# Patient Record
Sex: MALE | Race: WHITE | NOT HISPANIC OR LATINO | Employment: OTHER | URBAN - METROPOLITAN AREA
[De-identification: names, ages, dates, MRNs, and addresses within clinical notes are randomized per-mention and may not be internally consistent; named-entity substitution may affect disease eponyms.]

---

## 2019-04-03 ENCOUNTER — HOSPITAL ENCOUNTER (OUTPATIENT)
Dept: LAB | Facility: HOSPITAL | Age: 74
Discharge: HOME | End: 2019-04-03
Attending: PHYSICIAN ASSISTANT

## 2019-04-04 ENCOUNTER — HOSPITAL ENCOUNTER (OUTPATIENT)
Dept: LAB | Facility: HOSPITAL | Age: 74
Discharge: HOME | End: 2019-04-04
Attending: PHYSICIAN ASSISTANT

## 2019-04-04 LAB
25(OH)D3 SERPL-MCNC: 44.1 NG/ML (ref 30–100)
ALBUMIN SERPL-MCNC: 4.2 G/DL (ref 3.5–5)
ALBUMIN/GLOB SERPL: 1.4 {RATIO} (ref 1.4–2.6)
ALP SERPL-CCNC: 55 U/L (ref 56–155)
ALT SERPL-CCNC: 16 U/L (ref 10–40)
ANION GAP SERPL CALC-SCNC: 14 MMOL/L (ref 8–19)
AST SERPL-CCNC: 18 U/L (ref 15–50)
BASOPHILS # BLD AUTO: 0.03 10*3/UL (ref 0–0.2)
BASOPHILS NFR BLD AUTO: 0.6 % (ref 0–3)
BILIRUB SERPL-MCNC: 0.52 MG/DL (ref 0.2–1.3)
BUN SERPL-MCNC: 15 MG/DL (ref 5–25)
BUN/CREAT SERPL: 11 {RATIO} (ref 6–20)
CALCIUM SERPL-MCNC: 8.8 MG/DL (ref 8.7–10.4)
CHLORIDE SERPL-SCNC: 104 MMOL/L (ref 99–111)
CHOLEST SERPL-MCNC: 129 MG/DL (ref 107–200)
CHOLEST/HDLC SERPL: 2.2 {RATIO} (ref 3–6)
CONV ABS IMM GRAN: 0.02 10*3/UL (ref 0–0.2)
CONV CO2: 27 MMOL/L (ref 22–32)
CONV IMMATURE GRAN: 0.4 % (ref 0–1.8)
CONV TOTAL PROTEIN: 7.3 G/DL (ref 6.3–8.2)
CREAT UR-MCNC: 1.37 MG/DL (ref 0.7–1.2)
DEPRECATED RDW RBC AUTO: 43.3 FL (ref 35.1–43.9)
EOSINOPHIL # BLD AUTO: 0.36 10*3/UL (ref 0–0.7)
EOSINOPHIL # BLD AUTO: 7.5 % (ref 0–7)
ERYTHROCYTE [DISTWIDTH] IN BLOOD BY AUTOMATED COUNT: 14.1 % (ref 11.6–14.4)
FOLATE SERPL-MCNC: 11.6 NG/ML (ref 4.8–20)
GFR SERPLBLD BASED ON 1.73 SQ M-ARVRAT: 51 ML/MIN/{1.73_M2}
GLOBULIN UR ELPH-MCNC: 3.1 G/DL (ref 2–3.5)
GLUCOSE SERPL-MCNC: 100 MG/DL (ref 70–99)
HBA1C MFR BLD: 12.8 G/DL (ref 14–18)
HCT VFR BLD AUTO: 40.5 % (ref 42–52)
HDLC SERPL-MCNC: 59 MG/DL (ref 40–60)
IRON SATN MFR SERPL: 19 % (ref 20–55)
IRON SERPL-MCNC: 76 UG/DL (ref 70–180)
LDLC SERPL CALC-MCNC: 55 MG/DL (ref 70–100)
LYMPHOCYTES # BLD AUTO: 1.03 10*3/UL (ref 1–5)
MCH RBC QN AUTO: 26.7 PG (ref 27–31)
MCHC RBC AUTO-ENTMCNC: 31.6 G/DL (ref 33–37)
MCV RBC AUTO: 84.4 FL (ref 80–96)
MONOCYTES # BLD AUTO: 0.47 10*3/UL (ref 0.2–1.2)
MONOCYTES NFR BLD AUTO: 9.8 % (ref 3–10)
NEUTROPHILS # BLD AUTO: 2.87 10*3/UL (ref 2–8)
NEUTROPHILS NFR BLD AUTO: 60.2 % (ref 30–85)
NRBC CBCN: 0 % (ref 0–0.7)
OSMOLALITY SERPL CALC.SUM OF ELEC: 293 MOSM/KG (ref 273–304)
PLATELET # BLD AUTO: 253 10*3/UL (ref 130–400)
PMV BLD AUTO: 11 FL (ref 9.4–12.4)
POTASSIUM SERPL-SCNC: 4.2 MMOL/L (ref 3.5–5.3)
RBC # BLD AUTO: 4.8 10*6/UL (ref 4.7–6.1)
SODIUM SERPL-SCNC: 141 MMOL/L (ref 135–147)
T4 FREE SERPL-MCNC: 1.1 NG/DL (ref 0.9–1.8)
TIBC SERPL-MCNC: 398 UG/DL (ref 245–450)
TRANSFERRIN SERPL-MCNC: 278 MG/DL (ref 215–365)
TRIGL SERPL-MCNC: 73 MG/DL (ref 40–150)
TSH SERPL-ACNC: 4.71 M[IU]/L (ref 0.27–4.2)
VARIANT LYMPHS NFR BLD MANUAL: 21.5 % (ref 20–45)
VIT B12 SERPL-MCNC: 255 PG/ML (ref 211–911)
VLDLC SERPL-MCNC: 15 MG/DL (ref 5–37)
WBC # BLD AUTO: 4.78 10*3/UL (ref 4.8–10.8)

## 2019-04-22 ENCOUNTER — OFFICE VISIT CONVERTED (OUTPATIENT)
Dept: SURGERY | Facility: CLINIC | Age: 74
End: 2019-04-22
Attending: NURSE PRACTITIONER

## 2019-05-06 ENCOUNTER — HOSPITAL ENCOUNTER (OUTPATIENT)
Dept: GASTROENTEROLOGY | Facility: HOSPITAL | Age: 74
Setting detail: HOSPITAL OUTPATIENT SURGERY
Discharge: HOME | End: 2019-05-06
Attending: SURGERY

## 2019-10-04 ENCOUNTER — HOSPITAL ENCOUNTER (OUTPATIENT)
Dept: LAB | Facility: HOSPITAL | Age: 74
Discharge: HOME | End: 2019-10-04
Attending: PHYSICIAN ASSISTANT

## 2019-10-04 LAB
25(OH)D3 SERPL-MCNC: 42.2 NG/ML (ref 30–100)
ALBUMIN SERPL-MCNC: 4.4 G/DL (ref 3.5–5)
ALBUMIN/GLOB SERPL: 1.4 {RATIO} (ref 1.4–2.6)
ALP SERPL-CCNC: 57 U/L (ref 56–155)
ALT SERPL-CCNC: 13 U/L (ref 10–40)
ANION GAP SERPL CALC-SCNC: 18 MMOL/L (ref 8–19)
AST SERPL-CCNC: 16 U/L (ref 15–50)
BASOPHILS # BLD AUTO: 0.02 10*3/UL (ref 0–0.2)
BASOPHILS NFR BLD AUTO: 0.4 % (ref 0–3)
BILIRUB SERPL-MCNC: 0.41 MG/DL (ref 0.2–1.3)
BUN SERPL-MCNC: 17 MG/DL (ref 5–25)
BUN/CREAT SERPL: 12 {RATIO} (ref 6–20)
CALCIUM SERPL-MCNC: 9.4 MG/DL (ref 8.7–10.4)
CHLORIDE SERPL-SCNC: 104 MMOL/L (ref 99–111)
CHOLEST SERPL-MCNC: 142 MG/DL (ref 107–200)
CHOLEST/HDLC SERPL: 2.3 {RATIO} (ref 3–6)
CONV ABS IMM GRAN: 0.02 10*3/UL (ref 0–0.2)
CONV CO2: 24 MMOL/L (ref 22–32)
CONV IMMATURE GRAN: 0.4 % (ref 0–1.8)
CONV TOTAL PROTEIN: 7.6 G/DL (ref 6.3–8.2)
CREAT UR-MCNC: 1.39 MG/DL (ref 0.7–1.2)
DEPRECATED RDW RBC AUTO: 42.3 FL (ref 35.1–43.9)
EOSINOPHIL # BLD AUTO: 0.31 10*3/UL (ref 0–0.7)
EOSINOPHIL # BLD AUTO: 6 % (ref 0–7)
ERYTHROCYTE [DISTWIDTH] IN BLOOD BY AUTOMATED COUNT: 14.1 % (ref 11.6–14.4)
FOLATE SERPL-MCNC: 18.2 NG/ML (ref 4.8–20)
GFR SERPLBLD BASED ON 1.73 SQ M-ARVRAT: 50 ML/MIN/{1.73_M2}
GLOBULIN UR ELPH-MCNC: 3.2 G/DL (ref 2–3.5)
GLUCOSE SERPL-MCNC: 100 MG/DL (ref 70–99)
HCT VFR BLD AUTO: 38.4 % (ref 42–52)
HDLC SERPL-MCNC: 63 MG/DL (ref 40–60)
HGB BLD-MCNC: 12.1 G/DL (ref 14–18)
IRON SATN MFR SERPL: 7 % (ref 20–55)
IRON SERPL-MCNC: 31 UG/DL (ref 70–180)
LDLC SERPL CALC-MCNC: 65 MG/DL (ref 70–100)
LYMPHOCYTES # BLD AUTO: 1.11 10*3/UL (ref 1–5)
LYMPHOCYTES NFR BLD AUTO: 21.3 % (ref 20–45)
MCH RBC QN AUTO: 26.2 PG (ref 27–31)
MCHC RBC AUTO-ENTMCNC: 31.5 G/DL (ref 33–37)
MCV RBC AUTO: 83.1 FL (ref 80–96)
MONOCYTES # BLD AUTO: 0.6 10*3/UL (ref 0.2–1.2)
MONOCYTES NFR BLD AUTO: 11.5 % (ref 3–10)
NEUTROPHILS # BLD AUTO: 3.14 10*3/UL (ref 2–8)
NEUTROPHILS NFR BLD AUTO: 60.4 % (ref 30–85)
NRBC CBCN: 0 % (ref 0–0.7)
OSMOLALITY SERPL CALC.SUM OF ELEC: 294 MOSM/KG (ref 273–304)
PLATELET # BLD AUTO: 249 10*3/UL (ref 130–400)
PMV BLD AUTO: 11.3 FL (ref 9.4–12.4)
POTASSIUM SERPL-SCNC: 4.6 MMOL/L (ref 3.5–5.3)
RBC # BLD AUTO: 4.62 10*6/UL (ref 4.7–6.1)
SODIUM SERPL-SCNC: 141 MMOL/L (ref 135–147)
T4 FREE SERPL-MCNC: 1.1 NG/DL (ref 0.9–1.8)
TIBC SERPL-MCNC: 429 UG/DL (ref 245–450)
TRANSFERRIN SERPL-MCNC: 300 MG/DL (ref 215–365)
TRIGL SERPL-MCNC: 69 MG/DL (ref 40–150)
TSH SERPL-ACNC: 3.16 M[IU]/L (ref 0.27–4.2)
VIT B12 SERPL-MCNC: 276 PG/ML (ref 211–911)
VLDLC SERPL-MCNC: 14 MG/DL (ref 5–37)
WBC # BLD AUTO: 5.2 10*3/UL (ref 4.8–10.8)

## 2019-12-27 ENCOUNTER — OFFICE VISIT CONVERTED (OUTPATIENT)
Dept: CARDIOLOGY | Facility: CLINIC | Age: 74
End: 2019-12-27
Attending: INTERNAL MEDICINE

## 2019-12-27 ENCOUNTER — CONVERSION ENCOUNTER (OUTPATIENT)
Dept: CARDIOLOGY | Facility: CLINIC | Age: 74
End: 2019-12-27

## 2020-03-31 ENCOUNTER — HOSPITAL ENCOUNTER (OUTPATIENT)
Dept: LAB | Facility: HOSPITAL | Age: 75
Discharge: HOME | End: 2020-03-31
Attending: PHYSICIAN ASSISTANT

## 2020-03-31 LAB
25(OH)D3 SERPL-MCNC: 36.8 NG/ML (ref 30–100)
ALBUMIN SERPL-MCNC: 4.1 G/DL (ref 3.5–5)
ALBUMIN/GLOB SERPL: 1.3 {RATIO} (ref 1.4–2.6)
ALP SERPL-CCNC: 55 U/L (ref 56–155)
ALT SERPL-CCNC: 11 U/L (ref 10–40)
ANION GAP SERPL CALC-SCNC: 18 MMOL/L (ref 8–19)
AST SERPL-CCNC: 15 U/L (ref 15–50)
BASOPHILS # BLD AUTO: 0.03 10*3/UL (ref 0–0.2)
BASOPHILS NFR BLD AUTO: 0.5 % (ref 0–3)
BILIRUB SERPL-MCNC: 0.54 MG/DL (ref 0.2–1.3)
BUN SERPL-MCNC: 14 MG/DL (ref 5–25)
BUN/CREAT SERPL: 12 {RATIO} (ref 6–20)
CALCIUM SERPL-MCNC: 9 MG/DL (ref 8.7–10.4)
CHLORIDE SERPL-SCNC: 103 MMOL/L (ref 99–111)
CHOLEST SERPL-MCNC: 120 MG/DL (ref 107–200)
CHOLEST/HDLC SERPL: 2 {RATIO} (ref 3–6)
CONV ABS IMM GRAN: 0.01 10*3/UL (ref 0–0.2)
CONV CO2: 25 MMOL/L (ref 22–32)
CONV IMMATURE GRAN: 0.2 % (ref 0–1.8)
CONV TOTAL PROTEIN: 7.2 G/DL (ref 6.3–8.2)
CREAT UR-MCNC: 1.2 MG/DL (ref 0.7–1.2)
DEPRECATED RDW RBC AUTO: 43.9 FL (ref 35.1–43.9)
EOSINOPHIL # BLD AUTO: 0.34 10*3/UL (ref 0–0.7)
EOSINOPHIL # BLD AUTO: 5.7 % (ref 0–7)
ERYTHROCYTE [DISTWIDTH] IN BLOOD BY AUTOMATED COUNT: 14.8 % (ref 11.6–14.4)
GFR SERPLBLD BASED ON 1.73 SQ M-ARVRAT: 59 ML/MIN/{1.73_M2}
GLOBULIN UR ELPH-MCNC: 3.1 G/DL (ref 2–3.5)
GLUCOSE SERPL-MCNC: 98 MG/DL (ref 70–99)
HCT VFR BLD AUTO: 37.9 % (ref 42–52)
HDLC SERPL-MCNC: 59 MG/DL (ref 40–60)
HGB BLD-MCNC: 11.9 G/DL (ref 14–18)
IRON SATN MFR SERPL: 9 % (ref 20–55)
IRON SERPL-MCNC: 38 UG/DL (ref 70–180)
LDLC SERPL CALC-MCNC: 49 MG/DL (ref 70–100)
LYMPHOCYTES # BLD AUTO: 1.13 10*3/UL (ref 1–5)
LYMPHOCYTES NFR BLD AUTO: 18.9 % (ref 20–45)
MCH RBC QN AUTO: 25.4 PG (ref 27–31)
MCHC RBC AUTO-ENTMCNC: 31.4 G/DL (ref 33–37)
MCV RBC AUTO: 81 FL (ref 80–96)
MONOCYTES # BLD AUTO: 0.56 10*3/UL (ref 0.2–1.2)
MONOCYTES NFR BLD AUTO: 9.4 % (ref 3–10)
NEUTROPHILS # BLD AUTO: 3.9 10*3/UL (ref 2–8)
NEUTROPHILS NFR BLD AUTO: 65.3 % (ref 30–85)
NRBC CBCN: 0 % (ref 0–0.7)
OSMOLALITY SERPL CALC.SUM OF ELEC: 294 MOSM/KG (ref 273–304)
PLATELET # BLD AUTO: 238 10*3/UL (ref 130–400)
PMV BLD AUTO: 11.4 FL (ref 9.4–12.4)
POTASSIUM SERPL-SCNC: 3.9 MMOL/L (ref 3.5–5.3)
PSA SERPL-MCNC: 3.68 NG/ML (ref 0–4)
RBC # BLD AUTO: 4.68 10*6/UL (ref 4.7–6.1)
SODIUM SERPL-SCNC: 142 MMOL/L (ref 135–147)
T4 FREE SERPL-MCNC: 1.1 NG/DL (ref 0.9–1.8)
TIBC SERPL-MCNC: 443 UG/DL (ref 245–450)
TRANSFERRIN SERPL-MCNC: 310 MG/DL (ref 215–365)
TRIGL SERPL-MCNC: 62 MG/DL (ref 40–150)
TSH SERPL-ACNC: 3.85 M[IU]/L (ref 0.27–4.2)
VLDLC SERPL-MCNC: 12 MG/DL (ref 5–37)
WBC # BLD AUTO: 5.97 10*3/UL (ref 4.8–10.8)

## 2020-07-09 ENCOUNTER — OFFICE VISIT CONVERTED (OUTPATIENT)
Dept: CARDIOLOGY | Facility: CLINIC | Age: 75
End: 2020-07-09
Attending: INTERNAL MEDICINE

## 2020-07-09 ENCOUNTER — CONVERSION ENCOUNTER (OUTPATIENT)
Dept: CARDIOLOGY | Facility: CLINIC | Age: 75
End: 2020-07-09

## 2020-08-28 ENCOUNTER — HOSPITAL ENCOUNTER (OUTPATIENT)
Dept: LAB | Facility: HOSPITAL | Age: 75
Discharge: HOME | End: 2020-08-28
Attending: PHYSICIAN ASSISTANT

## 2020-08-28 LAB
25(OH)D3 SERPL-MCNC: 39.8 NG/ML (ref 30–100)
ALBUMIN SERPL-MCNC: 4.2 G/DL (ref 3.5–5)
ALBUMIN/GLOB SERPL: 1.6 {RATIO} (ref 1.4–2.6)
ALP SERPL-CCNC: 53 U/L (ref 56–155)
ALT SERPL-CCNC: 15 U/L (ref 10–40)
ANION GAP SERPL CALC-SCNC: 14 MMOL/L (ref 8–19)
AST SERPL-CCNC: 17 U/L (ref 15–50)
BASOPHILS # BLD AUTO: 0.03 10*3/UL (ref 0–0.2)
BASOPHILS NFR BLD AUTO: 0.5 % (ref 0–3)
BILIRUB SERPL-MCNC: 0.4 MG/DL (ref 0.2–1.3)
BUN SERPL-MCNC: 15 MG/DL (ref 5–25)
BUN/CREAT SERPL: 10 {RATIO} (ref 6–20)
CALCIUM SERPL-MCNC: 9.9 MG/DL (ref 8.7–10.4)
CHLORIDE SERPL-SCNC: 104 MMOL/L (ref 99–111)
CHOLEST SERPL-MCNC: 128 MG/DL (ref 107–200)
CHOLEST/HDLC SERPL: 2.2 {RATIO} (ref 3–6)
CONV ABS IMM GRAN: 0.01 10*3/UL (ref 0–0.2)
CONV CO2: 25 MMOL/L (ref 22–32)
CONV IMMATURE GRAN: 0.2 % (ref 0–1.8)
CONV TOTAL PROTEIN: 6.8 G/DL (ref 6.3–8.2)
CREAT UR-MCNC: 1.55 MG/DL (ref 0.7–1.2)
DEPRECATED RDW RBC AUTO: 42 FL (ref 35.1–43.9)
EOSINOPHIL # BLD AUTO: 0.38 10*3/UL (ref 0–0.7)
EOSINOPHIL # BLD AUTO: 6.7 % (ref 0–7)
ERYTHROCYTE [DISTWIDTH] IN BLOOD BY AUTOMATED COUNT: 13.6 % (ref 11.6–14.4)
GFR SERPLBLD BASED ON 1.73 SQ M-ARVRAT: 43 ML/MIN/{1.73_M2}
GLOBULIN UR ELPH-MCNC: 2.6 G/DL (ref 2–3.5)
GLUCOSE SERPL-MCNC: 102 MG/DL (ref 70–99)
HCT VFR BLD AUTO: 36.8 % (ref 42–52)
HDLC SERPL-MCNC: 59 MG/DL (ref 40–60)
HGB BLD-MCNC: 11.8 G/DL (ref 14–18)
IRON SATN MFR SERPL: 8 % (ref 20–55)
IRON SERPL-MCNC: 35 UG/DL (ref 70–180)
LDLC SERPL CALC-MCNC: 56 MG/DL (ref 70–100)
LYMPHOCYTES # BLD AUTO: 1.3 10*3/UL (ref 1–5)
LYMPHOCYTES NFR BLD AUTO: 22.8 % (ref 20–45)
MCH RBC QN AUTO: 26.9 PG (ref 27–31)
MCHC RBC AUTO-ENTMCNC: 32.1 G/DL (ref 33–37)
MCV RBC AUTO: 84 FL (ref 80–96)
MONOCYTES # BLD AUTO: 0.63 10*3/UL (ref 0.2–1.2)
MONOCYTES NFR BLD AUTO: 11 % (ref 3–10)
NEUTROPHILS # BLD AUTO: 3.36 10*3/UL (ref 2–8)
NEUTROPHILS NFR BLD AUTO: 58.8 % (ref 30–85)
NRBC CBCN: 0 % (ref 0–0.7)
OSMOLALITY SERPL CALC.SUM OF ELEC: 287 MOSM/KG (ref 273–304)
PLATELET # BLD AUTO: 224 10*3/UL (ref 130–400)
PMV BLD AUTO: 11 FL (ref 9.4–12.4)
POTASSIUM SERPL-SCNC: 4.5 MMOL/L (ref 3.5–5.3)
RBC # BLD AUTO: 4.38 10*6/UL (ref 4.7–6.1)
SODIUM SERPL-SCNC: 138 MMOL/L (ref 135–147)
T4 FREE SERPL-MCNC: 1.1 NG/DL (ref 0.9–1.8)
TIBC SERPL-MCNC: 422 UG/DL (ref 245–450)
TRANSFERRIN SERPL-MCNC: 295 MG/DL (ref 215–365)
TRIGL SERPL-MCNC: 64 MG/DL (ref 40–150)
TSH SERPL-ACNC: 4.65 M[IU]/L (ref 0.27–4.2)
VLDLC SERPL-MCNC: 13 MG/DL (ref 5–37)
WBC # BLD AUTO: 5.71 10*3/UL (ref 4.8–10.8)

## 2020-09-28 ENCOUNTER — CONVERSION ENCOUNTER (OUTPATIENT)
Dept: SURGERY | Facility: CLINIC | Age: 75
End: 2020-09-28

## 2020-09-28 ENCOUNTER — OFFICE VISIT CONVERTED (OUTPATIENT)
Dept: UROLOGY | Facility: CLINIC | Age: 75
End: 2020-09-28
Attending: NURSE PRACTITIONER

## 2020-10-16 ENCOUNTER — CONVERSION ENCOUNTER (OUTPATIENT)
Dept: SURGERY | Facility: CLINIC | Age: 75
End: 2020-10-16

## 2020-10-19 ENCOUNTER — TELEPHONE CONVERTED (OUTPATIENT)
Dept: UROLOGY | Facility: CLINIC | Age: 75
End: 2020-10-19
Attending: NURSE PRACTITIONER

## 2020-11-10 ENCOUNTER — OFFICE VISIT CONVERTED (OUTPATIENT)
Dept: UROLOGY | Facility: CLINIC | Age: 75
End: 2020-11-10
Attending: NURSE PRACTITIONER

## 2020-11-10 ENCOUNTER — CONVERSION ENCOUNTER (OUTPATIENT)
Dept: SURGERY | Facility: CLINIC | Age: 75
End: 2020-11-10

## 2020-11-12 ENCOUNTER — HOSPITAL ENCOUNTER (OUTPATIENT)
Dept: LAB | Facility: HOSPITAL | Age: 75
Discharge: HOME | End: 2020-11-12
Attending: PHYSICIAN ASSISTANT

## 2020-11-12 LAB
ALBUMIN SERPL-MCNC: 4.2 G/DL (ref 3.5–5)
ALBUMIN/GLOB SERPL: 1.5 {RATIO} (ref 1.4–2.6)
ALP SERPL-CCNC: 54 U/L (ref 56–155)
ALT SERPL-CCNC: 17 U/L (ref 10–40)
ANION GAP SERPL CALC-SCNC: 14 MMOL/L (ref 8–19)
AST SERPL-CCNC: 18 U/L (ref 15–50)
BILIRUB SERPL-MCNC: 0.43 MG/DL (ref 0.2–1.3)
BUN SERPL-MCNC: 15 MG/DL (ref 5–25)
BUN/CREAT SERPL: 10 {RATIO} (ref 6–20)
CALCIUM SERPL-MCNC: 9.1 MG/DL (ref 8.7–10.4)
CHLORIDE SERPL-SCNC: 105 MMOL/L (ref 99–111)
CONV CO2: 26 MMOL/L (ref 22–32)
CONV TOTAL PROTEIN: 7 G/DL (ref 6.3–8.2)
CREAT UR-MCNC: 1.45 MG/DL (ref 0.7–1.2)
GFR SERPLBLD BASED ON 1.73 SQ M-ARVRAT: 47 ML/MIN/{1.73_M2}
GLOBULIN UR ELPH-MCNC: 2.8 G/DL (ref 2–3.5)
GLUCOSE SERPL-MCNC: 102 MG/DL (ref 70–99)
OSMOLALITY SERPL CALC.SUM OF ELEC: 293 MOSM/KG (ref 273–304)
POTASSIUM SERPL-SCNC: 3.9 MMOL/L (ref 3.5–5.3)
SODIUM SERPL-SCNC: 141 MMOL/L (ref 135–147)
T4 FREE SERPL-MCNC: 1.2 NG/DL (ref 0.9–1.8)
TSH SERPL-ACNC: 2.25 M[IU]/L (ref 0.27–4.2)

## 2020-11-24 ENCOUNTER — TELEPHONE CONVERTED (OUTPATIENT)
Dept: UROLOGY | Facility: CLINIC | Age: 75
End: 2020-11-24
Attending: NURSE PRACTITIONER

## 2021-01-12 ENCOUNTER — HOSPITAL ENCOUNTER (OUTPATIENT)
Dept: OTHER | Facility: HOSPITAL | Age: 76
Discharge: HOME | End: 2021-01-12
Attending: INTERNAL MEDICINE

## 2021-02-03 ENCOUNTER — OFFICE VISIT CONVERTED (OUTPATIENT)
Dept: CARDIOLOGY | Facility: CLINIC | Age: 76
End: 2021-02-03
Attending: INTERNAL MEDICINE

## 2021-02-09 ENCOUNTER — HOSPITAL ENCOUNTER (OUTPATIENT)
Dept: VACCINE CLINIC | Facility: HOSPITAL | Age: 76
Discharge: HOME | End: 2021-02-09
Attending: INTERNAL MEDICINE

## 2021-03-11 ENCOUNTER — HOSPITAL ENCOUNTER (OUTPATIENT)
Dept: LAB | Facility: HOSPITAL | Age: 76
Discharge: HOME | End: 2021-03-11
Attending: PHYSICIAN ASSISTANT

## 2021-03-11 LAB
25(OH)D3 SERPL-MCNC: 39.2 NG/ML (ref 30–100)
ALBUMIN SERPL-MCNC: 4.1 G/DL (ref 3.5–5)
ALBUMIN/GLOB SERPL: 1.4 {RATIO} (ref 1.4–2.6)
ALP SERPL-CCNC: 55 U/L (ref 56–155)
ALT SERPL-CCNC: 17 U/L (ref 10–40)
ANION GAP SERPL CALC-SCNC: 13 MMOL/L (ref 8–19)
AST SERPL-CCNC: 21 U/L (ref 15–50)
BASOPHILS # BLD AUTO: 0.03 10*3/UL (ref 0–0.2)
BASOPHILS NFR BLD AUTO: 0.5 % (ref 0–3)
BILIRUB SERPL-MCNC: 0.63 MG/DL (ref 0.2–1.3)
BUN SERPL-MCNC: 16 MG/DL (ref 5–25)
BUN/CREAT SERPL: 12 {RATIO} (ref 6–20)
CALCIUM SERPL-MCNC: 8.9 MG/DL (ref 8.7–10.4)
CHLORIDE SERPL-SCNC: 101 MMOL/L (ref 99–111)
CHOLEST SERPL-MCNC: 153 MG/DL (ref 107–200)
CHOLEST/HDLC SERPL: 2 {RATIO} (ref 3–6)
CONV ABS IMM GRAN: 0.01 10*3/UL (ref 0–0.2)
CONV CO2: 28 MMOL/L (ref 22–32)
CONV IMMATURE GRAN: 0.2 % (ref 0–1.8)
CONV TOTAL PROTEIN: 7.1 G/DL (ref 6.3–8.2)
CREAT UR-MCNC: 1.37 MG/DL (ref 0.7–1.2)
DEPRECATED RDW RBC AUTO: 41 FL (ref 35.1–43.9)
EOSINOPHIL # BLD AUTO: 0.35 10*3/UL (ref 0–0.7)
EOSINOPHIL # BLD AUTO: 5.3 % (ref 0–7)
ERYTHROCYTE [DISTWIDTH] IN BLOOD BY AUTOMATED COUNT: 13.3 % (ref 11.6–14.4)
GFR SERPLBLD BASED ON 1.73 SQ M-ARVRAT: 50 ML/MIN/{1.73_M2}
GLOBULIN UR ELPH-MCNC: 3 G/DL (ref 2–3.5)
GLUCOSE SERPL-MCNC: 98 MG/DL (ref 70–99)
HCT VFR BLD AUTO: 41.9 % (ref 42–52)
HDLC SERPL-MCNC: 75 MG/DL (ref 40–60)
HGB BLD-MCNC: 13.8 G/DL (ref 14–18)
IRON SATN MFR SERPL: 25 % (ref 20–55)
IRON SERPL-MCNC: 89 UG/DL (ref 70–180)
LDLC SERPL CALC-MCNC: 66 MG/DL (ref 70–100)
LYMPHOCYTES # BLD AUTO: 1.38 10*3/UL (ref 1–5)
LYMPHOCYTES NFR BLD AUTO: 21 % (ref 20–45)
MCH RBC QN AUTO: 28.2 PG (ref 27–31)
MCHC RBC AUTO-ENTMCNC: 32.9 G/DL (ref 33–37)
MCV RBC AUTO: 85.7 FL (ref 80–96)
MONOCYTES # BLD AUTO: 0.73 10*3/UL (ref 0.2–1.2)
MONOCYTES NFR BLD AUTO: 11.1 % (ref 3–10)
NEUTROPHILS # BLD AUTO: 4.07 10*3/UL (ref 2–8)
NEUTROPHILS NFR BLD AUTO: 61.9 % (ref 30–85)
NRBC CBCN: 0 % (ref 0–0.7)
OSMOLALITY SERPL CALC.SUM OF ELEC: 287 MOSM/KG (ref 273–304)
PLATELET # BLD AUTO: 237 10*3/UL (ref 130–400)
PMV BLD AUTO: 10.6 FL (ref 9.4–12.4)
POTASSIUM SERPL-SCNC: 3.8 MMOL/L (ref 3.5–5.3)
RBC # BLD AUTO: 4.89 10*6/UL (ref 4.7–6.1)
SODIUM SERPL-SCNC: 138 MMOL/L (ref 135–147)
T4 FREE SERPL-MCNC: 1.2 NG/DL (ref 0.9–1.8)
TIBC SERPL-MCNC: 356 UG/DL (ref 245–450)
TRANSFERRIN SERPL-MCNC: 249 MG/DL (ref 215–365)
TRIGL SERPL-MCNC: 58 MG/DL (ref 40–150)
TSH SERPL-ACNC: 3.47 M[IU]/L (ref 0.27–4.2)
VLDLC SERPL-MCNC: 12 MG/DL (ref 5–37)
WBC # BLD AUTO: 6.57 10*3/UL (ref 4.8–10.8)

## 2021-03-15 ENCOUNTER — HOSPITAL ENCOUNTER (OUTPATIENT)
Dept: GENERAL RADIOLOGY | Facility: HOSPITAL | Age: 76
Discharge: HOME | End: 2021-03-15
Attending: PHYSICIAN ASSISTANT

## 2021-05-10 NOTE — PROCEDURES
Procedure Note      Patient Name: Reji Mcmillan Sr.   Patient ID: [id number]   Sex: Male   YOB: 1945    Primary Care Provider: Bessie Everett PA-C   Referring Provider: Bessie Everett PA-C    Visit Date: November 10, 2020    Provider: CARSON Blanco   Location: [facility]   Location Address: [address] [place] [address]  [id number]   Location Phone: [phone]             The patient is here today for Trimix trial.  Penile injection was taught to the patient.  I instructed him on how to perform the injection using an insulin needle.  Patient was taught how to draw up the medicine into the syringe and how to do the self injection in the lateral aspect of the penis.  He was taught to avoid the dorsal vein as well as the urethra.  He was instructed to move the injection site from side to side with each subsequent injection.  The importance of cleaning the injection site and using clean technique to do the injection was iterated to the patient.     The patient was able to perform the injection without any issues today in the office.  He did have a slight response to the injection here in the office.  His dose today was 0.2 cc.  We discussed dose titration at home.  Trial dose.               Assessment  · Erectile dysfunction     607.84/N52.9      Plan  · Orders  o Penile Injection (TRIMEX) (08812) - 607.84/N52.9 - 11/10/2020  · Medications  o Medications have been Reconciled  o Transition of Care or Provider Policy  · Instructions  o Educated patient to continue to use no more than 2 times a week, dose may be increased by 0.2 mL each time until satisfactory response is obtained.Patient advised to present to the emergency department for any erection lasting longer than 4 hours as this may lead to damage to the penis and inability of the penis to sustain an erection ever again.Educated patient to keep Sudafed on hand in case of lasting erection.  o Follow-up with  patient in 2 weeks via telephone to see if he is able to sustain a response at home.            Electronically Signed by: CARSON Blanco -Author on November 10, 2020 04:01:43 PM

## 2021-05-10 NOTE — H&P
History and Physical      Patient Name: Reji McmillanSr.   Patient ID: [id number]   Sex: Male   YOB: 1945    Primary Care Provider: Bessie Everett PA-C   Referring Provider: Bessie Everett PA-C    Visit Date: September 28, 2020    Provider: CARSON Blanco   Location: [facility]   Location Address: [address] [place] [address]  [id number]   Location Phone: [phone]          Chief Complaint  · Inability to sustain an erection      History Of Present Illness  The patient is a 75 year old /White male, who presents on referral from Bessie Everett PA-C, for an urological evaluation for erectile dysfunction which has been present for several years. He states he was able to have normal erections prior.   His concern is difficulty getting an erection, difficulty maintaining an erection, and. The patient states his erections are approximately 70 % of normal erections. The patient has no additional complaints. The patient denies decrease in urinary stream, dysuria, hematuria, and painful erection. Risk factors include WHAT ARE HIS RISK FACTORS.   The patient has tried Viagra 100 mg, Levitra 20mg, and tadalafil 5mg q day. The medications have been partially effective.      He reports that he got some levitra from a friend and that was able to achieve an erection with it.       Past Medical History  Disease Name Date Onset Notes   Allergic rhinitis due to allergen --  --    Allergic rhinitis, chronic --  --    Chest pain --  --    Erectile Dysfunction --  --    GERD --  --    Heart Disease --  --    High blood pressure --  --    High cholesterol --  --    Hypercholesteremia --  --    Hypertension: controlled --  --    Hypothyroidism --  --    Polio --  --    Prostate Disorder --  --          Past Surgical History  Procedure Name Date Notes   Cardiac --  --    Cholecstectomy --  --    Colonoscopy --  --    Heart Bypass --  cabbage x2   Hernia --  --           Medication List  Name Date Started Instructions   Ambien 5 mg oral tablet  --    amlodipine 5 mg oral tablet  take 1 tablet (5 mg) by oral route once daily   Aspir-81 81 mg oral tablet,delayed release (DR/EC)  take 1 tablet (81 mg) by oral route once daily   atorvastatin 40 mg oral tablet  take 1 tablet (40 mg) by oral route once daily   Cialis 5 mg oral tablet  take 1 tablet (5 mg) by oral route once daily   iron 325 mg (65 mg iron) oral tablet  take 1 tablet (325 mg) by oral route once daily   lansoprazole 30 mg oral capsule,delayed release(DR/EC)  take 1 capsule (30 mg) by oral route once daily before a meal   levothyroxine 25 mcg oral tablet  take 1 tablet (25 mcg) by oral route once daily   Lipitor 40 mg oral tablet  take 1 tablet (40 mg) by oral route once daily   losartan 100 mg oral tablet  take 1 tablet (100 mg) by oral route once daily   Zyrtec 10 mg oral tablet  take 1 tablet (10 mg) by oral route once daily         Allergy List  Allergen Name Date Reaction Notes   NO KNOWN DRUG ALLERGIES --  --  --          Family Medical History  Disease Name Relative/Age Notes   Hypercholesterolemia Father/  Mother/   --    Heart Disease Mother/   --    Hypertension Father/  Mother/   --    Heart Attack (MI) Father/   --          Social History  Finding Status Start/Stop Quantity Notes   Alcohol Current some day --/-- --  drinks rarely; beer and liquor   Caffeine Current every day --/-- --  drinks regularly; coffee; 3-4 times per day   Second hand smoke exposure Never --/-- --  no   Tobacco Never --/-- --  never a smoker         Review of Systems  · Constitutional  o Denies  o : fatigue, night sweats  · Eyes  o Denies  o : double vision, blurred vision  · HENT  o Denies  o : vertigo, recent head injury  · Breasts  o Denies  o : abnormal changes in breast size, additional breast symptoms except as noted in the HPI  · Cardiovascular  o Denies  o : chest pain, irregular heart beats  · Respiratory  o Denies  o :  "shortness of breath, productive cough  · Gastrointestinal  o Denies  o : nausea, vomiting  · Genitourinary  o Denies  o : dysuria, urinary retention  · Integument  o Denies  o : hair growth change, new skin lesions  · Neurologic  o Denies  o : altered mental status, seizures  · Musculoskeletal  o Denies  o : joint swelling, limitation of motion  · Endocrine  o Denies  o : cold intolerance, heat intolerance  · Heme-Lymph  o Denies  o : petechiae, lymph node enlargement or tenderness  · Allergic-Immunologic  o Denies  o : frequent illnesses      Vitals  Date Time BP Position Site L\R Cuff Size HR RR TEMP (F) WT  HT  BMI kg/m2 BSA m2 O2 Sat HC       09/28/2020 10:16 AM       16  182lbs 4oz 5'  10.5\" 25.78 2.03           Physical Examination  · Constitutional  o Appearance  o : well nourished, well developed, alert, oriented, in no acute distress, well-tended appearance, appears about reported age, normal body habitus  · Head and Face  o Head  o :   § Inspection  § : atraumatic, normocephalic  o Face  o :   § Inspection  § : no facial lesions  · Eyes  o Sclerae  o : sclerae white  · Ears, Nose, Mouth and Throat  o Ears  o :   § External Ears  § : appearance within normal limits, no lesions present  o Nose  o :   § External Nose  § : appearance normal  · Neck  o Inspection/Palpation  o : normal appearance, trachea midline  · Respiratory  o Respiratory Effort  o : breathing unlabored  o Inspection of Chest  o : normal appearance, no retractions  · Genitourinary  o Digital Rectal Examination  o :   § Tone and Masses  § : normal sphincter tone, no rectal masses present  § Prostate  § : nontender to palpation, size normal, no nodules present, consistency normal  § Seminal Vesicles  § : normal size and symmetry without masses or tenderness  · Musculoskeletal  o Pelvis  o : no pelvic bony or muscular tenderness  o Ribs  o : no deformities or tenderness to palpation present, costochondral junctions nontender to " palpation  · Skin and Subcutaneous Tissue  o General Inspection  o : no rashes or lesions present, no lesions present, no areas of discoloration  · Neurologic  o Mental Status Examination  o :   § Orientation  § : grossly oriented to person, place and time  § Speech/Language  § : communication ability within normal limits  o Gait and Station  o : normal gait, able to stand without difficulty  · Psychiatric  o Judgement and Insight  o : judgment and insight intact, judgement for everyday activities and social situations within normal limits, insight intact  o Mood and Affect  o : mood normal, affect appropriate              Assessment  · Erectile dysfunction     607.84/N52.9      Plan  · Medications  o vardenafil 10 mg oral tablet   SIG: take 1 tablet (10 mg) by oral route once daily as needed approximately 1 hour before sexual activity for 30 days   DISP: (30) tablets with 4 refills  Prescribed on 09/28/2020     o Medications have been Reconciled  o Transition of Care or Provider Policy  · Instructions  o DISCUSSION:  o The patient's complaints are consistent with erectile dysfunction. I have discussed with him the various etiologies of the impotence. I have recommended a plan for evaluation and treatment.  o PLAN: f/u 4 weeks   o Start Levetra at 10 mg.            Electronically Signed by: CARSON Blanco -Author on September 28, 2020 12:11:06 PM

## 2021-05-13 NOTE — PROGRESS NOTES
Quick Note      Patient Name: Reji Mcmillan Sr.   Patient ID: [id number]   Sex: Male   YOB: 1945    Primary Care Provider: Bessie Everett PA-C   Referring Provider: Bessie Everett PA-C    Visit Date: October 19, 2020    Provider: CARSON Blanco   Location: [facility]   Location Address: [address] [place] [address]  [address] [place] [address]  [id number]   Location Phone: [phone]          History Of Present Illness  TELEHEALTH TELEPHONE VISIT  Reji Mcmillan Sr. is a 75 year old /White male who is presenting for evaluation via telehealth telephone visit. Verbal consent obtained before beginning visit.   Provider spent 9 minutes with the patient during the telehealth visit.   The following staff were present during this visit: Vero Aaron RN, CARSON Blanco   Past Medical History/ Overview of Patient Symptoms     Called patient to discuss options for erectile dysfunction.    The patient reports that he was unable to  the prescription for Levitra as it was too expensive and his insurance would not pay for it.    The patient states that he attempted to take the 100 mg Viagra that he had leftover and that would only provide about 50% of an erection and gave him a headache.    The patient wishes to trial Trimix injections.           Assessment  · Erectile dysfunction     607.84/N52.9      Plan  · Orders  o Physican Telephone evaluation, 5-10 min (93675) - 607.84/N52.9 - 10/19/2020  · Medications  o Medications have been Reconciled  o Transition of Care or Provider Policy  · Instructions  o Plan Of Care: We will order Trimix trial dose for an office consultation. We will follow up with patient to demonstrate proper use of this medication and see if he will have a response to this medication.   o Electronically Identified Patient Education Materials Provided Electronically            Electronically Signed by: CARSON Blanco  -Author on October 19, 2020 10:17:28 AM

## 2021-05-13 NOTE — PROGRESS NOTES
"   Progress Note      Patient Name: Reji Mcmillan Sr.   Patient ID: [id number]   Sex: Male   YOB: 1945    Primary Care Provider: Bessie Everett PA-C   Referring Provider: Bessie Everett PA-C    Visit Date: July 9, 2020    Provider: Jon Mata MD   Location: [facility]   Location Address: [address] [place] [address], [place], [place]  [id number]   Location Phone: [phone]          Chief Complaint  · Coronary artery disease and CABG      History Of Present Illness  REFERRING CARE PROVIDER: Bessie Everett PA-C   Reji Mcmillan Sr. is a 74 year old /White male with a history of known coronary artery disease, previous CABG, hypertension and dyslipidemia, who has had no chest pain, shortness of breath or other complaints.   PAST MEDICAL HISTORY: Coronary artery disease with CABG; Dyslipidemia; GERD; Hypertension.   FAMILY HISTORY: Positive for hypertension. Negative for diabetes and heart disease.   PSYCHOSOCIAL HISTORY: No history of mood changes or depression. He drinks a moderate amount of alcohol. He never used tobacco.   CURRENT MEDICATIONS: include Lansoprazole 30 mg daily; Cialis 5 mg p.r.n.; Losartan 100 mg daily; Amlodipine 5 mg daily; Atorvastatin 40 mg daily; Zyrtec; ASA 81 mg daily; Ambien 5 mg daily. The dosage and frequency of the medications were reviewed with the patient.       Review of Systems  · Cardiovascular  o Admits  o : palpitations (fast, fluttering, or skipping beats), chest pain or angina pectoris   o Denies  o : swelling (feet, ankles, hands), shortness of breath while walking or lying flat  · Respiratory  o Denies  o : chronic or frequent cough, asthma or wheezing      Vitals  Date Time BP Position Site L\R Cuff Size HR RR TEMP (F) WT  HT  BMI kg/m2 BSA m2 O2 Sat HC       07/09/2020 09:10 /54 Sitting    56 - R   180lbs 0oz 5'  10\" 25.83 2.01     07/09/2020 09:10 /56 Sitting    56 - R                 Physical " Examination  · Constitutional  o Appearance  o : Awake, alert, in no acute distress.   · Eyes  o Conjunctivae  o : Normal.  · Ears, Nose, Mouth and Throat  o Oral Cavity  o :   § Oral Mucosa  § : Normal.  · Neck  o Inspection/Palpation  o : No JVD. Good carotid upstroke. No thyromegaly.  · Respiratory  o Respiratory  o : Good respiratory effort. Clear to percussion and auscultation.  · Cardiovascular  o Heart  o :   § Auscultation of Heart  § : S1, S2 normal. Regular rate and rhythm without murmurs, gallops, or rubs.  o Peripheral Vascular System  o :   § Extremities  § : Good femoral and pedal pulses. No pedal edema.  · Gastrointestinal  o Abdominal Examination  o : Soft. No tenderness or masses felt. No hepatosplenomegaly. Abdominal aorta is not palpable.     Hemoglobin was 11.9.  LDL cholesterol was 49.           Assessment     ASSESSMENT AND PLAN:    1.  Coronary artery disease with previous CABG:  No anginal discomfort.  On chronic ASA 81 mg once a day.  2.  Hypertension controlled.  3.  Dyslipidemia:  On statin and at goal.  4.  ED:  Did give patient a prescription of Viagra 100 mg p.r.n.  He does take Cialis and he says occasionally       only for BPH when he is on trips.  Recommended not using both Viagra and Cialis together.    MD Mat Dexter/carrol         This note was transcribed by Suzanne Roche.  carrol/mat   The above service was transcribed by Suzanne Roche, and I attest to the accuracy of the note.  MAT.               Electronically Signed by: Suzanne Roche-, -Author on July 14, 2020 05:43:27 AM  Electronically Co-signed by: Jon Mata MD -Reviewer on July 14, 2020 02:06:32 PM

## 2021-05-13 NOTE — PROGRESS NOTES
"   Quick Note      Patient Name: Reji Mcmillan Sr.   Patient ID: [id number]   Sex: Male   YOB: 1945    Primary Care Provider: Bessie Everett PA-C   Referring Provider: Bessie Everett PA-C    Visit Date: November 24, 2020    Provider: CARSON Blanco   Location: [facility]   Location Address: [address] [place] [address]  [id number]   Location Phone: [phone]          History Of Present Illness  TELEHEALTH TELEPHONE VISIT  Reji Mcmillan Sr. is a 75 year old /White male who is presenting for evaluation via telehealth telephone visit. Verbal consent obtained before beginning visit.   Provider spent 5 minutes with the patient during the telehealth visit.   The following staff were present during this visit: Alva Rivera MA, Parul BYRD   Past Medical History/ Overview of Patient Symptoms     Called patient to discuss trimix.  Patient was started on Trimix injections at last visit as he has erectile dysfunction that is refractory to treatment.    He is using 0.1 mLof trimix with good response to mediation patient stated 0.2 mL dose was too effective.       Vitals  Date Time BP Position Site L\R Cuff Size HR RR TEMP (F) WT  HT  BMI kg/m2 BSA m2 O2 Sat FR L/min FiO2 HC       11/24/2020 08:40 AM         178lbs 0oz 5'  11\" 24.83 2.01                 Assessment  · Erectile dysfunction     607.84/N52.9      Plan  · Orders  o Physican Telephone evaluation, 5-10 min (74859) - 607.84/N52.9 - 11/24/2020  · Medications  o Medications have been Reconciled  o Transition of Care or Provider Policy  · Instructions  o Plan Of Care: Discussed with patient to continue Trimix injections and alternate sites each time. Educated patient once again that should he have an erection lasting longer than 4 hours he will need to go to the emergency department or risk never having an erection again. We will follow-up with patient in office in 6 " months            Electronically Signed by: CARSON Blanco -Author on November 24, 2020 09:22:04 AM

## 2021-05-14 VITALS — HEIGHT: 70 IN | RESPIRATION RATE: 16 BRPM | BODY MASS INDEX: 25.5 KG/M2 | WEIGHT: 178.12 LBS

## 2021-05-14 VITALS
SYSTOLIC BLOOD PRESSURE: 154 MMHG | DIASTOLIC BLOOD PRESSURE: 78 MMHG | HEIGHT: 70 IN | HEART RATE: 60 BPM | WEIGHT: 179 LBS | BODY MASS INDEX: 25.62 KG/M2

## 2021-05-14 VITALS — HEIGHT: 71 IN | WEIGHT: 178.12 LBS | RESPIRATION RATE: 12 BRPM | BODY MASS INDEX: 24.94 KG/M2

## 2021-05-14 VITALS — HEIGHT: 70 IN | RESPIRATION RATE: 16 BRPM | WEIGHT: 182.25 LBS | BODY MASS INDEX: 26.09 KG/M2

## 2021-05-14 VITALS — HEIGHT: 71 IN | WEIGHT: 178 LBS | BODY MASS INDEX: 24.92 KG/M2

## 2021-05-14 NOTE — PROGRESS NOTES
"   Progress Note      Patient Name: Reij Mcmillan Sr.   Patient ID: [id number]   Sex: Male   YOB: 1945    Primary Care Provider: Bessie Everett PA-C   Referring Provider: Bessie Everett PA-C    Visit Date: February 3, 2021    Provider: Jon Mata MD   Location: [facility]   Location Address: [address] [place] [address], [address]   [place], [place]  [id number]   Location Phone: [phone]          History Of Present Illness  REFERRING CARE PROVIDER: Bessie Everett PA-C   Reji Mcmillan Sr. is a 75 year old /White male with coronary artery disease with prior CABG, hypertension, and dyslipidemia who has been doing well. She has occasional palpitations but no complaints.   PAST MEDICAL HISTORY: Coronary artery disease with CABG; Dyslipidemia; GERD; Hypertension.   PSYCHOSOCIAL HISTORY: Moderate use of alcohol. Previous use of tobacco, but quit.   CURRENT MEDICATIONS: Medications have been reviewed and are as stated.      ALLERGIES: No known drug allergies.       Review of Systems  · Cardiovascular  o Admits  o : palpitations (fast, fluttering, or skipping beats)  o Denies  o : swelling (feet, ankles, hands), shortness of breath while walking or lying flat, chest pain or angina pectoris   · Respiratory  o Denies  o : chronic or frequent cough      Vitals  Date Time BP Position Site L\R Cuff Size HR RR TEMP (F) WT  HT  BMI kg/m2 BSA m2 O2 Sat FR L/min FiO2 HC       02/03/2021 01:24 /78 Sitting    60 - R   179lbs 0oz 5'  10\" 25.68 2       02/03/2021 01:24 /76 Sitting    60 - R                   Physical Examination  · Constitutional  o Appearance  o : Awake, alert, in no acute distress.   · Eyes  o Conjunctivae  o : Normal.  · Ears, Nose, Mouth and Throat  o Oral Cavity  o :   § Oral Mucosa  § : Normal.  · Neck  o Inspection/Palpation  o : No JVD. Good carotid upstroke. No thyromegaly.  · Respiratory  o Respiratory  o : Good respiratory effort. Clear to percussion and " auscultation.  · Cardiovascular  o Heart  o :   § Auscultation of Heart  § : S1, S2 normal. Regular rate and rhythm without murmurs, gallops, or rubs.  o Peripheral Vascular System  o :   § Extremities  § : Good femoral and pedal pulses. No pedal edema.  · Gastrointestinal  o Abdominal Examination  o : Soft. No tenderness or masses felt. No hepatosplenomegaly. Abdominal aorta is not palpable.  · Labs  o Labs  o : Creatinine 1.45, potassium 3.9, free T4 1.2, TSH 2.25.          Assessment     ASSESSMENT & PLAN:    1.  Coronary artery disease with previous CABG.  No ongoing anginal discomfort, on chronic aspirin once a day.   2.  Hypertension, mildly elevated.  Will add hydrochlorothiazide 12.5 mg to losartan and repeat a renal panel in        2 weeks.   3.  Hyperlipidemia.  The patient is on statin and tolerating well. LDL was previously below goal of 70.      Jon Mata MD  JH/rt             Electronically Signed by: Francia White-, Other -Author on February 18, 2021 03:22:40 PM  Electronically Co-signed by: Jon Mata MD -Reviewer on February 18, 2021 04:55:04 PM

## 2021-05-15 VITALS
SYSTOLIC BLOOD PRESSURE: 136 MMHG | DIASTOLIC BLOOD PRESSURE: 60 MMHG | HEART RATE: 64 BPM | HEIGHT: 70 IN | BODY MASS INDEX: 25.48 KG/M2 | WEIGHT: 178 LBS

## 2021-05-15 VITALS — WEIGHT: 189 LBS | HEART RATE: 62 BPM | HEIGHT: 71 IN | BODY MASS INDEX: 26.46 KG/M2

## 2021-05-15 VITALS
WEIGHT: 180 LBS | DIASTOLIC BLOOD PRESSURE: 54 MMHG | HEART RATE: 56 BPM | BODY MASS INDEX: 25.77 KG/M2 | HEIGHT: 70 IN | SYSTOLIC BLOOD PRESSURE: 150 MMHG

## 2021-05-21 ENCOUNTER — HOSPITAL ENCOUNTER (OUTPATIENT)
Dept: LAB | Facility: HOSPITAL | Age: 76
Discharge: HOME | End: 2021-05-21
Attending: PHYSICIAN ASSISTANT

## 2021-05-21 LAB
25(OH)D3 SERPL-MCNC: 33.7 NG/ML (ref 30–100)
ALBUMIN SERPL-MCNC: 4.2 G/DL (ref 3.5–5)
ALBUMIN/GLOB SERPL: 1.2 {RATIO} (ref 1.4–2.6)
ALP SERPL-CCNC: 49 U/L (ref 56–155)
ALT SERPL-CCNC: 15 U/L (ref 10–40)
ANION GAP SERPL CALC-SCNC: 14 MMOL/L (ref 8–19)
AST SERPL-CCNC: 13 U/L (ref 15–50)
BASOPHILS # BLD AUTO: 0.04 10*3/UL (ref 0–0.2)
BASOPHILS NFR BLD AUTO: 0.5 % (ref 0–3)
BILIRUB SERPL-MCNC: 0.54 MG/DL (ref 0.2–1.3)
BUN SERPL-MCNC: 22 MG/DL (ref 5–25)
BUN/CREAT SERPL: 14 {RATIO} (ref 6–20)
CALCIUM SERPL-MCNC: 9.1 MG/DL (ref 8.7–10.4)
CHLORIDE SERPL-SCNC: 100 MMOL/L (ref 99–111)
CHOLEST SERPL-MCNC: 175 MG/DL (ref 107–200)
CHOLEST/HDLC SERPL: 2.4 {RATIO} (ref 3–6)
CONV ABS IMM GRAN: 0.05 10*3/UL (ref 0–0.2)
CONV CO2: 28 MMOL/L (ref 22–32)
CONV IMMATURE GRAN: 0.6 % (ref 0–1.8)
CONV TOTAL PROTEIN: 7.7 G/DL (ref 6.3–8.2)
CREAT UR-MCNC: 1.6 MG/DL (ref 0.7–1.2)
DEPRECATED RDW RBC AUTO: 44.9 FL (ref 35.1–43.9)
EOSINOPHIL # BLD AUTO: 0.13 10*3/UL (ref 0–0.7)
EOSINOPHIL # BLD AUTO: 1.5 % (ref 0–7)
ERYTHROCYTE [DISTWIDTH] IN BLOOD BY AUTOMATED COUNT: 14.1 % (ref 11.6–14.4)
GFR SERPLBLD BASED ON 1.73 SQ M-ARVRAT: 42 ML/MIN/{1.73_M2}
GLOBULIN UR ELPH-MCNC: 3.5 G/DL (ref 2–3.5)
GLUCOSE SERPL-MCNC: 101 MG/DL (ref 70–99)
HCT VFR BLD AUTO: 42.7 % (ref 42–52)
HDLC SERPL-MCNC: 74 MG/DL (ref 40–60)
HGB BLD-MCNC: 14.1 G/DL (ref 14–18)
IRON SERPL-MCNC: 85 UG/DL (ref 70–180)
LDLC SERPL CALC-MCNC: 89 MG/DL (ref 70–100)
LYMPHOCYTES # BLD AUTO: 1.43 10*3/UL (ref 1–5)
LYMPHOCYTES NFR BLD AUTO: 16.8 % (ref 20–45)
MCH RBC QN AUTO: 28.8 PG (ref 27–31)
MCHC RBC AUTO-ENTMCNC: 33 G/DL (ref 33–37)
MCV RBC AUTO: 87.1 FL (ref 80–96)
MONOCYTES # BLD AUTO: 0.81 10*3/UL (ref 0.2–1.2)
MONOCYTES NFR BLD AUTO: 9.5 % (ref 3–10)
NEUTROPHILS # BLD AUTO: 6.04 10*3/UL (ref 2–8)
NEUTROPHILS NFR BLD AUTO: 71.1 % (ref 30–85)
NRBC CBCN: 0 % (ref 0–0.7)
OSMOLALITY SERPL CALC.SUM OF ELEC: 289 MOSM/KG (ref 273–304)
PLATELET # BLD AUTO: 282 10*3/UL (ref 130–400)
PMV BLD AUTO: 10.3 FL (ref 9.4–12.4)
POTASSIUM SERPL-SCNC: 4.1 MMOL/L (ref 3.5–5.3)
PSA SERPL-MCNC: 4.46 NG/ML (ref 0–4)
RBC # BLD AUTO: 4.9 10*6/UL (ref 4.7–6.1)
SODIUM SERPL-SCNC: 138 MMOL/L (ref 135–147)
T4 FREE SERPL-MCNC: 1.2 NG/DL (ref 0.9–1.8)
TRIGL SERPL-MCNC: 59 MG/DL (ref 40–150)
TSH SERPL-ACNC: 3.35 M[IU]/L (ref 0.27–4.2)
VLDLC SERPL-MCNC: 12 MG/DL (ref 5–37)
WBC # BLD AUTO: 8.5 10*3/UL (ref 4.8–10.8)

## 2021-05-25 ENCOUNTER — CONVERSION ENCOUNTER (OUTPATIENT)
Dept: SURGERY | Facility: CLINIC | Age: 76
End: 2021-05-25

## 2021-05-25 ENCOUNTER — OFFICE VISIT CONVERTED (OUTPATIENT)
Dept: UROLOGY | Facility: CLINIC | Age: 76
End: 2021-05-25
Attending: NURSE PRACTITIONER

## 2021-06-05 NOTE — PROGRESS NOTES
Progress Note      Patient Name: Reji Mcmillan Sr.   Patient ID: [id number]   Sex: Male   YOB: 1945    Primary Care Provider: Bessie Everett PA-C   Referring Provider: Bessie Everett PA-C    Visit Date: May 25, 2021    Provider: CARSON Blanco   Location: [facility]   Location Address: [address] [place] [address]  [id number]   Location Phone: [phone]          Chief Complaint  · pt here for urological concerns      History Of Present Illness     75 year old  male patient returns for f/u on trimix injections.    He states that he is using 0.05 ml to 0.1 ml with great results.     He states his PCP checked his PSA and it is 4.46 previous year was 3.68.    His rectal exam was WNL in the last few months per patient.    He does state that he is with a slower and weaker stream at times. He is with no nocturia but has quite a bit of urgency when awakening early in the am.    He states that he does have some stinging with ejaculation if he waits too long in between.       Past Medical History  Allergic rhinitis due to allergen; Allergic rhinitis, chronic; Chest pain; Erectile Dysfunction; Erectile dysfunction; GERD; Heart Disease; High blood pressure; High cholesterol; Hypercholesteremia; Hypertension: controlled; Hypothyroidism; Polio; Prostate Disorder         Past Surgical History  Cardiac; Cholecstectomy; Colonoscopy; Heart Bypass; Hernia         Medication List  Ambien 5 mg oral tablet; amlodipine 5 mg oral tablet; Aspir-81 81 mg oral tablet,delayed release (DR/EC); atorvastatin 40 mg oral tablet; Cialis 5 mg oral tablet; iron 325 mg (65 mg iron) oral tablet; lansoprazole 30 mg oral capsule,delayed release(DR/EC); levothyroxine 25 mcg oral tablet; Lipitor 40 mg oral tablet; losartan 100 mg oral tablet; losartan-hydrochlorothiazide 100-12.5 mg oral tablet; Ranexa 500 mg oral tablet extended release 12 hr; Zyrtec 10 mg oral tablet         Allergy  "List  NO KNOWN DRUG ALLERGIES         Family Medical History  Hypercholesterolemia; Heart Disease; Hypertension; Heart Attack (MI)         Social History  Alcohol (Current some day); Caffeine (Current every day); Second hand smoke exposure (Never); Tobacco (Never)         Review of Systems  · Constitutional  o Denies  o : chills, fever  · Gastrointestinal  o Denies  o : nausea, vomiting, flank pain      Vitals  Date Time BP Position Site L\R Cuff Size HR RR TEMP (F) WT  HT  BMI kg/m2 BSA m2 O2 Sat FR L/min FiO2        05/25/2021 08:03 AM       16  174lbs 4oz 5'  10\" 25 1.98             Physical Examination  · Constitutional  o Appearance  o : well-nourished, well developed, alert, in no acute distress  · Head and Face  o Head  o :   § Inspection  § : atraumatic, normocephalic  o Face  o :   § Inspection  § : no facial lesions  · Eyes  o Sclerae  o : sclerae white  · Ears, Nose, Mouth and Throat  o Ears  o :   § External Ears  § : appearance within normal limits, no lesions present  o Nose  o :   § External Nose  § : appearance normal  · Neck  o Inspection/Palpation  o : normal appearance, trachea midline  · Respiratory  o Respiratory Effort  o : breathing unlabored  o Inspection of Chest  o : normal appearance, no retractions  · Skin and Subcutaneous Tissue  o General Inspection  o : no rashes or lesions present, no lesions present, no areas of discoloration  · Neurologic  o Mental Status Examination  o :   § Orientation  § : grossly oriented to person, place and time  § Speech/Language  § : communication ability within normal limits  o Gait and Station  o : normal gait, able to stand without difficulty  · Psychiatric  o Judgement and Insight  o : judgment and insight intact, judgement for everyday activities and social situations within normal limits, insight intact  o Mood and Affect  o : mood normal, affect appropriate              Assessment  · Erectile dysfunction     607.84/N52.9  · Elevated " PSA     790.93/R97.20      Plan  · Orders  o PSA ultrasensitive DIAGNOSTIC [facility] ([id number]) - 790.93/R97.20 - 08/25/2021  · Medications  o Medications have been Reconciled  o Transition of Care or Provider Policy  · Instructions  o continue trimix as prescribed. Did discuss possible treatment with antibiotic therapy for prostatitis to help bring down his PSA however patient is not interested at this point in time and states that his symptoms are not severe enough to warrant antibiotic therapy. We will f/u in3 months with repeat PSA.  o Electronically Identified Patient Education Materials Provided Electronically            Electronically Signed by: CARSON Blanco -Author on May 25, 2021 08:38:45 AM

## 2021-07-15 VITALS — BODY MASS INDEX: 24.95 KG/M2 | HEIGHT: 70 IN | RESPIRATION RATE: 16 BRPM | WEIGHT: 174.25 LBS

## 2021-07-29 RX ORDER — LANSOPRAZOLE 30 MG/1
CAPSULE, DELAYED RELEASE ORAL
COMMUNITY
Start: 2021-05-14 | End: 2022-05-24 | Stop reason: SDUPTHER

## 2021-07-29 RX ORDER — ATORVASTATIN CALCIUM 40 MG/1
40 TABLET, FILM COATED ORAL DAILY
COMMUNITY
Start: 2021-05-06 | End: 2021-09-29

## 2021-07-29 RX ORDER — LEVOTHYROXINE SODIUM 25 MCG
1 TABLET ORAL EVERY MORNING
COMMUNITY
Start: 2021-05-01 | End: 2022-02-21 | Stop reason: SDUPTHER

## 2021-08-12 ENCOUNTER — LAB (OUTPATIENT)
Dept: LAB | Facility: HOSPITAL | Age: 76
End: 2021-08-12

## 2021-08-12 ENCOUNTER — TRANSCRIBE ORDERS (OUTPATIENT)
Dept: LAB | Facility: HOSPITAL | Age: 76
End: 2021-08-12

## 2021-08-12 DIAGNOSIS — R97.20 ELEVATED PROSTATE SPECIFIC ANTIGEN (PSA): Primary | ICD-10-CM

## 2021-08-12 DIAGNOSIS — R97.20 ELEVATED PROSTATE SPECIFIC ANTIGEN (PSA): ICD-10-CM

## 2021-08-12 LAB — PSA SERPL-MCNC: 4.15 NG/ML (ref 0–4)

## 2021-08-12 PROCEDURE — 84153 ASSAY OF PSA TOTAL: CPT

## 2021-08-12 PROCEDURE — 36415 COLL VENOUS BLD VENIPUNCTURE: CPT

## 2021-08-21 PROBLEM — E03.9 HYPOTHYROIDISM: Status: ACTIVE | Noted: 2021-08-21

## 2021-08-22 PROBLEM — G47.00 INSOMNIA: Chronic | Status: ACTIVE | Noted: 2021-08-22

## 2021-08-22 PROBLEM — F41.9 ANXIETY: Chronic | Status: ACTIVE | Noted: 2021-08-22

## 2021-08-22 RX ORDER — LOSARTAN POTASSIUM AND HYDROCHLOROTHIAZIDE 12.5; 1 MG/1; MG/1
1 TABLET ORAL EVERY 24 HOURS
COMMUNITY
End: 2021-12-27

## 2021-08-22 RX ORDER — CETIRIZINE HYDROCHLORIDE 10 MG/1
10 TABLET ORAL DAILY
COMMUNITY

## 2021-08-22 RX ORDER — RANOLAZINE 500 MG/1
1 TABLET, EXTENDED RELEASE ORAL EVERY 12 HOURS SCHEDULED
COMMUNITY
End: 2022-02-21

## 2021-08-22 RX ORDER — PAROXETINE 10 MG/1
1 TABLET, FILM COATED ORAL EVERY 24 HOURS
COMMUNITY
Start: 2021-03-15 | End: 2021-08-23

## 2021-08-22 RX ORDER — AMLODIPINE BESYLATE 10 MG/1
1 TABLET ORAL EVERY 24 HOURS
COMMUNITY
End: 2022-02-28 | Stop reason: SDUPTHER

## 2021-08-22 RX ORDER — ZOLPIDEM TARTRATE 12.5 MG/1
1 TABLET, FILM COATED, EXTENDED RELEASE ORAL NIGHTLY PRN
COMMUNITY
Start: 2021-06-07 | End: 2021-08-23

## 2021-08-22 NOTE — PROGRESS NOTES
"Chief Complaint  Follow-up (Pt is here for regular check up with labs)    Subjective    History of Present Illness:  Reji Mcmillan presents to [facility] for follow-up chronic disease management.  The patient is not having any medication side effects. Labs 3 months ago were reviewed. Denies chest pain, shortness of air, abdominal pain, or change in bowel habits.  He takes Ambien for the past 1 to 2 years for insomnia.  He is unable to stay asleep without taking this.  He needs refill for this today. Followed by Dr. Mata for s/p CABG x 2, Dr. Glynn, urology,  and Dr. Everett (dermatology).    Objective   Vital Signs:   /64 (BP Location: Left arm, Patient Position: Sitting, Cuff Size: Adult)   Pulse 65   Temp 98.2 °F (36.8 °C)   Ht 177.8 cm (70\")   Wt 80 kg (176 lb 6.4 oz)   SpO2 97%   BMI 25.31 kg/m²       Physical Exam :  General: Well nourished, well hydrated, in no acute distress  HEENT: Conjunctiva clear, no exudate bilateral  Neck: Supple, non-tender, no adenopathy, no thyroid enlargement, no bruit  Skin: Warm and dry  Cardiovascular: S1 S2, regular rate and rhythm, no murmur  Respiratory: Clear to auscultation bilateral, no wheezes, rhonchi, or rales  Chest: Normal shape, no deformity, normal work of breathing  Extremities: No lower extremity edema  Neurological: Alert, conversing normally  Psychological: Good eye contact, mood good, and judgment intact        Assessment and Plan:  Diagnoses and all orders for this visit:    1. Essential hypertension (Primary)  Comments:  Stable on medication and to continue current treatment plan.  Orders:  -     CBC & Differential; Future  -     Comprehensive Metabolic Panel; Future  -     Basic Metabolic Panel    2. Hyperlipidemia, unspecified hyperlipidemia type  Comments:  Stable on medication and to continue current treatment plan.  Orders:  -     Lipid Panel; Future    3. Hypothyroidism, unspecified " type  Comments:  Stable on medication and to continue current treatment plan.  Orders:  -     T4, Free; Future  -     TSH; Future    4. Insomnia, unspecified type  Comments:  Stable on medication and to continue.  Orders:  -     zolpidem (AMBIEN) 5 MG tablet; Take 1 tablet by mouth At Night As Needed for Sleep for up to 30 days.  Dispense: 30 tablet; Refill: 5    5. Encounter for long-term (current) use of medications  Comments:  REENA reviewed. Patient is unable to sleep without medication. Advised on controlled substance. Contract signed; to be scanned to chart. UDS  next visit.  Orders:  -     Urine Drug Screen - Urine, Clean Catch; Future    6. Stage 3 chronic kidney disease, unspecified whether stage 3a or 3b CKD (CMS/HCC)  Comments:  Declining GFR noted from 3 months ago.  BMP today and continue to monitor renal function.    7. Vitamin D deficiency  Comments:  Stable on medication and to continue current treatment plan with over-the-counter supplement.  Orders:  -     Vitamin D 25 Hydroxy; Future    8. Anxiety  Comments:  Patient decided not to start Paxil.  He is stable at this time.          Follow Up:  Patient was given instructions and counseling regarding condition. Return in about 6 months (around 2/23/2022) for Recheck.

## 2021-08-23 ENCOUNTER — OFFICE VISIT (OUTPATIENT)
Dept: INTERNAL MEDICINE | Facility: CLINIC | Age: 76
End: 2021-08-23

## 2021-08-23 ENCOUNTER — LAB (OUTPATIENT)
Dept: LAB | Facility: HOSPITAL | Age: 76
End: 2021-08-23

## 2021-08-23 VITALS
HEIGHT: 70 IN | BODY MASS INDEX: 25.25 KG/M2 | HEART RATE: 65 BPM | DIASTOLIC BLOOD PRESSURE: 64 MMHG | TEMPERATURE: 98.2 F | SYSTOLIC BLOOD PRESSURE: 114 MMHG | OXYGEN SATURATION: 97 % | WEIGHT: 176.4 LBS

## 2021-08-23 DIAGNOSIS — E55.9 VITAMIN D DEFICIENCY: ICD-10-CM

## 2021-08-23 DIAGNOSIS — Z79.899 ENCOUNTER FOR LONG-TERM (CURRENT) USE OF MEDICATIONS: ICD-10-CM

## 2021-08-23 DIAGNOSIS — G47.00 INSOMNIA, UNSPECIFIED TYPE: Chronic | ICD-10-CM

## 2021-08-23 DIAGNOSIS — E78.5 HYPERLIPIDEMIA, UNSPECIFIED HYPERLIPIDEMIA TYPE: Chronic | ICD-10-CM

## 2021-08-23 DIAGNOSIS — N18.30 STAGE 3 CHRONIC KIDNEY DISEASE, UNSPECIFIED WHETHER STAGE 3A OR 3B CKD (HCC): ICD-10-CM

## 2021-08-23 DIAGNOSIS — F41.9 ANXIETY: Chronic | ICD-10-CM

## 2021-08-23 DIAGNOSIS — E03.9 HYPOTHYROIDISM, UNSPECIFIED TYPE: Chronic | ICD-10-CM

## 2021-08-23 DIAGNOSIS — I10 ESSENTIAL HYPERTENSION: Primary | Chronic | ICD-10-CM

## 2021-08-23 PROCEDURE — 36415 COLL VENOUS BLD VENIPUNCTURE: CPT | Performed by: NURSE PRACTITIONER

## 2021-08-23 PROCEDURE — 99214 OFFICE O/P EST MOD 30 MIN: CPT | Performed by: NURSE PRACTITIONER

## 2021-08-23 PROCEDURE — 80048 BASIC METABOLIC PNL TOTAL CA: CPT | Performed by: NURSE PRACTITIONER

## 2021-08-23 RX ORDER — ASPIRIN 81 MG/1
81 TABLET ORAL DAILY
COMMUNITY

## 2021-08-23 RX ORDER — CETIRIZINE HYDROCHLORIDE 10 MG/1
10 TABLET ORAL DAILY
COMMUNITY
End: 2021-08-23 | Stop reason: SDUPTHER

## 2021-08-23 RX ORDER — ZOLPIDEM TARTRATE 5 MG/1
5 TABLET ORAL NIGHTLY PRN
COMMUNITY
End: 2021-08-23 | Stop reason: SDUPTHER

## 2021-08-23 RX ORDER — MAG HYDROX/ALUMINUM HYD/SIMETH 400-400-40
1 SUSPENSION, ORAL (FINAL DOSE FORM) ORAL AS NEEDED
COMMUNITY
End: 2021-09-29

## 2021-08-23 RX ORDER — DUPILUMAB 300 MG/2ML
1 INJECTION, SOLUTION SUBCUTANEOUS
COMMUNITY

## 2021-08-23 RX ORDER — ZOLPIDEM TARTRATE 5 MG/1
5 TABLET ORAL NIGHTLY PRN
Qty: 30 TABLET | Refills: 5 | Status: SHIPPED | OUTPATIENT
Start: 2021-08-23 | End: 2021-09-22

## 2021-08-24 ENCOUNTER — OFFICE VISIT (OUTPATIENT)
Dept: UROLOGY | Facility: CLINIC | Age: 76
End: 2021-08-24

## 2021-08-24 VITALS — HEART RATE: 78 BPM | BODY MASS INDEX: 25.34 KG/M2 | WEIGHT: 177 LBS | HEIGHT: 70 IN

## 2021-08-24 DIAGNOSIS — R97.20 ELEVATED PSA: ICD-10-CM

## 2021-08-24 DIAGNOSIS — N40.0 BENIGN PROSTATIC HYPERPLASIA, UNSPECIFIED WHETHER LOWER URINARY TRACT SYMPTOMS PRESENT: Primary | ICD-10-CM

## 2021-08-24 DIAGNOSIS — N18.30 STAGE 3 CHRONIC KIDNEY DISEASE, UNSPECIFIED WHETHER STAGE 3A OR 3B CKD (HCC): Primary | ICD-10-CM

## 2021-08-24 LAB
ANION GAP SERPL CALCULATED.3IONS-SCNC: 12.2 MMOL/L (ref 5–15)
BUN SERPL-MCNC: 21 MG/DL (ref 8–23)
BUN/CREAT SERPL: 13.9 (ref 7–25)
CALCIUM SPEC-SCNC: 9.2 MG/DL (ref 8.6–10.5)
CHLORIDE SERPL-SCNC: 98 MMOL/L (ref 98–107)
CO2 SERPL-SCNC: 26.8 MMOL/L (ref 22–29)
CREAT SERPL-MCNC: 1.51 MG/DL (ref 0.76–1.27)
GFR SERPL CREATININE-BSD FRML MDRD: 45 ML/MIN/1.73
GLUCOSE SERPL-MCNC: 90 MG/DL (ref 65–99)
POTASSIUM SERPL-SCNC: 3.9 MMOL/L (ref 3.5–5.2)
SODIUM SERPL-SCNC: 137 MMOL/L (ref 136–145)

## 2021-08-24 PROCEDURE — 99212 OFFICE O/P EST SF 10 MIN: CPT | Performed by: NURSE PRACTITIONER

## 2021-08-24 RX ORDER — SENNA PLUS 8.6 MG/1
TABLET ORAL
COMMUNITY
End: 2021-09-29

## 2021-08-24 NOTE — PROGRESS NOTES
Chief Complaint: Erectile Dysfunction    Subjective         History of Present Illness  Reji Mcmillan is a 75 y.o. male presents to [facility] to be seen for 3-month follow-up on BPH and elevated PSA.    Patient had PSA repeated on 8/12/2021 which was 4.15.    He states that nocturia is rare.     He is still not very bothered by symptoms.     He reports that trimix has not been as effective but he has only been using 0.10 ml.        Objective     Past Medical History:   Diagnosis Date   • Allergic rhinitis due to allergen    • Chest pain    • Chronic allergic rhinitis    • Eczema    • Erectile dysfunction 10/19/2020   • GERD (gastroesophageal reflux disease)    • Heart disease    • High blood pressure    • High cholesterol    • Hypercholesteremia    • Hypertension     Controlled   • Hypothyroidism    • Polio    • Prostate disorder        Past Surgical History:   Procedure Laterality Date   • CARDIAC SURGERY     • CHOLECYSTECTOMY     • COLONOSCOPY     • CORONARY ARTERY BYPASS GRAFT      cabbage x2   • HERNIA REPAIR           Current Outpatient Medications:   •  amLODIPine (NORVASC) 10 MG tablet, Take 1 tablet by mouth Daily., Disp: , Rfl:   •  aspirin 81 MG EC tablet, Take 81 mg by mouth Daily., Disp: , Rfl:   •  atorvastatin (LIPITOR) 40 MG tablet, , Disp: , Rfl:   •  cetirizine (zyrTEC) 10 MG tablet, Take 10 mg by mouth Daily., Disp: , Rfl:   •  Cholecalciferol 50 MCG (2000 UT) capsule, Take 1 tablet by mouth Daily., Disp: , Rfl:   •  Dupilumab (Dupixent) 300 MG/2ML solution pen-injector, Inject 1 mL under the skin into the appropriate area as directed., Disp: , Rfl:   •  glycerin adult 2 g suppository, Insert 1 suppository into the rectum As Needed., Disp: , Rfl:   •  lansoprazole (PREVACID) 30 MG capsule, , Disp: , Rfl:   •  losartan-hydrochlorothiazide (HYZAAR) 100-12.5 MG per tablet, Take 1 tablet by mouth Daily., Disp: , Rfl:   •  magnesium oxide (MAGOX) 400 (241.3 Mg) MG tablet  "tablet, Take 400 mg by mouth Daily., Disp: , Rfl:   •  ranolazine (Ranexa) 500 MG 12 hr tablet, Take 1 tablet by mouth Every 12 (Twelve) Hours., Disp: , Rfl:   •  senna (Senna-Lax) 8.6 MG tablet, , Disp: , Rfl:   •  Synthroid 25 MCG tablet, Take 1 tablet by mouth Every Morning. Take on empty stomach., Disp: , Rfl:   •  zolpidem (AMBIEN) 5 MG tablet, Take 1 tablet by mouth At Night As Needed for Sleep for up to 30 days., Disp: 30 tablet, Rfl: 5    Allergies   Allergen Reactions   • Seasonal Ic [Cholestatin] Unknown - Low Severity        Family History   Problem Relation Age of Onset   • Hyperlipidemia Mother    • Heart disease Mother    • Hypertension Mother    • Hyperlipidemia Father    • Hypertension Father    • Heart attack Father        Social History     Socioeconomic History   • Marital status:      Spouse name: Not on file   • Number of children: Not on file   • Years of education: Not on file   • Highest education level: Not on file   Tobacco Use   • Smoking status: Never Smoker   • Smokeless tobacco: Never Used   Vaping Use   • Vaping Use: Never used   Substance and Sexual Activity   • Alcohol use: Yes     Comment: drinks rarely; beer and liquor   • Sexual activity: Defer       Vital Signs:   Pulse 78   Ht 177.8 cm (70\")   Wt 80.3 kg (177 lb)   BMI 25.40 kg/m²      Physical Exam  Genitourinary:     Prostate: Enlarged (moderate). Not tender and no nodules present.          Result Review :   The following data was reviewed by: CARSON Henry on 08/24/2021:  Results for orders placed or performed in visit on 08/23/21   Basic Metabolic Panel    Specimen: Blood   Result Value Ref Range    Glucose 90 65 - 99 mg/dL    BUN 21 8 - 23 mg/dL    Creatinine 1.51 (H) 0.76 - 1.27 mg/dL    Sodium 137 136 - 145 mmol/L    Potassium 3.9 3.5 - 5.2 mmol/L    Chloride 98 98 - 107 mmol/L    CO2 26.8 22.0 - 29.0 mmol/L    Calcium 9.2 8.6 - 10.5 mg/dL    eGFR Non African Amer 45 (L) >60 mL/min/1.73    " BUN/Creatinine Ratio 13.9 7.0 - 25.0    Anion Gap 12.2 5.0 - 15.0 mmol/L      PSA    PSA 5/21/21 8/12/21   PSA 4.46 (A) 4.150 (A)   (A) Abnormal value                 Procedures        Assessment and Plan    Diagnoses and all orders for this visit:    1. Benign prostatic hyperplasia, unspecified whether lower urinary tract symptoms present (Primary)    2. Elevated PSA  -     PSA DIAGNOSTIC; Future       Discussed with patient that his PSA is only barely elevated at this point in time and given his digital rectal exam is negative for any findings today we will continue with every 6-month follow-up for the next year.  Discussed with him that in regards to his Trimix he may need to increase his dosage by up to 0.5 mL to gain better response.    I spent 10 minutes caring for Reji on this date of service. This time includes time spent by me in the following activities:reviewing tests, obtaining and/or reviewing a separately obtained history, performing a medically appropriate examination and/or evaluation , counseling and educating the patient/family/caregiver, ordering medications, tests, or procedures, and documenting information in the medical record  Follow Up   Return in about 6 months (around 2/24/2022) for f/u PSA .  Patient was given instructions and counseling regarding his condition or for health maintenance advice. Please see specific information pulled into the AVS if appropriate.         This document has been electronically signed by CARSON Henry  August 24, 2021 13:30 EDT

## 2021-09-23 ENCOUNTER — TELEPHONE (OUTPATIENT)
Dept: UROLOGY | Facility: CLINIC | Age: 76
End: 2021-09-23

## 2021-09-23 DIAGNOSIS — N40.0 BENIGN PROSTATIC HYPERPLASIA, UNSPECIFIED WHETHER LOWER URINARY TRACT SYMPTOMS PRESENT: Primary | ICD-10-CM

## 2021-09-23 RX ORDER — TAMSULOSIN HYDROCHLORIDE 0.4 MG/1
1 CAPSULE ORAL DAILY
Qty: 90 CAPSULE | Refills: 3 | Status: SHIPPED | OUTPATIENT
Start: 2021-09-23 | End: 2022-02-22

## 2021-09-23 NOTE — TELEPHONE ENCOUNTER
Please inform patient that this medication could potentially cause some worsening erectile dysfunction and potential dizziness.  He will need to take this at night.

## 2021-09-23 NOTE — TELEPHONE ENCOUNTER
Patient asked for a prescription for Flomax to go to [facility].  Said he discussed with Parul at last visit.  He is getting up more at night to urinate. He said he was told to call if he had issue, and she would prescribe.

## 2021-09-24 PROBLEM — I25.810 CORONARY ARTERY DISEASE INVOLVING AUTOLOGOUS VEIN CORONARY BYPASS GRAFT WITHOUT ANGINA PECTORIS: Status: ACTIVE | Noted: 2021-09-24

## 2021-09-24 NOTE — PROGRESS NOTES
[facility]   Cardiology Consult Note    Patient Name: Reji Mcmillan  : 1945  MRN: [id number]  Primary Care Physician:  Kenzie Mccloud APRN  Referring Physician: No ref. provider found  Date of admission: (Not on file)    Subjective   Subjective     Reason for Consult/ Chief Complaint: palpitations    HPI:  Reji Mcmillan is a 76 y.o. male has history of palpitations on and off for *** days. Palpitations last for few minutes each time, relieved spontaneously, Patient has chest pain associated with the palpitations lasts for a few minutes and relieves spontaneously, No SOB. No syncopal or presyncopal episodes. ***    Review of Systems  All systems were reviewed and negative except for: Palpitations and chest pain      Personal History     Past Medical History:   Diagnosis Date   • Allergic rhinitis due to allergen    • Chest pain    • Chronic allergic rhinitis    • Eczema    • Erectile dysfunction 10/19/2020   • GERD (gastroesophageal reflux disease)    • Heart disease    • High blood pressure    • High cholesterol    • Hypercholesteremia    • Hypertension     Controlled   • Hypothyroidism    • Polio    • Prostate disorder         ***      Family History: family history includes Heart attack in his father; Heart disease in his mother; Hyperlipidemia in his father and mother; Hypertension in his father and mother. Otherwise pertinent FHx was reviewed and not pertinent to current issue.    Social History:  reports that he has never smoked. He has never used smokeless tobacco. He reports current alcohol use. He reports that he does not use drugs.    Home Medications:  Cholecalciferol, Dupilumab, Zolpidem Tartrate, amLODIPine, aspirin, atorvastatin, cetirizine, glycerin adult, lansoprazole, levothyroxine, losartan-hydrochlorothiazide, magnesium oxide, ranolazine, senna, and tamsulosin    Allergies:  No Known Allergies    Objective    Objective     Vitals:   Heart Rate:  [59] 59  BP: (126)/(56)  126/56      Physical Exam:***   Constitutional: Awake, alert, No acute distress    Eyes: PERRLA, sclerae anicteric, no conjunctival injection   HENT: NCAT, mucous membranes moist   Neck: Supple, no thyromegaly, no lymphadenopathy, trachea midline   Respiratory: Clear to auscultation bilaterally, nonlabored respirations    Cardiovascular: RRR, no murmurs, rubs, or gallops, palpable pedal pulses bilaterally   Gastrointestinal: Positive bowel sounds, soft, nontender, nondistended   Musculoskeletal: No bilateral ankle edema, no clubbing or cyanosis to extremities   Psychiatric: Appropriate affect, cooperative   Neurologic: Oriented x 3, strength symmetric in all extremities, Cranial Nerves grossly intact to confrontation, speech clear   Skin: No rashes ***    Result Review    Result Review:  I have personally reviewed the results from the time of this admission to 9/29/2021 11:14 EDT and agree with these findings:  [x]  Laboratory  [x]  EKG  [x]  Cardiology/Vascular   []  Pathology  [x]  Old records            Assessment / Plan     Impression: 1. Palpitations                2. Chest Pain      Plan: ***  Patient was asked to be on a low caffeine diet  Echocardiogram  Treadmill Stress Test to rule out significant ischemia  24 hour Holter will be ordered  Start Beta-Blockers if patient continues to have palpitations after Holter monitoring.  See me back in office in 4-6 weeks.    Electronically signed by Jon Mata MD, 09/29/21, 11:14 AM EDT.Chief Complaint  Follow-up (6 mos, post labs), Hypertension, Hyperlipidemia, and CAD s/p CABG    Subjective    ***  Past Medical History:   Diagnosis Date   • Allergic rhinitis due to allergen    • Chest pain    • Chronic allergic rhinitis    • Eczema    • Erectile dysfunction 10/19/2020   • GERD (gastroesophageal reflux disease)    • Heart disease    • High blood pressure    • High cholesterol    • Hypercholesteremia    • Hypertension     Controlled   • Hypothyroidism    • Polio     • Prostate disorder          Current Outpatient Medications:   •  amLODIPine (NORVASC) 10 MG tablet, Take 1 tablet by mouth Daily., Disp: , Rfl:   •  aspirin 81 MG EC tablet, Take 81 mg by mouth Daily., Disp: , Rfl:   •  atorvastatin (LIPITOR) 40 MG tablet, Take 40 mg by mouth Daily., Disp: , Rfl:   •  cetirizine (zyrTEC) 10 MG tablet, Take 10 mg by mouth Daily., Disp: , Rfl:   •  Cholecalciferol 50 MCG (2000 UT) capsule, Take 1 tablet by mouth Daily., Disp: , Rfl:   •  Dupilumab (Dupixent) 300 MG/2ML solution pen-injector, Inject 1 mL under the skin into the appropriate area as directed., Disp: , Rfl:   •  lansoprazole (PREVACID) 30 MG capsule, , Disp: , Rfl:   •  losartan-hydrochlorothiazide (HYZAAR) 100-12.5 MG per tablet, Take 1 tablet by mouth Daily., Disp: , Rfl:   •  magnesium oxide (MAGOX) 400 (241.3 Mg) MG tablet tablet, Take 400 mg by mouth Daily., Disp: , Rfl:   •  ranolazine (Ranexa) 500 MG 12 hr tablet, Take 1 tablet by mouth Every 12 (Twelve) Hours., Disp: , Rfl:   •  Synthroid 25 MCG tablet, Take 1 tablet by mouth Every Morning. Take on empty stomach., Disp: , Rfl:   •  ZOLPIDEM TARTRATE PO, Take  by mouth As Needed., Disp: , Rfl:   •  glycerin adult 2 g suppository, Insert 1 suppository into the rectum As Needed., Disp: , Rfl:   •  senna (Senna-Lax) 8.6 MG tablet, , Disp: , Rfl:   •  tamsulosin (FLOMAX) 0.4 MG capsule 24 hr capsule, Take 1 capsule by mouth Daily., Disp: 90 capsule, Rfl: 3    There are no discontinued medications.  No Known Allergies     Social History     Tobacco Use   • Smoking status: Never Smoker   • Smokeless tobacco: Never Used   Vaping Use   • Vaping Use: Never used   Substance Use Topics   • Alcohol use: Yes     Comment: drinks rarely; beer and liquor   • Drug use: Never       Family History   Problem Relation Age of Onset   • Hyperlipidemia Mother    • Heart disease Mother    • Hypertension Mother    • Hyperlipidemia Father    • Hypertension Father    • Heart attack Father   "       Objective     /56   Pulse 59   Ht 177.8 cm (70\")   Wt 80.7 kg (178 lb)   BMI 25.54 kg/m²       Physical Exam    General Appearance:   · no acute distress  · Alert and oriented x3  HENT:   · lips not cyanotic  · Atraumatic  Neck:  · No jvd   · supple  Respiratory:  · no respiratory distress  · normal breath sounds  · no rales  Cardiovascular:  · ***  · no S3, no S4   · no murmur  · no rub  Extremities  · No cyanosis  · lower extremity edema: none    Skin:   · warm, dry  · No rashes      Result Review :{Labs  Result Review  Imaging  Med Tab  Media :23}   {The following data was reviewed by (Optional):[id number]}  No results found for: PROBNP  CMP    CMP 3/11/21 5/21/21 8/23/21   Glucose   90   Glucose 98 101 (A)    BUN 16 22 21   Creatinine 1.37 (A) 1.60 (A) 1.51 (A)   eGFR Non African Am   45 (A)   Sodium 138 138 137   Potassium 3.8 4.1 3.9   Chloride 101 100 98   Calcium 8.9 9.1 9.2   Albumin 4.1 4.2    Total Bilirubin 0.63 0.54    Alkaline Phosphatase 55 (A) 49 (A)    AST (SGOT) 21 13 (A)    ALT (SGPT) 17 15    (A) Abnormal value            CBC w/diff    CBC w/Diff 3/11/21 5/21/21   WBC 6.57 8.50   RBC 4.89 4.90   Hemoglobin 13.8 (A) 14.1   Hematocrit 41.9 (A) 42.7   MCV 85.7 87.1   MCH 28.2 28.8   MCHC 32.9 (A) 33.0   RDW 13.3 14.1   Platelets 237 282   Neutrophil Rel % 61.9 71.1   Lymphocyte Rel % 21.0 16.8 (A)   Monocyte Rel % 11.1 (A) 9.5   Eosinophil Rel % 5.3 1.5   Basophil Rel % 0.5 0.5   (A) Abnormal value             Lab Results   Component Value Date    TSH 3.350 05/21/2021      Lab Results   Component Value Date    FREET4 1.2 05/21/2021      No results found for: DDIMERQUANT  No results found for: MG   No results found for: DIGOXIN   No results found for: TROPONINT        Lipid Panel    Lipid Panel 3/11/21 5/21/21   Total Cholesterol 153 175   Triglycerides 58 59   HDL Cholesterol 75 (A) 74 (A)   VLDL Cholesterol 12 12   LDL Cholesterol  66 (A) 89   (A) Abnormal value       Comments are " available for some flowsheets but are not being displayed.           No results found for: POCTROP                   There are no diagnoses linked to this encounter.        Follow Up {Instructions Charge Capture  Follow-up Communications :23}    No follow-ups on file.          Patient was given instructions and counseling regarding his condition or for health maintenance advice. Please see specific information pulled into the AVS if appropriate.

## 2021-09-29 ENCOUNTER — OFFICE VISIT (OUTPATIENT)
Dept: CARDIOLOGY | Facility: CLINIC | Age: 76
End: 2021-09-29

## 2021-09-29 VITALS
DIASTOLIC BLOOD PRESSURE: 56 MMHG | BODY MASS INDEX: 25.48 KG/M2 | HEART RATE: 59 BPM | WEIGHT: 178 LBS | HEIGHT: 70 IN | SYSTOLIC BLOOD PRESSURE: 126 MMHG

## 2021-09-29 DIAGNOSIS — I10 ESSENTIAL HYPERTENSION: ICD-10-CM

## 2021-09-29 DIAGNOSIS — E78.5 HYPERLIPIDEMIA LDL GOAL <70: ICD-10-CM

## 2021-09-29 DIAGNOSIS — I25.810 CORONARY ARTERY DISEASE INVOLVING AUTOLOGOUS VEIN CORONARY BYPASS GRAFT WITHOUT ANGINA PECTORIS: Primary | ICD-10-CM

## 2021-09-29 PROCEDURE — 99214 OFFICE O/P EST MOD 30 MIN: CPT | Performed by: INTERNAL MEDICINE

## 2021-09-29 RX ORDER — ATORVASTATIN CALCIUM 80 MG/1
80 TABLET, FILM COATED ORAL DAILY
Qty: 90 TABLET | Refills: 3 | Status: SHIPPED | OUTPATIENT
Start: 2021-09-29 | End: 2022-09-06

## 2021-09-29 NOTE — ASSESSMENT & PLAN NOTE
Pressure improved from last visit continue with current dose of Norvasc 10 mg once a day and losartan HCTZ 100/12.5 mg a day

## 2021-09-29 NOTE — ASSESSMENT & PLAN NOTE
Lipids were increased above 70 we will increase Lipitor to 80 nightly repeat lipids LFTs on next visit

## 2021-09-29 NOTE — PATIENT INSTRUCTIONS
Cholesterol Content in Foods  Cholesterol is a waxy, fat-like substance that helps to carry fat in the blood. The body needs cholesterol in small amounts, but too much cholesterol can cause damage to the arteries and heart.  Most people should eat less than 200 milligrams (mg) of cholesterol a day.  Foods with cholesterol    Cholesterol is found in animal-based foods, such as meat, seafood, and dairy. Generally, low-fat dairy and lean meats have less cholesterol than full-fat dairy and fatty meats. The milligrams of cholesterol per serving (mg per serving) of common cholesterol-containing foods are listed below.  Meat and other proteins  · Egg -- one large whole egg has 186 mg.  · Veal shank -- 4 oz has 141 mg.  · Lean ground turkey (93% lean) -- 4 oz has 118 mg.  · Fat-trimmed lamb loin -- 4 oz has 106 mg.  · Lean ground beef (90% lean) -- 4 oz has 100 mg.  · Lobster -- 3.5 oz has 90 mg.  · Pork loin chops -- 4 oz has 86 mg.  · Canned salmon -- 3.5 oz has 83 mg.  · Fat-trimmed beef top loin -- 4 oz has 78 mg.  · Frankfurter -- 1 rodney (3.5 oz) has 77 mg.  · Crab -- 3.5 oz has 71 mg.  · Roasted chicken without skin, white meat -- 4 oz has 66 mg.  · Light bologna -- 2 oz has 45 mg.  · Deli-cut turkey -- 2 oz has 31 mg.  · Canned tuna -- 3.5 oz has 31 mg.  · Duff -- 1 oz has 29 mg.  · Oysters and mussels (raw) -- 3.5 oz has 25 mg.  · Mackerel -- 1 oz has 22 mg.  · Trout -- 1 oz has 20 mg.  · Pork sausage -- 1 link (1 oz) has 17 mg.  · [place] -- 1 oz has 16 mg.  · Tilapia -- 1 oz has 14 mg.  Dairy  · Soft-serve ice cream -- ½ cup (4 oz) has 103 mg.  · Whole-milk yogurt -- 1 cup (8 oz) has 29 mg.  · Cheddar cheese -- 1 oz has 28 mg.  · American cheese -- 1 oz has 28 mg.  · Whole milk -- 1 cup (8 oz) has 23 mg.  · 2% milk -- 1 cup (8 oz) has 18 mg.  · Cream cheese -- 1 tablespoon (Tbsp) has 15 mg.  · Cottage cheese -- ½ cup (4 oz) has 14 mg.  · Low-fat (1%) milk -- 1 cup (8 oz) has 10 mg.  · Sour cream -- 1 Tbsp has 8.5  mg.  · Low-fat yogurt -- 1 cup (8 oz) has 8 mg.  · Nonfat Greek yogurt -- 1 cup (8 oz) has 7 mg.  · Half-and-half cream -- 1 Tbsp has 5 mg.  Fats and oils  · Cod liver oil -- 1 tablespoon (Tbsp) has 82 mg.  · Butter -- 1 Tbsp has 15 mg.  · Lard -- 1 Tbsp has 14 mg.  · Duff grease -- 1 Tbsp has 14 mg.  · Mayonnaise -- 1 Tbsp has 5-10 mg.  · Margarine -- 1 Tbsp has 3-10 mg.  Exact amounts of cholesterol in these foods may vary depending on specific ingredients and brands.  Foods without cholesterol  Most plant-based foods do not have cholesterol unless you combine them with a food that has cholesterol. Foods without cholesterol include:  · Grains and cereals.  · Vegetables.  · Fruits.  · Vegetable oils, such as olive, canola, and sunflower oil.  · Legumes, such as peas, beans, and lentils.  · Nuts and seeds.  · Egg whites.  Summary  · The body needs cholesterol in small amounts, but too much cholesterol can cause damage to the arteries and heart.  · Most people should eat less than 200 milligrams (mg) of cholesterol a day.  This information is not intended to replace advice given to you by your health care provider. Make sure you discuss any questions you have with your health care provider.  Document Revised: 05/10/2021 Document Reviewed: 05/10/2021  Elsevier Patient Education © 2021 Elsevier Inc.

## 2021-09-29 NOTE — PROGRESS NOTES
Chief Complaint  Follow-up (6 mos, post labs), Hypertension, Hyperlipidemia, and CAD s/p CABG    Subjective    Patient is doing well with no active complaints of chest pain or increased risk of breath    Past Medical History:   Diagnosis Date   • Allergic rhinitis due to allergen    • Chest pain    • Chronic allergic rhinitis    • Eczema    • Erectile dysfunction 10/19/2020   • GERD (gastroesophageal reflux disease)    • Heart disease    • High blood pressure    • High cholesterol    • Hypercholesteremia    • Hypertension     Controlled   • Hypothyroidism    • Polio    • Prostate disorder          Current Outpatient Medications:   •  amLODIPine (NORVASC) 10 MG tablet, Take 1 tablet by mouth Daily., Disp: , Rfl:   •  aspirin 81 MG EC tablet, Take 81 mg by mouth Daily., Disp: , Rfl:   •  cetirizine (zyrTEC) 10 MG tablet, Take 10 mg by mouth Daily., Disp: , Rfl:   •  Cholecalciferol 50 MCG (2000 UT) capsule, Take 1 tablet by mouth Daily., Disp: , Rfl:   •  Dupilumab (Dupixent) 300 MG/2ML solution pen-injector, Inject 1 mL under the skin into the appropriate area as directed., Disp: , Rfl:   •  lansoprazole (PREVACID) 30 MG capsule, , Disp: , Rfl:   •  losartan-hydrochlorothiazide (HYZAAR) 100-12.5 MG per tablet, Take 1 tablet by mouth Daily., Disp: , Rfl:   •  magnesium oxide (MAGOX) 400 (241.3 Mg) MG tablet tablet, Take 400 mg by mouth Daily., Disp: , Rfl:   •  ranolazine (Ranexa) 500 MG 12 hr tablet, Take 1 tablet by mouth Every 12 (Twelve) Hours., Disp: , Rfl:   •  Synthroid 25 MCG tablet, Take 1 tablet by mouth Every Morning. Take on empty stomach., Disp: , Rfl:   •  ZOLPIDEM TARTRATE PO, Take  by mouth As Needed., Disp: , Rfl:   •  atorvastatin (LIPITOR) 80 MG tablet, Take 1 tablet by mouth Daily., Disp: 90 tablet, Rfl: 3  •  tamsulosin (FLOMAX) 0.4 MG capsule 24 hr capsule, Take 1 capsule by mouth Daily., Disp: 90 capsule, Rfl: 3    Medications Discontinued During This Encounter   Medication Reason   • glycerin  "adult 2 g suppository    • senna (Senna-Lax) 8.6 MG tablet    • atorvastatin (LIPITOR) 40 MG tablet      No Known Allergies     Social History     Tobacco Use   • Smoking status: Never Smoker   • Smokeless tobacco: Never Used   Vaping Use   • Vaping Use: Never used   Substance Use Topics   • Alcohol use: Yes     Comment: drinks rarely; beer and liquor   • Drug use: Never       Family History   Problem Relation Age of Onset   • Hyperlipidemia Mother    • Heart disease Mother    • Hypertension Mother    • Hyperlipidemia Father    • Hypertension Father    • Heart attack Father         Objective     /56   Pulse 59   Ht 177.8 cm (70\")   Wt 80.7 kg (178 lb)   BMI 25.54 kg/m²       Physical Exam    General Appearance:   · no acute distress  · Alert and oriented x3  HENT:   · lips not cyanotic  · Atraumatic  Neck:  · No jvd   · supple  Respiratory:  · no respiratory distress  · normal breath sounds  · no rales  Cardiovascular:  · Regular rate and rhythm  · no S3, no S4   · no murmur  · no rub  Extremities  · No cyanosis  · lower extremity edema: none    Skin:   · warm, dry  · No rashes      Result Review :     No results found for: PROBNP  CMP    CMP 3/11/21 5/21/21 8/23/21   Glucose   90   Glucose 98 101 (A)    BUN 16 22 21   Creatinine 1.37 (A) 1.60 (A) 1.51 (A)   eGFR Non African Am   45 (A)   Sodium 138 138 137   Potassium 3.8 4.1 3.9   Chloride 101 100 98   Calcium 8.9 9.1 9.2   Albumin 4.1 4.2    Total Bilirubin 0.63 0.54    Alkaline Phosphatase 55 (A) 49 (A)    AST (SGOT) 21 13 (A)    ALT (SGPT) 17 15    (A) Abnormal value            CBC w/diff    CBC w/Diff 3/11/21 5/21/21   WBC 6.57 8.50   RBC 4.89 4.90   Hemoglobin 13.8 (A) 14.1   Hematocrit 41.9 (A) 42.7   MCV 85.7 87.1   MCH 28.2 28.8   MCHC 32.9 (A) 33.0   RDW 13.3 14.1   Platelets 237 282   Neutrophil Rel % 61.9 71.1   Lymphocyte Rel % 21.0 16.8 (A)   Monocyte Rel % 11.1 (A) 9.5   Eosinophil Rel % 5.3 1.5   Basophil Rel % 0.5 0.5   (A) Abnormal value "             Lab Results   Component Value Date    TSH 3.350 05/21/2021      Lab Results   Component Value Date    FREET4 1.2 05/21/2021      No results found for: DDIMERQUANT  No results found for: MG   No results found for: DIGOXIN   No results found for: TROPONINT        Lipid Panel    Lipid Panel 3/11/21 5/21/21   Total Cholesterol 153 175   Triglycerides 58 59   HDL Cholesterol 75 (A) 74 (A)   VLDL Cholesterol 12 12   LDL Cholesterol  66 (A) 89   (A) Abnormal value       Comments are available for some flowsheets but are not being displayed.           No results found for: POCTROP                   Diagnoses and all orders for this visit:    1. CAD s/p CABG (Primary)  Assessment & Plan:  No ongoing angina continue chronic aspirin 8 1 mg once a day      2. Hyperlipidemia LDL goal <70  Assessment & Plan:  Lipids were increased above 70 we will increase Lipitor to 80 nightly repeat lipids LFTs on next visit      3. Essential hypertension  Assessment & Plan:  Pressure improved from last visit continue with current dose of Norvasc 10 mg once a day and losartan HCTZ 100/12.5 mg a day      Other orders  -     atorvastatin (LIPITOR) 80 MG tablet; Take 1 tablet by mouth Daily.  Dispense: 90 tablet; Refill: 3          Follow Up     Return in about 6 months (around 3/29/2022) for EKG with F/U, Follow with Varsha Jang.          Patient was given instructions and counseling regarding his condition or for health maintenance advice. Please see specific information pulled into the AVS if appropriate.

## 2021-10-12 ENCOUNTER — TELEPHONE (OUTPATIENT)
Dept: INTERNAL MEDICINE | Facility: CLINIC | Age: 76
End: 2021-10-12

## 2021-10-12 DIAGNOSIS — G47.00 INSOMNIA, UNSPECIFIED TYPE: Primary | ICD-10-CM

## 2021-10-12 RX ORDER — TRAZODONE HYDROCHLORIDE 50 MG/1
50 TABLET ORAL NIGHTLY
Qty: 30 TABLET | Refills: 5 | Status: SHIPPED | OUTPATIENT
Start: 2021-10-12 | End: 2021-11-12

## 2021-11-10 ENCOUNTER — TELEPHONE (OUTPATIENT)
Dept: INTERNAL MEDICINE | Facility: CLINIC | Age: 76
End: 2021-11-10

## 2021-11-10 NOTE — TELEPHONE ENCOUNTER
The pt states the trazadone isnt keeping him asleep. He is waking up around 2 or 3. He wants to know if we can up the dose or give him something else.

## 2021-11-12 ENCOUNTER — OFFICE VISIT (OUTPATIENT)
Dept: INTERNAL MEDICINE | Facility: CLINIC | Age: 76
End: 2021-11-12

## 2021-11-12 VITALS
WEIGHT: 183.2 LBS | DIASTOLIC BLOOD PRESSURE: 61 MMHG | TEMPERATURE: 98 F | OXYGEN SATURATION: 98 % | BODY MASS INDEX: 26.29 KG/M2 | SYSTOLIC BLOOD PRESSURE: 119 MMHG | HEART RATE: 63 BPM

## 2021-11-12 DIAGNOSIS — G47.00 INSOMNIA, UNSPECIFIED TYPE: Primary | Chronic | ICD-10-CM

## 2021-11-12 DIAGNOSIS — F32.A ANXIETY AND DEPRESSION: Chronic | ICD-10-CM

## 2021-11-12 DIAGNOSIS — F41.9 ANXIETY AND DEPRESSION: Chronic | ICD-10-CM

## 2021-11-12 PROCEDURE — 99214 OFFICE O/P EST MOD 30 MIN: CPT | Performed by: NURSE PRACTITIONER

## 2021-11-12 RX ORDER — ZOLPIDEM TARTRATE 12.5 MG/1
12.5 TABLET, FILM COATED, EXTENDED RELEASE ORAL NIGHTLY PRN
Qty: 90 TABLET | Refills: 0 | Status: SHIPPED | OUTPATIENT
Start: 2021-11-12 | End: 2022-01-28 | Stop reason: DRUGHIGH

## 2021-11-12 RX ORDER — ZOLPIDEM TARTRATE 12.5 MG/1
12.5 TABLET, FILM COATED, EXTENDED RELEASE ORAL NIGHTLY PRN
Qty: 90 TABLET | Refills: 0 | Status: CANCELLED | OUTPATIENT
Start: 2021-11-12

## 2021-11-12 NOTE — TELEPHONE ENCOUNTER
The pt needs a 90 day supply of ambien sent in. pato doesn't know if she can do this yet. But you have done this in the past. Can you send this in topete the pt? [facility]

## 2021-11-12 NOTE — PROGRESS NOTES
Chief Complaint  Medication Problem (pt states Trazadone is not helping keep him asleep, states it helps him fall asleep but he wakes up every morning between 2 and 3am.) and Medication Question (pt was prescribed Paxil 10mg, but has never taken it and wants to know if he should)  Subjective        History of Present Illness  Reji Mcmillan is a 76 y.o. male who presents to [facility] for follow-up of insomnia. He is able to fall asleep on Trazadone but can not stay asleep all night. He reports that he did sleep all night on Ambien CR. He had been on Ambien for greater than 2 years and it was the only medication that would work. He admits to mild depression and anxiety. He has a  Prescription for Paxil 10 mg but has not started it. He is worried about side effects.     Also follows with Dr. Mata  s/p CABG x 2,  Dr. Glynn (urology), and Dr. Everett (derm)      Current Outpatient Medications:   •  amLODIPine (NORVASC) 10 MG tablet, Take 1 tablet by mouth Daily., Disp: , Rfl:   •  aspirin 81 MG EC tablet, Take 81 mg by mouth Daily., Disp: , Rfl:   •  atorvastatin (LIPITOR) 80 MG tablet, Take 1 tablet by mouth Daily., Disp: 90 tablet, Rfl: 3  •  cetirizine (zyrTEC) 10 MG tablet, Take 10 mg by mouth Daily., Disp: , Rfl:   •  Dupilumab (Dupixent) 300 MG/2ML solution pen-injector, Inject 1 mL under the skin into the appropriate area as directed., Disp: , Rfl:   •  lansoprazole (PREVACID) 30 MG capsule, , Disp: , Rfl:   •  losartan-hydrochlorothiazide (HYZAAR) 100-12.5 MG per tablet, Take 1 tablet by mouth Daily., Disp: , Rfl:   •  magnesium oxide (MAGOX) 400 (241.3 Mg) MG tablet tablet, Take 400 mg by mouth Daily., Disp: , Rfl:   •  ranolazine (Ranexa) 500 MG 12 hr tablet, Take 1 tablet by mouth Every 12 (Twelve) Hours., Disp: , Rfl:   •  Synthroid 25 MCG tablet, Take 1 tablet by mouth Every Morning. Take on empty stomach., Disp: , Rfl:   •  tamsulosin (FLOMAX) 0.4 MG capsule 24  "hr capsule, Take 1 capsule by mouth Daily., Disp: 90 capsule, Rfl: 3  •  Cholecalciferol 50 MCG (2000 UT) capsule, Take 1 tablet by mouth Daily., Disp: , Rfl:   •  zolpidem CR (Ambien CR) 12.5 MG CR tablet, Take 1 tablet by mouth At Night As Needed for Sleep., Disp: 90 tablet, Rfl: 0  Objective   /61 (BP Location: Right arm, Patient Position: Sitting, Cuff Size: Adult)   Pulse 63   Temp 98 °F (36.7 °C) (Temporal)   Wt 83.1 kg (183 lb 3.2 oz)   SpO2 98%   BMI 26.29 kg/m²    Estimated body mass index is 26.29 kg/m² as calculated from the following:    Height as of 9/29/21: 177.8 cm (70\").    Weight as of this encounter: 83.1 kg (183 lb 3.2 oz).   Physical Exam  Vitals reviewed.   Constitutional:       General: He is not in acute distress.     Appearance: Normal appearance.   HENT:      Head: Normocephalic and atraumatic.   Eyes:      Conjunctiva/sclera: Conjunctivae normal.   Pulmonary:      Effort: Pulmonary effort is normal.   Neurological:      General: No focal deficit present.      Mental Status: He is alert.   Psychiatric:         Mood and Affect: Mood normal.         Behavior: Behavior normal.         Thought Content: Thought content normal.         Judgment: Judgment normal.        Result Review :                   Assessment and Plan    Diagnoses and all orders for this visit:    1. Insomnia, unspecified type (Primary)  Overview:  Stop Trazadone and restart Ambien CR 12.5 mg. Patient is aware that it is a controlled substance and addictive.      2. Anxiety and depression  Overview:  Discussed medication and side effects. The patient agrees to try the medication and will follow up if not working or side effects.      Follow Up     Patient was given instructions and counseling regarding his condition or for health maintenance advice. Please see specific information pulled into the AVS if appropriate.   Return for Next scheduled follow up, or if symptoms worsen or fail to improve.    Kenzie Mccloud, " APRN

## 2021-11-14 PROBLEM — N18.30 STAGE 3 CHRONIC KIDNEY DISEASE: Status: ACTIVE | Noted: 2021-11-14

## 2021-11-14 PROBLEM — F41.9 ANXIETY AND DEPRESSION: Status: ACTIVE | Noted: 2021-08-22

## 2021-11-14 PROBLEM — F32.A ANXIETY AND DEPRESSION: Status: ACTIVE | Noted: 2021-08-22

## 2021-12-07 RX ORDER — PAROXETINE 10 MG/1
10 TABLET, FILM COATED ORAL EVERY MORNING
COMMUNITY
End: 2021-12-07 | Stop reason: SDUPTHER

## 2021-12-07 RX ORDER — PAROXETINE 10 MG/1
10 TABLET, FILM COATED ORAL EVERY MORNING
Qty: 90 TABLET | Refills: 1 | Status: SHIPPED | OUTPATIENT
Start: 2021-12-07 | End: 2022-02-28 | Stop reason: SDUPTHER

## 2021-12-09 ENCOUNTER — TRANSCRIBE ORDERS (OUTPATIENT)
Dept: ADMINISTRATIVE | Facility: HOSPITAL | Age: 76
End: 2021-12-09

## 2021-12-09 DIAGNOSIS — N18.30 STAGE 3 CHRONIC KIDNEY DISEASE, UNSPECIFIED WHETHER STAGE 3A OR 3B CKD (HCC): Primary | ICD-10-CM

## 2021-12-27 RX ORDER — LOSARTAN POTASSIUM AND HYDROCHLOROTHIAZIDE 12.5; 1 MG/1; MG/1
TABLET ORAL
Qty: 90 TABLET | Refills: 2 | Status: SHIPPED | OUTPATIENT
Start: 2021-12-27 | End: 2022-09-21

## 2022-01-06 ENCOUNTER — HOSPITAL ENCOUNTER (OUTPATIENT)
Dept: ULTRASOUND IMAGING | Facility: HOSPITAL | Age: 77
Discharge: HOME | End: 2022-01-06
Admitting: INTERNAL MEDICINE

## 2022-01-06 DIAGNOSIS — N18.30 STAGE 3 CHRONIC KIDNEY DISEASE, UNSPECIFIED WHETHER STAGE 3A OR 3B CKD: ICD-10-CM

## 2022-01-06 PROCEDURE — 76775 US EXAM ABDO BACK WALL LIM: CPT

## 2022-01-26 ENCOUNTER — TELEPHONE (OUTPATIENT)
Dept: INTERNAL MEDICINE | Facility: CLINIC | Age: 77
End: 2022-01-26

## 2022-01-26 NOTE — TELEPHONE ENCOUNTER
Caller: Reji Mcmillan    Relationship: Self    Best call back number: [phone]     What is the best time to reach you: ANYTIME    Who are you requesting to speak with (clinical staff, provider,  specific staff member): CLINICAL     Do you know the name of the person who called: ELADIO    What was the call regarding: PATIENT RECEIVED A LETTER FROM [employer] STATING THEY WANT TO CHANGE HIS AMBIEN FROM EXTENDED RELEASE TO REGULAR.     Do you require a callback: PLEASE CALL BACK AND ADVISE

## 2022-01-28 DIAGNOSIS — G47.00 INSOMNIA, UNSPECIFIED TYPE: Primary | ICD-10-CM

## 2022-01-28 RX ORDER — ZOLPIDEM TARTRATE 5 MG/1
5 TABLET ORAL NIGHTLY PRN
Qty: 30 TABLET | Refills: 2 | Status: SHIPPED | OUTPATIENT
Start: 2022-01-28 | End: 2022-02-28

## 2022-02-21 ENCOUNTER — LAB (OUTPATIENT)
Dept: LAB | Facility: HOSPITAL | Age: 77
End: 2022-02-21

## 2022-02-21 DIAGNOSIS — I10 ESSENTIAL HYPERTENSION: ICD-10-CM

## 2022-02-21 DIAGNOSIS — R97.20 ELEVATED PSA: ICD-10-CM

## 2022-02-21 DIAGNOSIS — E03.9 HYPOTHYROIDISM, UNSPECIFIED TYPE: Chronic | ICD-10-CM

## 2022-02-21 DIAGNOSIS — E78.5 HYPERLIPIDEMIA, UNSPECIFIED HYPERLIPIDEMIA TYPE: Chronic | ICD-10-CM

## 2022-02-21 DIAGNOSIS — Z79.899 ENCOUNTER FOR LONG-TERM (CURRENT) USE OF MEDICATIONS: ICD-10-CM

## 2022-02-21 DIAGNOSIS — E55.9 VITAMIN D DEFICIENCY: ICD-10-CM

## 2022-02-21 LAB
25(OH)D3 SERPL-MCNC: 27.3 NG/ML (ref 30–100)
ALBUMIN SERPL-MCNC: 4.1 G/DL (ref 3.5–5.2)
ALBUMIN/GLOB SERPL: 1.3 G/DL
ALP SERPL-CCNC: 62 U/L (ref 39–117)
ALT SERPL W P-5'-P-CCNC: 19 U/L (ref 1–41)
ANION GAP SERPL CALCULATED.3IONS-SCNC: 13.1 MMOL/L (ref 5–15)
AST SERPL-CCNC: 18 U/L (ref 1–40)
BASOPHILS # BLD AUTO: 0.02 10*3/MM3 (ref 0–0.2)
BASOPHILS NFR BLD AUTO: 0.3 % (ref 0–1.5)
BILIRUB SERPL-MCNC: 0.4 MG/DL (ref 0–1.2)
BUN SERPL-MCNC: 19 MG/DL (ref 8–23)
BUN/CREAT SERPL: 10.9 (ref 7–25)
CALCIUM SPEC-SCNC: 9.3 MG/DL (ref 8.6–10.5)
CHLORIDE SERPL-SCNC: 100 MMOL/L (ref 98–107)
CHOLEST SERPL-MCNC: 137 MG/DL (ref 0–200)
CO2 SERPL-SCNC: 24.9 MMOL/L (ref 22–29)
CREAT SERPL-MCNC: 1.74 MG/DL (ref 0.76–1.27)
DEPRECATED RDW RBC AUTO: 39.1 FL (ref 37–54)
EOSINOPHIL # BLD AUTO: 0.12 10*3/MM3 (ref 0–0.4)
EOSINOPHIL NFR BLD AUTO: 2 % (ref 0.3–6.2)
ERYTHROCYTE [DISTWIDTH] IN BLOOD BY AUTOMATED COUNT: 13.1 % (ref 12.3–15.4)
GFR SERPL CREATININE-BSD FRML MDRD: 38 ML/MIN/1.73
GLOBULIN UR ELPH-MCNC: 3.2 GM/DL
GLUCOSE SERPL-MCNC: 108 MG/DL (ref 65–99)
HCT VFR BLD AUTO: 38.7 % (ref 37.5–51)
HDLC SERPL-MCNC: 57 MG/DL (ref 40–60)
HGB BLD-MCNC: 13.2 G/DL (ref 13–17.7)
IMM GRANULOCYTES # BLD AUTO: 0.02 10*3/MM3 (ref 0–0.05)
IMM GRANULOCYTES NFR BLD AUTO: 0.3 % (ref 0–0.5)
LDLC SERPL CALC-MCNC: 63 MG/DL (ref 0–100)
LDLC/HDLC SERPL: 1.09 {RATIO}
LYMPHOCYTES # BLD AUTO: 1.27 10*3/MM3 (ref 0.7–3.1)
LYMPHOCYTES NFR BLD AUTO: 20.9 % (ref 19.6–45.3)
MCH RBC QN AUTO: 28.5 PG (ref 26.6–33)
MCHC RBC AUTO-ENTMCNC: 34.1 G/DL (ref 31.5–35.7)
MCV RBC AUTO: 83.6 FL (ref 79–97)
MONOCYTES # BLD AUTO: 0.66 10*3/MM3 (ref 0.1–0.9)
MONOCYTES NFR BLD AUTO: 10.9 % (ref 5–12)
NEUTROPHILS NFR BLD AUTO: 3.99 10*3/MM3 (ref 1.7–7)
NEUTROPHILS NFR BLD AUTO: 65.6 % (ref 42.7–76)
NRBC BLD AUTO-RTO: 0 /100 WBC (ref 0–0.2)
PLATELET # BLD AUTO: 257 10*3/MM3 (ref 140–450)
PMV BLD AUTO: 10.3 FL (ref 6–12)
POTASSIUM SERPL-SCNC: 4.1 MMOL/L (ref 3.5–5.2)
PROT SERPL-MCNC: 7.3 G/DL (ref 6–8.5)
PSA SERPL-MCNC: 3.36 NG/ML (ref 0–4)
RBC # BLD AUTO: 4.63 10*6/MM3 (ref 4.14–5.8)
SODIUM SERPL-SCNC: 138 MMOL/L (ref 136–145)
T4 FREE SERPL-MCNC: 1.08 NG/DL (ref 0.93–1.7)
TRIGL SERPL-MCNC: 89 MG/DL (ref 0–150)
TSH SERPL DL<=0.05 MIU/L-ACNC: 3.38 UIU/ML (ref 0.27–4.2)
VLDLC SERPL-MCNC: 17 MG/DL (ref 5–40)
WBC NRBC COR # BLD: 6.08 10*3/MM3 (ref 3.4–10.8)

## 2022-02-21 PROCEDURE — 80307 DRUG TEST PRSMV CHEM ANLYZR: CPT

## 2022-02-21 PROCEDURE — 84439 ASSAY OF FREE THYROXINE: CPT

## 2022-02-21 PROCEDURE — 82306 VITAMIN D 25 HYDROXY: CPT

## 2022-02-21 PROCEDURE — 80061 LIPID PANEL: CPT

## 2022-02-21 PROCEDURE — 84153 ASSAY OF PSA TOTAL: CPT

## 2022-02-21 PROCEDURE — 36415 COLL VENOUS BLD VENIPUNCTURE: CPT

## 2022-02-21 PROCEDURE — 85025 COMPLETE CBC W/AUTO DIFF WBC: CPT

## 2022-02-21 PROCEDURE — 84443 ASSAY THYROID STIM HORMONE: CPT

## 2022-02-21 PROCEDURE — 80053 COMPREHEN METABOLIC PANEL: CPT

## 2022-02-21 RX ORDER — RANOLAZINE 500 MG/1
TABLET, EXTENDED RELEASE ORAL
Qty: 180 TABLET | Refills: 3 | Status: SHIPPED | OUTPATIENT
Start: 2022-02-21 | End: 2022-10-13 | Stop reason: SDUPTHER

## 2022-02-21 RX ORDER — LEVOTHYROXINE SODIUM 25 MCG
25 TABLET ORAL EVERY MORNING
Qty: 90 TABLET | Refills: 0 | Status: SHIPPED | OUTPATIENT
Start: 2022-02-21 | End: 2022-03-04 | Stop reason: SDUPTHER

## 2022-02-22 ENCOUNTER — OFFICE VISIT (OUTPATIENT)
Dept: UROLOGY | Facility: CLINIC | Age: 77
End: 2022-02-22

## 2022-02-22 VITALS — WEIGHT: 185 LBS | HEIGHT: 70 IN | RESPIRATION RATE: 16 BRPM | BODY MASS INDEX: 26.48 KG/M2

## 2022-02-22 DIAGNOSIS — N40.0 BENIGN PROSTATIC HYPERPLASIA, UNSPECIFIED WHETHER LOWER URINARY TRACT SYMPTOMS PRESENT: Primary | ICD-10-CM

## 2022-02-22 DIAGNOSIS — Z12.5 PROSTATE CANCER SCREENING: ICD-10-CM

## 2022-02-22 PROBLEM — I77.9 DISORDER OF ARTERIES AND ARTERIOLES, UNSPECIFIED: Status: ACTIVE | Noted: 2021-09-24

## 2022-02-22 LAB
AMPHET+METHAMPHET UR QL: NEGATIVE
BARBITURATES UR QL SCN: NEGATIVE
BENZODIAZ UR QL SCN: NEGATIVE
CANNABINOIDS SERPL QL: NEGATIVE
COCAINE UR QL: NEGATIVE
METHADONE UR QL SCN: NEGATIVE
OPIATES UR QL: NEGATIVE
OXYCODONE UR QL SCN: NEGATIVE
URINE VOLUME: 0

## 2022-02-22 PROCEDURE — 99214 OFFICE O/P EST MOD 30 MIN: CPT | Performed by: NURSE PRACTITIONER

## 2022-02-22 PROCEDURE — 51798 US URINE CAPACITY MEASURE: CPT | Performed by: NURSE PRACTITIONER

## 2022-02-22 RX ORDER — TADALAFIL 5 MG/1
5 TABLET ORAL DAILY PRN
Qty: 90 TABLET | Refills: 3 | Status: SHIPPED | OUTPATIENT
Start: 2022-02-22 | End: 2022-04-19 | Stop reason: SDUPTHER

## 2022-02-22 NOTE — PROGRESS NOTES
Chief Complaint: Benign Prostatic Hypertrophy (on flomax working well)    Subjective     {Problem List  Visit Diagnosis   Encounters  Notes  Medications  Labs  Result Review Imaging  Media :23}    History of Present Illness  Reji Mcmillan is a 76 y.o. male presents to [facility] to be seen for annual f/u BPH.    Patient had PSA repeated on 2/22/2022 which was 3.3.    Patient states that he is using 0.2ml of the trimix. He has issues with achieving orgasm.     He states that he is having issues with maintaining erection.      He states that nocturia is rare.      He has been on tamsulosin 0.4mg q day for the last 6 months.     His urgency and frequency has improved.     He states taht he would like to try the cialis once again.       Objective     Past Medical History:   Diagnosis Date   • Allergic rhinitis due to allergen    • Chest pain    • Chronic allergic rhinitis    • CKD (chronic kidney disease) stage 3, GFR 30-59 ml/min ([facility])    • Eczema    • Erectile dysfunction 10/19/2020   • GERD (gastroesophageal reflux disease)    • Heart disease    • High blood pressure    • High cholesterol    • Hypercholesteremia    • Hypertension     Controlled   • Hypothyroidism    • Polio    • Prostate disorder        Past Surgical History:   Procedure Laterality Date   • CARDIAC SURGERY     • CHOLECYSTECTOMY     • COLONOSCOPY     • CORONARY ARTERY BYPASS GRAFT      cabbage x2   • HERNIA REPAIR           Current Outpatient Medications:   •  amLODIPine (NORVASC) 10 MG tablet, Take 1 tablet by mouth Daily., Disp: , Rfl:   •  aspirin 81 MG EC tablet, Take 81 mg by mouth Daily., Disp: , Rfl:   •  atorvastatin (LIPITOR) 80 MG tablet, Take 1 tablet by mouth Daily., Disp: 90 tablet, Rfl: 3  •  cetirizine (zyrTEC) 10 MG tablet, Take 10 mg by mouth Daily., Disp: , Rfl:   •  Cholecalciferol 50 MCG (2000 UT) capsule, Take 1 tablet by mouth Daily., Disp: , Rfl:   •  Dupilumab (Dupixent) 300 MG/2ML solution  "pen-injector, Inject 1 mL under the skin into the appropriate area as directed., Disp: , Rfl:   •  lansoprazole (PREVACID) 30 MG capsule, , Disp: , Rfl:   •  losartan-hydrochlorothiazide (HYZAAR) 100-12.5 MG per tablet, TAKE 1 TABLET DAILY, Disp: 90 tablet, Rfl: 2  •  magnesium oxide (MAGOX) 400 (241.3 Mg) MG tablet tablet, Take 400 mg by mouth Daily., Disp: , Rfl:   •  PARoxetine (PAXIL) 10 MG tablet, Take 1 tablet by mouth Every Morning., Disp: 90 tablet, Rfl: 1  •  ranolazine (RANEXA) 500 MG 12 hr tablet, TAKE 1 TABLET TWICE A DAY, Disp: 180 tablet, Rfl: 3  •  Synthroid 25 MCG tablet, Take 1 tablet by mouth Every Morning. Take on empty stomach., Disp: 90 tablet, Rfl: 0  •  zolpidem (Ambien) 5 MG tablet, Take 1 tablet by mouth At Night As Needed for Sleep., Disp: 30 tablet, Rfl: 2  •  tadalafil (CIALIS) 5 MG tablet, Take 1 tablet by mouth Daily As Needed for Erectile Dysfunction., Disp: 90 tablet, Rfl: 3    No Known Allergies     Family History   Problem Relation Age of Onset   • Hyperlipidemia Mother    • Heart disease Mother    • Hypertension Mother    • Hyperlipidemia Father    • Hypertension Father    • Heart attack Father        Social History     Socioeconomic History   • Marital status:    Tobacco Use   • Smoking status: Never Smoker   • Smokeless tobacco: Never Used   Vaping Use   • Vaping Use: Never used   Substance and Sexual Activity   • Alcohol use: Yes     Comment: drinks rarely; beer and liquor   • Drug use: Never   • Sexual activity: Defer       Vital Signs:   Resp 16   Ht 177.8 cm (70\")   Wt 83.9 kg (185 lb)   BMI 26.54 kg/m²      Physical Exam     Result Review :   The following data was reviewed by: CARSON Henry on 02/22/2022:  Results for orders placed or performed in visit on 02/22/22   Bladder Scan   Result Value Ref Range    Urine Volume 0       PSA    PSA 5/21/21 8/12/21 2/21/22   PSA 4.46 (A) 4.150 (A) 3.360   (A) Abnormal value            Bladder Scan interpretation " 02/22/2022    Estimation of residual urine via BVI 3000 [place] Bladder Scan  Residual Urine: 0 ml  Indication: Benign prostatic hyperplasia, unspecified whether lower urinary tract symptoms present    Prostate cancer screening   Position: Supine  Examination: Incremental scanning of the suprapubic area using 2.0 MHz transducer using copious amounts of acoustic gel.   Findings: An anechoic area was demonstrated which represented the bladder, with measurement of residual urine as noted. I inspected this myself. In that the residual urine was insignificant, refer to plan for treatment and plan necessary at this time.         Procedures        Assessment and Plan    Diagnoses and all orders for this visit:    1. Benign prostatic hyperplasia, unspecified whether lower urinary tract symptoms present (Primary)  -     Bladder Scan  -     tadalafil (CIALIS) 5 MG tablet; Take 1 tablet by mouth Daily As Needed for Erectile Dysfunction.  Dispense: 90 tablet; Refill: 3    2. Prostate cancer screening  -     PSA Screen; Future        As patient is still with bothersome complaints of erectile dysfunction and is having anorgasmia with tamsulosin in conjunction with the Trimix injections we discussed possibly seeing if he would like to try the tadalafil once again.  The patient now states that tadalafil was actually very effective however it was very expensive.  We did discuss the use of good Rx coupon and sending this prescription to [facility] with a cash pay option is significantly cheaper than what he had been quoted previously of over thousand dollars for a 90-day supply.  Patient would like to try 5 mg daily Cialis to see if this will help alleviate his BPH as well as ED.      I spent 10 minutes caring for Reji on this date of service. This time includes time spent by me in the following activities:reviewing tests, obtaining and/or reviewing a separately obtained history, performing a medically appropriate examination and/or  evaluation , counseling and educating the patient/family/caregiver, ordering medications, tests, or procedures, and documenting information in the medical record  Follow Up   Return in about 8 weeks (around 4/19/2022) for f/u BPH/ ED .  Patient was given instructions and counseling regarding his condition or for health maintenance advice. Please see specific information pulled into the AVS if appropriate.         This document has been electronically signed by CARSON Henry  February 22, 2022 13:40 EST

## 2022-02-23 ENCOUNTER — LAB (OUTPATIENT)
Dept: LAB | Facility: HOSPITAL | Age: 77
End: 2022-02-23

## 2022-02-23 DIAGNOSIS — N18.30 STAGE 3 CHRONIC KIDNEY DISEASE, UNSPECIFIED WHETHER STAGE 3A OR 3B CKD: ICD-10-CM

## 2022-02-23 DIAGNOSIS — R73.9 ELEVATED BLOOD SUGAR: ICD-10-CM

## 2022-02-23 DIAGNOSIS — I10 ESSENTIAL HYPERTENSION: ICD-10-CM

## 2022-02-23 DIAGNOSIS — R73.09 ABNORMAL BLOOD SUGAR: ICD-10-CM

## 2022-02-23 DIAGNOSIS — E55.9 VITAMIN D DEFICIENCY: ICD-10-CM

## 2022-02-23 DIAGNOSIS — Z13.1 SCREENING FOR DIABETES MELLITUS: ICD-10-CM

## 2022-02-23 DIAGNOSIS — Z12.5 PROSTATE CANCER SCREENING: ICD-10-CM

## 2022-02-23 DIAGNOSIS — E78.5 HYPERLIPIDEMIA, UNSPECIFIED HYPERLIPIDEMIA TYPE: ICD-10-CM

## 2022-02-23 DIAGNOSIS — E78.5 HYPERLIPIDEMIA, UNSPECIFIED HYPERLIPIDEMIA TYPE: Primary | ICD-10-CM

## 2022-02-23 DIAGNOSIS — E03.9 HYPOTHYROIDISM, UNSPECIFIED TYPE: ICD-10-CM

## 2022-02-23 DIAGNOSIS — I77.9 DISORDER OF ARTERIES AND ARTERIOLES, UNSPECIFIED: ICD-10-CM

## 2022-02-23 LAB
25(OH)D3 SERPL-MCNC: 29.7 NG/ML (ref 30–100)
ALBUMIN SERPL-MCNC: 4.2 G/DL (ref 3.5–5.2)
ALBUMIN/GLOB SERPL: 1.4 G/DL
ALP SERPL-CCNC: 57 U/L (ref 39–117)
ALT SERPL W P-5'-P-CCNC: 20 U/L (ref 1–41)
ANION GAP SERPL CALCULATED.3IONS-SCNC: 10.3 MMOL/L (ref 5–15)
AST SERPL-CCNC: 20 U/L (ref 1–40)
BASOPHILS # BLD AUTO: 0.02 10*3/MM3 (ref 0–0.2)
BASOPHILS NFR BLD AUTO: 0.4 % (ref 0–1.5)
BILIRUB SERPL-MCNC: 0.7 MG/DL (ref 0–1.2)
BUN SERPL-MCNC: 16 MG/DL (ref 8–23)
BUN/CREAT SERPL: 9.6 (ref 7–25)
CALCIUM SPEC-SCNC: 9.2 MG/DL (ref 8.6–10.5)
CHLORIDE SERPL-SCNC: 98 MMOL/L (ref 98–107)
CHOLEST SERPL-MCNC: 128 MG/DL (ref 0–200)
CO2 SERPL-SCNC: 28.7 MMOL/L (ref 22–29)
CREAT SERPL-MCNC: 1.66 MG/DL (ref 0.76–1.27)
DEPRECATED RDW RBC AUTO: 37.8 FL (ref 37–54)
EOSINOPHIL # BLD AUTO: 0.1 10*3/MM3 (ref 0–0.4)
EOSINOPHIL NFR BLD AUTO: 2 % (ref 0.3–6.2)
ERYTHROCYTE [DISTWIDTH] IN BLOOD BY AUTOMATED COUNT: 12.8 % (ref 12.3–15.4)
GFR SERPL CREATININE-BSD FRML MDRD: 41 ML/MIN/1.73
GLOBULIN UR ELPH-MCNC: 2.9 GM/DL
GLUCOSE SERPL-MCNC: 103 MG/DL (ref 65–99)
HBA1C MFR BLD: 5.4 % (ref 4.8–5.6)
HCT VFR BLD AUTO: 40.1 % (ref 37.5–51)
HDLC SERPL-MCNC: 61 MG/DL (ref 40–60)
HGB BLD-MCNC: 13.8 G/DL (ref 13–17.7)
IMM GRANULOCYTES # BLD AUTO: 0.02 10*3/MM3 (ref 0–0.05)
IMM GRANULOCYTES NFR BLD AUTO: 0.4 % (ref 0–0.5)
LDLC SERPL CALC-MCNC: 54 MG/DL (ref 0–100)
LDLC/HDLC SERPL: 0.91 {RATIO}
LYMPHOCYTES # BLD AUTO: 0.92 10*3/MM3 (ref 0.7–3.1)
LYMPHOCYTES NFR BLD AUTO: 18 % (ref 19.6–45.3)
MCH RBC QN AUTO: 28.3 PG (ref 26.6–33)
MCHC RBC AUTO-ENTMCNC: 34.4 G/DL (ref 31.5–35.7)
MCV RBC AUTO: 82.3 FL (ref 79–97)
MONOCYTES # BLD AUTO: 0.57 10*3/MM3 (ref 0.1–0.9)
MONOCYTES NFR BLD AUTO: 11.1 % (ref 5–12)
NEUTROPHILS NFR BLD AUTO: 3.49 10*3/MM3 (ref 1.7–7)
NEUTROPHILS NFR BLD AUTO: 68.1 % (ref 42.7–76)
NRBC BLD AUTO-RTO: 0 /100 WBC (ref 0–0.2)
PLATELET # BLD AUTO: 255 10*3/MM3 (ref 140–450)
PMV BLD AUTO: 10.6 FL (ref 6–12)
POTASSIUM SERPL-SCNC: 3.9 MMOL/L (ref 3.5–5.2)
PROT SERPL-MCNC: 7.1 G/DL (ref 6–8.5)
PSA SERPL-MCNC: 3.72 NG/ML (ref 0–4)
RBC # BLD AUTO: 4.87 10*6/MM3 (ref 4.14–5.8)
SODIUM SERPL-SCNC: 137 MMOL/L (ref 136–145)
T4 FREE SERPL-MCNC: 1.13 NG/DL (ref 0.93–1.7)
TRIGL SERPL-MCNC: 58 MG/DL (ref 0–150)
TSH SERPL DL<=0.05 MIU/L-ACNC: 3.59 UIU/ML (ref 0.27–4.2)
VLDLC SERPL-MCNC: 13 MG/DL (ref 5–40)
WBC NRBC COR # BLD: 5.12 10*3/MM3 (ref 3.4–10.8)

## 2022-02-23 PROCEDURE — 83036 HEMOGLOBIN GLYCOSYLATED A1C: CPT

## 2022-02-23 PROCEDURE — 36415 COLL VENOUS BLD VENIPUNCTURE: CPT

## 2022-02-23 PROCEDURE — 80053 COMPREHEN METABOLIC PANEL: CPT

## 2022-02-23 PROCEDURE — 84443 ASSAY THYROID STIM HORMONE: CPT

## 2022-02-23 PROCEDURE — G0103 PSA SCREENING: HCPCS

## 2022-02-23 PROCEDURE — 80061 LIPID PANEL: CPT

## 2022-02-23 PROCEDURE — 82306 VITAMIN D 25 HYDROXY: CPT

## 2022-02-23 PROCEDURE — 85025 COMPLETE CBC W/AUTO DIFF WBC: CPT

## 2022-02-23 PROCEDURE — 84439 ASSAY OF FREE THYROXINE: CPT

## 2022-02-28 ENCOUNTER — OFFICE VISIT (OUTPATIENT)
Dept: INTERNAL MEDICINE | Facility: CLINIC | Age: 77
End: 2022-02-28

## 2022-02-28 ENCOUNTER — LAB (OUTPATIENT)
Dept: LAB | Facility: HOSPITAL | Age: 77
End: 2022-02-28

## 2022-02-28 VITALS
SYSTOLIC BLOOD PRESSURE: 96 MMHG | HEART RATE: 74 BPM | OXYGEN SATURATION: 96 % | BODY MASS INDEX: 26.28 KG/M2 | WEIGHT: 183.6 LBS | TEMPERATURE: 97.5 F | HEIGHT: 70 IN | DIASTOLIC BLOOD PRESSURE: 57 MMHG

## 2022-02-28 DIAGNOSIS — G47.00 INSOMNIA, UNSPECIFIED TYPE: Chronic | ICD-10-CM

## 2022-02-28 DIAGNOSIS — K59.00 CONSTIPATION, UNSPECIFIED CONSTIPATION TYPE: ICD-10-CM

## 2022-02-28 DIAGNOSIS — J06.9 ACUTE URI: ICD-10-CM

## 2022-02-28 DIAGNOSIS — E55.9 VITAMIN D DEFICIENCY: ICD-10-CM

## 2022-02-28 DIAGNOSIS — I10 ESSENTIAL HYPERTENSION: ICD-10-CM

## 2022-02-28 DIAGNOSIS — F41.9 ANXIETY AND DEPRESSION: ICD-10-CM

## 2022-02-28 DIAGNOSIS — Z11.59 NEED FOR HEPATITIS C SCREENING TEST: ICD-10-CM

## 2022-02-28 DIAGNOSIS — E03.9 HYPOTHYROIDISM, UNSPECIFIED TYPE: ICD-10-CM

## 2022-02-28 DIAGNOSIS — F32.A ANXIETY AND DEPRESSION: ICD-10-CM

## 2022-02-28 LAB
EXPIRATION DATE: NORMAL
FLUAV AG NPH QL: NEGATIVE
FLUBV AG NPH QL: NEGATIVE
INTERNAL CONTROL: NORMAL
Lab: NORMAL

## 2022-02-28 PROCEDURE — U0004 COV-19 TEST NON-CDC HGH THRU: HCPCS

## 2022-02-28 PROCEDURE — 99215 OFFICE O/P EST HI 40 MIN: CPT | Performed by: NURSE PRACTITIONER

## 2022-02-28 PROCEDURE — 87804 INFLUENZA ASSAY W/OPTIC: CPT | Performed by: NURSE PRACTITIONER

## 2022-02-28 RX ORDER — ZOLPIDEM TARTRATE 12.5 MG/1
12.5 TABLET, FILM COATED, EXTENDED RELEASE ORAL NIGHTLY PRN
COMMUNITY
End: 2022-04-11 | Stop reason: SDUPTHER

## 2022-02-28 RX ORDER — ZOLPIDEM TARTRATE 12.5 MG/1
12.5 TABLET, FILM COATED, EXTENDED RELEASE ORAL NIGHTLY PRN
Qty: 90 TABLET | Refills: 0 | Status: CANCELLED | OUTPATIENT
Start: 2022-02-28

## 2022-02-28 RX ORDER — AMLODIPINE BESYLATE 10 MG/1
10 TABLET ORAL EVERY 24 HOURS
Qty: 90 TABLET | Refills: 1 | Status: SHIPPED | OUTPATIENT
Start: 2022-02-28 | End: 2022-08-29

## 2022-02-28 RX ORDER — PAROXETINE 10 MG/1
10 TABLET, FILM COATED ORAL EVERY MORNING
Qty: 90 TABLET | Refills: 1 | Status: SHIPPED | OUTPATIENT
Start: 2022-02-28 | End: 2022-08-11

## 2022-03-01 LAB — SARS-COV-2 RNA PNL SPEC NAA+PROBE: NOT DETECTED

## 2022-03-04 DIAGNOSIS — E03.9 HYPOTHYROIDISM, UNSPECIFIED TYPE: Primary | ICD-10-CM

## 2022-03-04 RX ORDER — LEVOTHYROXINE SODIUM 25 MCG
25 TABLET ORAL EVERY MORNING
Qty: 90 TABLET | Refills: 0 | Status: SHIPPED | OUTPATIENT
Start: 2022-03-04 | End: 2022-03-08

## 2022-03-07 ENCOUNTER — TELEPHONE (OUTPATIENT)
Dept: INTERNAL MEDICINE | Facility: CLINIC | Age: 77
End: 2022-03-07

## 2022-03-07 NOTE — TELEPHONE ENCOUNTER
Pharmacy Name:  [facility] - [place], [place] - [address] - [phone]  - [phone]   [phone]    Pharmacy representative name: LANDEN    Pharmacy representative phone number: [phone]    What medication are you calling in regards to:    Synthroid 25 MCG tablet     (REFERENCE #[id number])    What question does the pharmacy have:PHARMACY SAID MEDICATION IS NOT COVERED BY INSURANCE(SYNTHROID) AND WANTED TO KNOW IF PATIENT COULD SWITCH TO L-THYROXINE (    Who is the provider that prescribed the medication:CARSON DING    Additional notes: PHARMACY REQUESTS A CALL BACK PLEASE

## 2022-03-08 ENCOUNTER — TELEPHONE (OUTPATIENT)
Dept: INTERNAL MEDICINE | Facility: CLINIC | Age: 77
End: 2022-03-08

## 2022-03-08 DIAGNOSIS — E03.9 HYPOTHYROIDISM, UNSPECIFIED TYPE: ICD-10-CM

## 2022-03-08 RX ORDER — LEVOTHYROXINE SODIUM 0.03 MG/1
25 TABLET ORAL DAILY
Qty: 90 TABLET | Refills: 1 | Status: SHIPPED | OUTPATIENT
Start: 2022-03-08 | End: 2022-03-11 | Stop reason: SDUPTHER

## 2022-03-08 NOTE — TELEPHONE ENCOUNTER
Called patient at home and on cell and left 2 voicemails. Trying to get a hold of patient about his synthroid medication. Wanting to know why he has to have Synthroid and not the generic levothyroxine. His insurance doesn't except synthroid so I need to finish a PA on it if he has to have that one I need to know why.

## 2022-03-08 NOTE — TELEPHONE ENCOUNTER
Spoke with patient, generic synthroid is ok for him to take. I sent in new prescription for him that allows levothyroxine and have discontinued Synthroid KEDAR out of chart.

## 2022-03-08 NOTE — TELEPHONE ENCOUNTER
Perscription does not have to be synthroid, can be generic. Per coverage by insurance. Discontinued other medication, resending in as levothyroxine.

## 2022-03-11 ENCOUNTER — TRANSCRIBE ORDERS (OUTPATIENT)
Dept: LAB | Facility: HOSPITAL | Age: 77
End: 2022-03-11

## 2022-03-11 ENCOUNTER — LAB (OUTPATIENT)
Dept: LAB | Facility: HOSPITAL | Age: 77
End: 2022-03-11

## 2022-03-11 DIAGNOSIS — N18.30 STAGE 3 CHRONIC KIDNEY DISEASE, UNSPECIFIED WHETHER STAGE 3A OR 3B CKD: Primary | ICD-10-CM

## 2022-03-11 DIAGNOSIS — E03.9 HYPOTHYROIDISM, UNSPECIFIED TYPE: ICD-10-CM

## 2022-03-11 DIAGNOSIS — N18.30 STAGE 3 CHRONIC KIDNEY DISEASE, UNSPECIFIED WHETHER STAGE 3A OR 3B CKD: ICD-10-CM

## 2022-03-11 LAB
25(OH)D3 SERPL-MCNC: 28.5 NG/ML (ref 30–100)
ALBUMIN SERPL-MCNC: 4 G/DL (ref 3.5–5.2)
ANION GAP SERPL CALCULATED.3IONS-SCNC: 11.7 MMOL/L (ref 5–15)
BACTERIA UR QL AUTO: ABNORMAL /HPF
BASOPHILS # BLD AUTO: 0.02 10*3/MM3 (ref 0–0.2)
BASOPHILS NFR BLD AUTO: 0.3 % (ref 0–1.5)
BILIRUB UR QL STRIP: ABNORMAL
BUN SERPL-MCNC: 17 MG/DL (ref 8–23)
BUN/CREAT SERPL: 11.6 (ref 7–25)
CALCIUM SPEC-SCNC: 8.8 MG/DL (ref 8.6–10.5)
CHLORIDE SERPL-SCNC: 102 MMOL/L (ref 98–107)
CLARITY UR: CLEAR
CO2 SERPL-SCNC: 26.3 MMOL/L (ref 22–29)
COLOR UR: ABNORMAL
CREAT SERPL-MCNC: 1.47 MG/DL (ref 0.76–1.27)
CREAT UR-MCNC: 289.1 MG/DL
DEPRECATED RDW RBC AUTO: 37.9 FL (ref 37–54)
EGFRCR SERPLBLD CKD-EPI 2021: 49.4 ML/MIN/1.73
EOSINOPHIL # BLD AUTO: 0.11 10*3/MM3 (ref 0–0.4)
EOSINOPHIL NFR BLD AUTO: 1.5 % (ref 0.3–6.2)
ERYTHROCYTE [DISTWIDTH] IN BLOOD BY AUTOMATED COUNT: 12.8 % (ref 12.3–15.4)
GLUCOSE SERPL-MCNC: 85 MG/DL (ref 65–99)
GLUCOSE UR STRIP-MCNC: NEGATIVE MG/DL
HCT VFR BLD AUTO: 37.4 % (ref 37.5–51)
HGB BLD-MCNC: 13 G/DL (ref 13–17.7)
HGB UR QL STRIP.AUTO: NEGATIVE
HYALINE CASTS UR QL AUTO: ABNORMAL /LPF
IMM GRANULOCYTES # BLD AUTO: 0.04 10*3/MM3 (ref 0–0.05)
IMM GRANULOCYTES NFR BLD AUTO: 0.5 % (ref 0–0.5)
KETONES UR QL STRIP: ABNORMAL
LEUKOCYTE ESTERASE UR QL STRIP.AUTO: NEGATIVE
LYMPHOCYTES # BLD AUTO: 1.23 10*3/MM3 (ref 0.7–3.1)
LYMPHOCYTES NFR BLD AUTO: 16.8 % (ref 19.6–45.3)
MCH RBC QN AUTO: 28.6 PG (ref 26.6–33)
MCHC RBC AUTO-ENTMCNC: 34.8 G/DL (ref 31.5–35.7)
MCV RBC AUTO: 82.4 FL (ref 79–97)
MONOCYTES # BLD AUTO: 0.8 10*3/MM3 (ref 0.1–0.9)
MONOCYTES NFR BLD AUTO: 10.9 % (ref 5–12)
NEUTROPHILS NFR BLD AUTO: 5.12 10*3/MM3 (ref 1.7–7)
NEUTROPHILS NFR BLD AUTO: 70 % (ref 42.7–76)
NITRITE UR QL STRIP: NEGATIVE
NRBC BLD AUTO-RTO: 0 /100 WBC (ref 0–0.2)
PH UR STRIP.AUTO: 5.5 [PH] (ref 5–8)
PHOSPHATE SERPL-MCNC: 3.2 MG/DL (ref 2.5–4.5)
PLATELET # BLD AUTO: 299 10*3/MM3 (ref 140–450)
PMV BLD AUTO: 10.4 FL (ref 6–12)
POTASSIUM SERPL-SCNC: 3.9 MMOL/L (ref 3.5–5.2)
PROT ?TM UR-MCNC: 32 MG/DL
PROT UR QL STRIP: ABNORMAL
PROT/CREAT UR: 0.11 MG/G{CREAT}
PTH-INTACT SERPL-MCNC: 43.9 PG/ML (ref 15–65)
RBC # BLD AUTO: 4.54 10*6/MM3 (ref 4.14–5.8)
RBC # UR STRIP: ABNORMAL /HPF
REF LAB TEST METHOD: ABNORMAL
SODIUM SERPL-SCNC: 140 MMOL/L (ref 136–145)
SP GR UR STRIP: >=1.03 (ref 1–1.03)
SQUAMOUS #/AREA URNS HPF: ABNORMAL /HPF
UROBILINOGEN UR QL STRIP: ABNORMAL
WBC # UR STRIP: ABNORMAL /HPF
WBC NRBC COR # BLD: 7.32 10*3/MM3 (ref 3.4–10.8)

## 2022-03-11 PROCEDURE — 81001 URINALYSIS AUTO W/SCOPE: CPT

## 2022-03-11 PROCEDURE — 82570 ASSAY OF URINE CREATININE: CPT

## 2022-03-11 PROCEDURE — 83970 ASSAY OF PARATHORMONE: CPT

## 2022-03-11 PROCEDURE — 36415 COLL VENOUS BLD VENIPUNCTURE: CPT

## 2022-03-11 PROCEDURE — 82306 VITAMIN D 25 HYDROXY: CPT

## 2022-03-11 PROCEDURE — 84156 ASSAY OF PROTEIN URINE: CPT

## 2022-03-11 PROCEDURE — 80069 RENAL FUNCTION PANEL: CPT

## 2022-03-11 PROCEDURE — 85025 COMPLETE CBC W/AUTO DIFF WBC: CPT

## 2022-03-11 RX ORDER — LEVOTHYROXINE SODIUM 0.03 MG/1
25 TABLET ORAL DAILY
Qty: 90 TABLET | Refills: 1 | Status: SHIPPED | OUTPATIENT
Start: 2022-03-11 | End: 2022-08-22

## 2022-03-11 NOTE — TELEPHONE ENCOUNTER
Caller: Reji Mcmillan    Relationship: Self    Best call back number: [phone]    Requested Prescriptions:   Requested Prescriptions     Pending Prescriptions Disp Refills   • levothyroxine (SYNTHROID, LEVOTHROID) 25 MCG tablet 90 tablet 1     Sig: Take 1 tablet by mouth Daily.        Pharmacy where request should be sent: [facility] - [address] [phone] [facility] [phone]      Additional details provided by patient: PATIENT HAS A WEEK LEFT OF MEDICATION BUT WOULD LIKE TO REQUEST REFILL NOW SINCE [facility] A L[facility] LONG[facility].     HE STATED THAT [facility] TOLD HIM TO HAVE THE OFFICE CALL AND GIVE VERBAL PA FOR MEDICATION     PHONE NUMBER: [phone]    Does the patient have less than a 3 day supply:  [] Yes  [x] No    Lesvia Green Rep   03/11/22 09:09 EST            Left message for the patient to let her know that we got her lab results back today, and that Salma will go over them at her upcoming appointment.

## 2022-03-30 ENCOUNTER — OFFICE VISIT (OUTPATIENT)
Dept: CARDIOLOGY | Facility: CLINIC | Age: 77
End: 2022-03-30

## 2022-03-30 VITALS
WEIGHT: 181 LBS | BODY MASS INDEX: 25.91 KG/M2 | HEIGHT: 70 IN | HEART RATE: 56 BPM | DIASTOLIC BLOOD PRESSURE: 52 MMHG | SYSTOLIC BLOOD PRESSURE: 119 MMHG

## 2022-03-30 DIAGNOSIS — I25.810 CORONARY ARTERY DISEASE INVOLVING CORONARY BYPASS GRAFT OF NATIVE HEART WITHOUT ANGINA PECTORIS: Primary | ICD-10-CM

## 2022-03-30 DIAGNOSIS — I10 ESSENTIAL HYPERTENSION: ICD-10-CM

## 2022-03-30 DIAGNOSIS — E78.2 MIXED HYPERLIPIDEMIA: ICD-10-CM

## 2022-03-30 PROBLEM — I95.1 ORTHOSTATIC HYPOTENSION: Status: ACTIVE | Noted: 2022-03-17

## 2022-03-30 PROCEDURE — 93000 ELECTROCARDIOGRAM COMPLETE: CPT | Performed by: NURSE PRACTITIONER

## 2022-03-30 PROCEDURE — 99214 OFFICE O/P EST MOD 30 MIN: CPT | Performed by: NURSE PRACTITIONER

## 2022-04-10 NOTE — PROGRESS NOTES
Chief Complaint  Follow-up (Patient would like to ask about next booster if it was necessary. )  Subjective        History of Present Illness  Reji Mcmillan is a 76 y.o. male who presents to [facility] for follow-up of hypertension and insomnia.  Since his last visit he saw Dr. Gutiérrez for CKD and he has not made any medication changes.  He saw cardiology and is taking his antihypertensives differently than what is in their office note.  He is currently taking amlodipine 10 mg daily and 1 tablet losartan-HCTZ 100-12.5 mg once daily.  Orthostatic hypotension has improved and he is not experiencing any side effects or dizziness. BPs at home 120-130s/60s. He sleeps all night on the Ambein CD; on regular Ambein he wakes up at 3 am.  He wants to switch back to the CD formula.  The pharmacy was the one who requested the change due to cost.    Past Medical History:   Diagnosis Date   • Allergic rhinitis due to allergen    • CAD s/p CABG 3/30/2022   • Chest pain    • Chronic allergic rhinitis    • CKD (chronic kidney disease) stage 3, GFR 30-59 ml/min (Formerly [facility])    • Eczema    • Erectile dysfunction 10/19/2020   • GERD (gastroesophageal reflux disease)    • Heart disease    • High blood pressure    • High cholesterol    • Hypercholesteremia    • Hypertension     Controlled   • Hypothyroidism    • Polio    • Prostate disorder         Past Surgical History:   Procedure Laterality Date   • CARDIAC SURGERY     • CHOLECYSTECTOMY     • COLONOSCOPY     • CORONARY ARTERY BYPASS GRAFT      cabbage x2   • HERNIA REPAIR          No Known Allergies       Current Outpatient Medications:   •  amLODIPine (NORVASC) 10 MG tablet, Take 1 tablet by mouth Daily., Disp: 90 tablet, Rfl: 1  •  aspirin 81 MG EC tablet, Take 81 mg by mouth Daily., Disp: , Rfl:   •  atorvastatin (LIPITOR) 80 MG tablet, Take 1 tablet by mouth Daily., Disp: 90 tablet, Rfl: 3  •  cetirizine (zyrTEC) 10 MG tablet, Take 10 mg by mouth  "Daily., Disp: , Rfl:   •  Cholecalciferol 50 MCG (2000 UT) capsule, Take 1 tablet by mouth Daily., Disp: , Rfl:   •  Dupilumab (Dupixent) 300 MG/2ML solution pen-injector, Inject 1 mL under the skin into the appropriate area as directed., Disp: , Rfl:   •  lansoprazole (PREVACID) 30 MG capsule, , Disp: , Rfl:   •  levothyroxine (SYNTHROID, LEVOTHROID) 25 MCG tablet, Take 1 tablet by mouth Daily., Disp: 90 tablet, Rfl: 1  •  linaclotide (LINZESS) 145 MCG capsule capsule, Take 1 capsule by mouth Every Morning Before Breakfast., Disp: 90 capsule, Rfl: 0  •  losartan-hydrochlorothiazide (HYZAAR) 100-12.5 MG per tablet, TAKE 1 TABLET DAILY, Disp: 90 tablet, Rfl: 2  •  magnesium oxide (MAGOX) 400 (241.3 Mg) MG tablet tablet, Take 400 mg by mouth Daily., Disp: , Rfl:   •  PARoxetine (PAXIL) 10 MG tablet, Take 1 tablet by mouth Every Morning., Disp: 90 tablet, Rfl: 1  •  ranolazine (RANEXA) 500 MG 12 hr tablet, TAKE 1 TABLET TWICE A DAY, Disp: 180 tablet, Rfl: 3  •  tadalafil (CIALIS) 5 MG tablet, Take 1 tablet by mouth Daily As Needed for Erectile Dysfunction., Disp: 90 tablet, Rfl: 3  •  zolpidem CR (AMBIEN CR) 12.5 MG CR tablet, Take 1 tablet by mouth At Night As Needed for Sleep., Disp: 30 tablet, Rfl: 5    Objective   /69 (BP Location: Left arm, Patient Position: Sitting, Cuff Size: Adult)   Pulse 58   Temp 98.7 °F (37.1 °C) (Temporal)   Wt 84.1 kg (185 lb 6.4 oz)   SpO2 98%   BMI 26.60 kg/m²    Estimated body mass index is 26.6 kg/m² as calculated from the following:    Height as of 3/30/22: 177.8 cm (70\").    Weight as of this encounter: 84.1 kg (185 lb 6.4 oz).   Physical Exam  Vitals reviewed.   Constitutional:       General: He is not in acute distress.     Appearance: Normal appearance.   HENT:      Head: Normocephalic and atraumatic.   Pulmonary:      Effort: Pulmonary effort is normal.   Neurological:      General: No focal deficit present.      Mental Status: He is alert.   Psychiatric:         Mood " and Affect: Mood normal.         Behavior: Behavior normal.         Thought Content: Thought content normal.        Result Review :   The following data was reviewed by: CARSON Gleason on   Progress Notes by Varsha Jang APRN (03/30/2022 11:00)       Assessment and Plan    Diagnoses and all orders for this visit:    1. Essential hypertension (Primary)  Comments:  Stable on amlodipine 10 mg daily and losartan-hydrochlorothiazide 100 mg - 12.5 mg 1 tablet daily.  Continue current treatment plan.  Follow-up in 6 months.    2. Insomnia, unspecified type  Comments:  Improved on zolpidem CR 12.5 mg nightly.  REENA reviewed.  Continue current treatment plan.  Follow-up in 6 months.  Orders:  -     zolpidem CR (AMBIEN CR) 12.5 MG CR tablet; Take 1 tablet by mouth At Night As Needed for Sleep.  Dispense: 30 tablet; Refill: 5      Follow Up     Patient was given instructions and counseling regarding his condition or for health maintenance advice. Please see specific information pulled into the AVS if appropriate.   Return in about 6 months (around 10/11/2022) for Recheck.    CARSON Gleason

## 2022-04-11 ENCOUNTER — OFFICE VISIT (OUTPATIENT)
Dept: INTERNAL MEDICINE | Facility: CLINIC | Age: 77
End: 2022-04-11

## 2022-04-11 VITALS
OXYGEN SATURATION: 98 % | SYSTOLIC BLOOD PRESSURE: 133 MMHG | TEMPERATURE: 98.7 F | HEART RATE: 58 BPM | BODY MASS INDEX: 26.6 KG/M2 | WEIGHT: 185.4 LBS | DIASTOLIC BLOOD PRESSURE: 69 MMHG

## 2022-04-11 DIAGNOSIS — G47.00 INSOMNIA, UNSPECIFIED TYPE: ICD-10-CM

## 2022-04-11 DIAGNOSIS — I10 ESSENTIAL HYPERTENSION: Primary | ICD-10-CM

## 2022-04-11 PROCEDURE — 99214 OFFICE O/P EST MOD 30 MIN: CPT | Performed by: NURSE PRACTITIONER

## 2022-04-11 RX ORDER — ZOLPIDEM TARTRATE 12.5 MG/1
12.5 TABLET, FILM COATED, EXTENDED RELEASE ORAL NIGHTLY PRN
Qty: 30 TABLET | Refills: 5 | Status: SHIPPED | OUTPATIENT
Start: 2022-04-11 | End: 2022-10-10 | Stop reason: SDUPTHER

## 2022-04-12 ENCOUNTER — TELEPHONE (OUTPATIENT)
Dept: UROLOGY | Facility: CLINIC | Age: 77
End: 2022-04-12

## 2022-04-14 ENCOUNTER — TELEPHONE (OUTPATIENT)
Dept: INTERNAL MEDICINE | Facility: CLINIC | Age: 77
End: 2022-04-14

## 2022-04-14 NOTE — TELEPHONE ENCOUNTER
[facility] called and the medication you prescribed for this pt is not covered. Zolpidem CR 12.5 mg. They recommend any one of these.      Trazadone 50/100/150/300 mg.    Zolpidem tartrate (without extended release)  5mg or 10 mg.    Ramelteon 8mg.    Please advise.

## 2022-04-19 ENCOUNTER — OFFICE VISIT (OUTPATIENT)
Dept: UROLOGY | Facility: CLINIC | Age: 77
End: 2022-04-19

## 2022-04-19 VITALS — BODY MASS INDEX: 25.94 KG/M2 | WEIGHT: 181.2 LBS | RESPIRATION RATE: 16 BRPM | HEIGHT: 70 IN

## 2022-04-19 DIAGNOSIS — N40.0 BENIGN PROSTATIC HYPERPLASIA WITHOUT LOWER URINARY TRACT SYMPTOMS: Primary | ICD-10-CM

## 2022-04-19 LAB
BILIRUB BLD-MCNC: ABNORMAL MG/DL
CLARITY, POC: CLEAR
COLOR UR: YELLOW
EXPIRATION DATE: ABNORMAL
GLUCOSE UR STRIP-MCNC: NEGATIVE MG/DL
KETONES UR QL: ABNORMAL
LEUKOCYTE EST, POC: NEGATIVE
Lab: ABNORMAL
NITRITE UR-MCNC: NEGATIVE MG/ML
PH UR: 5.5 [PH] (ref 5–8)
PROT UR STRIP-MCNC: ABNORMAL MG/DL
RBC # UR STRIP: NEGATIVE /UL
SP GR UR: 1.03 (ref 1–1.03)
UROBILINOGEN UR QL: NORMAL

## 2022-04-19 PROCEDURE — 99214 OFFICE O/P EST MOD 30 MIN: CPT | Performed by: NURSE PRACTITIONER

## 2022-04-19 PROCEDURE — 81003 URINALYSIS AUTO W/O SCOPE: CPT | Performed by: NURSE PRACTITIONER

## 2022-04-19 RX ORDER — TAMSULOSIN HYDROCHLORIDE 0.4 MG/1
1 CAPSULE ORAL DAILY
Qty: 90 CAPSULE | Refills: 3 | Status: SHIPPED | OUTPATIENT
Start: 2022-04-19 | End: 2022-04-19

## 2022-04-19 RX ORDER — TAMSULOSIN HYDROCHLORIDE 0.4 MG/1
1 CAPSULE ORAL DAILY
Qty: 90 CAPSULE | Refills: 3 | Status: SHIPPED | OUTPATIENT
Start: 2022-04-19

## 2022-04-19 NOTE — PROGRESS NOTES
Chief Complaint: Benign Prostatic Hypertrophy (Was on cialis 5 mg stated that he would go back on flomax)    Subjective         History of Present Illness  Reji Mcmillan is a 76 y.o. male presents to [facility] to be seen for annual f/u BPH.    Patient was started on Tadalafil 5mg at last visit.    He states that he would like to go back on the flomax and trimix.     He has nocturia x 1     Urgency and frequency  Is worse on tadalafil.     Previous:  Patient had PSA repeated on 2/22/2022 which was 3.3.    Patient states that he is using 0.2ml of the trimix. He has issues with achieving orgasm.     He states that he is having issues with maintaining erection.      He states that nocturia is rare.      He has been on tamsulosin 0.4mg q day for the last 6 months.     His urgency and frequency has improved.     He states taht he would like to try the cialis once again.       Objective     Past Medical History:   Diagnosis Date   • Allergic rhinitis due to allergen    • CAD s/p CABG 3/30/2022   • Chest pain    • Chronic allergic rhinitis    • CKD (chronic kidney disease) stage 3, GFR 30-59 ml/min (Formerly [facility])    • Eczema    • Erectile dysfunction 10/19/2020   • GERD (gastroesophageal reflux disease)    • Heart disease    • High blood pressure    • High cholesterol    • Hypercholesteremia    • Hypertension     Controlled   • Hypothyroidism    • Polio    • Prostate disorder        Past Surgical History:   Procedure Laterality Date   • CARDIAC SURGERY     • CHOLECYSTECTOMY     • COLONOSCOPY     • CORONARY ARTERY BYPASS GRAFT      cabbage x2   • HERNIA REPAIR           Current Outpatient Medications:   •  amLODIPine (NORVASC) 10 MG tablet, Take 1 tablet by mouth Daily., Disp: 90 tablet, Rfl: 1  •  aspirin 81 MG EC tablet, Take 81 mg by mouth Daily., Disp: , Rfl:   •  atorvastatin (LIPITOR) 80 MG tablet, Take 1 tablet by mouth Daily., Disp: 90 tablet, Rfl: 3  •  cetirizine (zyrTEC) 10 MG tablet, Take 10  "mg by mouth Daily., Disp: , Rfl:   •  Cholecalciferol 50 MCG (2000 UT) capsule, Take 1 tablet by mouth Daily., Disp: , Rfl:   •  Dupilumab (Dupixent) 300 MG/2ML solution pen-injector, Inject 1 mL under the skin into the appropriate area as directed., Disp: , Rfl:   •  lansoprazole (PREVACID) 30 MG capsule, , Disp: , Rfl:   •  levothyroxine (SYNTHROID, LEVOTHROID) 25 MCG tablet, Take 1 tablet by mouth Daily., Disp: 90 tablet, Rfl: 1  •  linaclotide (LINZESS) 145 MCG capsule capsule, Take 1 capsule by mouth Every Morning Before Breakfast., Disp: 90 capsule, Rfl: 0  •  losartan-hydrochlorothiazide (HYZAAR) 100-12.5 MG per tablet, TAKE 1 TABLET DAILY, Disp: 90 tablet, Rfl: 2  •  magnesium oxide (MAGOX) 400 (241.3 Mg) MG tablet tablet, Take 400 mg by mouth Daily., Disp: , Rfl:   •  PARoxetine (PAXIL) 10 MG tablet, Take 1 tablet by mouth Every Morning., Disp: 90 tablet, Rfl: 1  •  ranolazine (RANEXA) 500 MG 12 hr tablet, TAKE 1 TABLET TWICE A DAY, Disp: 180 tablet, Rfl: 3  •  tamsulosin (FLOMAX) 0.4 MG capsule 24 hr capsule, Take 1 capsule by mouth Daily., Disp: 90 capsule, Rfl: 3  •  zolpidem CR (AMBIEN CR) 12.5 MG CR tablet, Take 1 tablet by mouth At Night As Needed for Sleep., Disp: 30 tablet, Rfl: 5    No Known Allergies     Family History   Problem Relation Age of Onset   • Hyperlipidemia Mother    • Heart disease Mother    • Hypertension Mother    • Hyperlipidemia Father    • Hypertension Father    • Heart attack Father        Social History     Socioeconomic History   • Marital status:    Tobacco Use   • Smoking status: Never Smoker   • Smokeless tobacco: Never Used   Vaping Use   • Vaping Use: Never used   Substance and Sexual Activity   • Alcohol use: Yes     Comment: drinks rarely; beer and liquor   • Drug use: Never   • Sexual activity: Defer       Vital Signs:   Resp 16   Ht 177.8 cm (70\")   Wt 82.2 kg (181 lb 3.2 oz)   BMI 26.00 kg/m²      Physical Exam     Result Review :   The following data was " reviewed by: CARSON Henry on 04/19/2022:  Results for orders placed or performed in visit on 04/19/22   POC Urinalysis Dipstick, Automated    Specimen: Urine   Result Value Ref Range    Color Yellow Yellow, Straw, Dark Yellow, Juliet    Clarity, UA Clear Clear    Specific Gravity  1.030 1.005 - 1.030    pH, Urine 5.5 5.0 - 8.0    Leukocytes Negative Negative    Nitrite, UA Negative Negative    Protein, POC Trace (A) Negative mg/dL    Glucose, UA Negative Negative, 1000 mg/dL (3+) mg/dL    Ketones, UA Trace (A) Negative    Urobilinogen, UA Normal Normal    Bilirubin Small (1+) (A) Negative    Blood, UA Negative Negative    Lot Number [id number]     Expiration Date 2,023/2       PSA    PSA 8/12/21 2/21/22 2/23/22   PSA 4.150 (A) 3.360 3.720   (A) Abnormal value                  Procedures        Assessment and Plan    Diagnoses and all orders for this visit:    1. Benign prostatic hyperplasia without lower urinary tract symptoms (Primary)  -     POC Urinalysis Dipstick, Automated  -     Discontinue: tamsulosin (FLOMAX) 0.4 MG capsule 24 hr capsule; Take 1 capsule by mouth Daily.  Dispense: 90 capsule; Refill: 3  -     tamsulosin (FLOMAX) 0.4 MG capsule 24 hr capsule; Take 1 capsule by mouth Daily.  Dispense: 90 capsule; Refill: 3        We will d/c tadalafil and restart flomax 0.4 mg q day. He will use trimix as needed for ED.      I spent 10 minutes caring for Reji on this date of service. This time includes time spent by me in the following activities:reviewing tests, obtaining and/or reviewing a separately obtained history, performing a medically appropriate examination and/or evaluation , counseling and educating the patient/family/caregiver, ordering medications, tests, or procedures, and documenting information in the medical record  Follow Up   Return in about 6 months (around 10/19/2022) for f/u BPH .  Patient was given instructions and counseling regarding his condition or for health maintenance  advice. Please see specific information pulled into the AVS if appropriate.         This document has been electronically signed by CARSON Henry  April 19, 2022 13:25 EDT

## 2022-04-21 ENCOUNTER — TELEPHONE (OUTPATIENT)
Dept: CARDIOLOGY | Facility: CLINIC | Age: 77
End: 2022-04-21

## 2022-05-24 RX ORDER — LANSOPRAZOLE 30 MG/1
30 CAPSULE, DELAYED RELEASE ORAL DAILY
Qty: 90 CAPSULE | Refills: 1 | Status: SHIPPED | OUTPATIENT
Start: 2022-05-24 | End: 2022-10-10 | Stop reason: SDUPTHER

## 2022-05-24 NOTE — TELEPHONE ENCOUNTER
Caller: Reji Mcmillan    Relationship: Self    Best call back number: [phone]    Requested Prescriptions:   Requested Prescriptions     Pending Prescriptions Disp Refills   • lansoprazole (PREVACID) 30 MG capsule          Pharmacy where request should be sent: [facility] - [address] [phone] [facility] [phone] [facility]     Does the patient have less than a 3 day supply:  [] Yes  [x] No    Lesvia CHAPIN Rep   05/24/22 13:17 EDT

## 2022-08-11 DIAGNOSIS — F41.9 ANXIETY AND DEPRESSION: ICD-10-CM

## 2022-08-11 DIAGNOSIS — F32.A ANXIETY AND DEPRESSION: ICD-10-CM

## 2022-08-11 RX ORDER — PAROXETINE 10 MG/1
TABLET, FILM COATED ORAL
Qty: 90 TABLET | Refills: 3 | Status: SHIPPED | OUTPATIENT
Start: 2022-08-11 | End: 2022-10-10 | Stop reason: SDUPTHER

## 2022-08-22 DIAGNOSIS — E03.9 HYPOTHYROIDISM, UNSPECIFIED TYPE: ICD-10-CM

## 2022-08-22 RX ORDER — LEVOTHYROXINE SODIUM 0.03 MG/1
TABLET ORAL
Qty: 90 TABLET | Refills: 3 | Status: SHIPPED | OUTPATIENT
Start: 2022-08-22 | End: 2022-10-10 | Stop reason: SDUPTHER

## 2022-08-29 DIAGNOSIS — I10 ESSENTIAL HYPERTENSION: ICD-10-CM

## 2022-08-29 RX ORDER — AMLODIPINE BESYLATE 10 MG/1
TABLET ORAL
Qty: 90 TABLET | Refills: 3 | Status: SHIPPED | OUTPATIENT
Start: 2022-08-29 | End: 2022-10-10 | Stop reason: SDUPTHER

## 2022-09-06 RX ORDER — ATORVASTATIN CALCIUM 80 MG/1
TABLET, FILM COATED ORAL
Qty: 90 TABLET | Refills: 0 | Status: SHIPPED | OUTPATIENT
Start: 2022-09-06 | End: 2022-10-13 | Stop reason: SDUPTHER

## 2022-09-14 ENCOUNTER — LAB (OUTPATIENT)
Dept: LAB | Facility: HOSPITAL | Age: 77
End: 2022-09-14

## 2022-09-14 ENCOUNTER — TRANSCRIBE ORDERS (OUTPATIENT)
Dept: LAB | Facility: HOSPITAL | Age: 77
End: 2022-09-14

## 2022-09-14 DIAGNOSIS — N18.32 CHRONIC KIDNEY DISEASE (CKD) STAGE G3B/A1, MODERATELY DECREASED GLOMERULAR FILTRATION RATE (GFR) BETWEEN 30-44 ML/MIN/1.73 SQUARE METER AND ALBUMINURIA CREATININE RATIO LESS THAN 30 MG/G (CMS/H*: Primary | ICD-10-CM

## 2022-09-14 DIAGNOSIS — N18.32 CHRONIC KIDNEY DISEASE (CKD) STAGE G3B/A1, MODERATELY DECREASED GLOMERULAR FILTRATION RATE (GFR) BETWEEN 30-44 ML/MIN/1.73 SQUARE METER AND ALBUMINURIA CREATININE RATIO LESS THAN 30 MG/G (CMS/H*: ICD-10-CM

## 2022-09-14 DIAGNOSIS — Z11.59 NEED FOR HEPATITIS C SCREENING TEST: ICD-10-CM

## 2022-09-14 LAB
25(OH)D3 SERPL-MCNC: 32.4 NG/ML (ref 30–100)
ALBUMIN SERPL-MCNC: 4.1 G/DL (ref 3.5–5.2)
ANION GAP SERPL CALCULATED.3IONS-SCNC: 10 MMOL/L (ref 5–15)
BACTERIA UR QL AUTO: ABNORMAL /HPF
BASOPHILS # BLD AUTO: 0.02 10*3/MM3 (ref 0–0.2)
BASOPHILS NFR BLD AUTO: 0.3 % (ref 0–1.5)
BILIRUB UR QL STRIP: NEGATIVE
BUN SERPL-MCNC: 14 MG/DL (ref 8–23)
BUN/CREAT SERPL: 9.6 (ref 7–25)
CALCIUM SPEC-SCNC: 8.5 MG/DL (ref 8.6–10.5)
CHLORIDE SERPL-SCNC: 102 MMOL/L (ref 98–107)
CLARITY UR: CLEAR
CO2 SERPL-SCNC: 26 MMOL/L (ref 22–29)
COLOR UR: YELLOW
CREAT SERPL-MCNC: 1.46 MG/DL (ref 0.76–1.27)
CREAT UR-MCNC: 149.9 MG/DL
DEPRECATED RDW RBC AUTO: 39.2 FL (ref 37–54)
EGFRCR SERPLBLD CKD-EPI 2021: 49.8 ML/MIN/1.73
EOSINOPHIL # BLD AUTO: 0.14 10*3/MM3 (ref 0–0.4)
EOSINOPHIL NFR BLD AUTO: 2.3 % (ref 0.3–6.2)
ERYTHROCYTE [DISTWIDTH] IN BLOOD BY AUTOMATED COUNT: 13.3 % (ref 12.3–15.4)
GLUCOSE SERPL-MCNC: 100 MG/DL (ref 65–99)
GLUCOSE UR STRIP-MCNC: NEGATIVE MG/DL
HCT VFR BLD AUTO: 34.1 % (ref 37.5–51)
HCV AB SER DONR QL: NORMAL
HGB BLD-MCNC: 11.7 G/DL (ref 13–17.7)
HGB UR QL STRIP.AUTO: NEGATIVE
HYALINE CASTS UR QL AUTO: ABNORMAL /LPF
IMM GRANULOCYTES # BLD AUTO: 0.01 10*3/MM3 (ref 0–0.05)
IMM GRANULOCYTES NFR BLD AUTO: 0.2 % (ref 0–0.5)
KETONES UR QL STRIP: NEGATIVE
LEUKOCYTE ESTERASE UR QL STRIP.AUTO: ABNORMAL
LYMPHOCYTES # BLD AUTO: 1.22 10*3/MM3 (ref 0.7–3.1)
LYMPHOCYTES NFR BLD AUTO: 20.1 % (ref 19.6–45.3)
MCH RBC QN AUTO: 27.9 PG (ref 26.6–33)
MCHC RBC AUTO-ENTMCNC: 34.3 G/DL (ref 31.5–35.7)
MCV RBC AUTO: 81.2 FL (ref 79–97)
MONOCYTES # BLD AUTO: 0.73 10*3/MM3 (ref 0.1–0.9)
MONOCYTES NFR BLD AUTO: 12 % (ref 5–12)
NEUTROPHILS NFR BLD AUTO: 3.96 10*3/MM3 (ref 1.7–7)
NEUTROPHILS NFR BLD AUTO: 65.1 % (ref 42.7–76)
NITRITE UR QL STRIP: NEGATIVE
NRBC BLD AUTO-RTO: 0 /100 WBC (ref 0–0.2)
PH UR STRIP.AUTO: 6 [PH] (ref 5–8)
PHOSPHATE SERPL-MCNC: 3.7 MG/DL (ref 2.5–4.5)
PLATELET # BLD AUTO: 254 10*3/MM3 (ref 140–450)
PMV BLD AUTO: 10.3 FL (ref 6–12)
POTASSIUM SERPL-SCNC: 3.9 MMOL/L (ref 3.5–5.2)
PROT ?TM UR-MCNC: 11.7 MG/DL
PROT UR QL STRIP: NEGATIVE
PROT/CREAT UR: 0.08 MG/G{CREAT}
PTH-INTACT SERPL-MCNC: 41.2 PG/ML (ref 15–65)
RBC # BLD AUTO: 4.2 10*6/MM3 (ref 4.14–5.8)
RBC # UR STRIP: ABNORMAL /HPF
REF LAB TEST METHOD: ABNORMAL
SODIUM SERPL-SCNC: 138 MMOL/L (ref 136–145)
SP GR UR STRIP: 1.02 (ref 1–1.03)
SPERM URNS QL MICRO: ABNORMAL /HPF
SQUAMOUS #/AREA URNS HPF: ABNORMAL /HPF
UROBILINOGEN UR QL STRIP: ABNORMAL
WBC # UR STRIP: ABNORMAL /HPF
WBC NRBC COR # BLD: 6.08 10*3/MM3 (ref 3.4–10.8)

## 2022-09-14 PROCEDURE — 83970 ASSAY OF PARATHORMONE: CPT

## 2022-09-14 PROCEDURE — 86803 HEPATITIS C AB TEST: CPT

## 2022-09-14 PROCEDURE — 80069 RENAL FUNCTION PANEL: CPT

## 2022-09-14 PROCEDURE — 36415 COLL VENOUS BLD VENIPUNCTURE: CPT

## 2022-09-14 PROCEDURE — 84156 ASSAY OF PROTEIN URINE: CPT

## 2022-09-14 PROCEDURE — 82570 ASSAY OF URINE CREATININE: CPT

## 2022-09-14 PROCEDURE — 85025 COMPLETE CBC W/AUTO DIFF WBC: CPT

## 2022-09-14 PROCEDURE — 81001 URINALYSIS AUTO W/SCOPE: CPT

## 2022-09-14 PROCEDURE — 82306 VITAMIN D 25 HYDROXY: CPT

## 2022-09-21 RX ORDER — LOSARTAN POTASSIUM AND HYDROCHLOROTHIAZIDE 12.5; 1 MG/1; MG/1
TABLET ORAL
Qty: 90 TABLET | Refills: 0 | Status: SHIPPED | OUTPATIENT
Start: 2022-09-21 | End: 2022-10-10 | Stop reason: DRUGHIGH

## 2022-10-03 ENCOUNTER — TELEPHONE (OUTPATIENT)
Dept: INTERNAL MEDICINE | Facility: CLINIC | Age: 77
End: 2022-10-03

## 2022-10-03 DIAGNOSIS — R73.09 ABNORMAL BLOOD SUGAR: ICD-10-CM

## 2022-10-03 DIAGNOSIS — I10 ESSENTIAL HYPERTENSION: Primary | ICD-10-CM

## 2022-10-03 DIAGNOSIS — D64.9 ANEMIA, UNSPECIFIED TYPE: ICD-10-CM

## 2022-10-03 DIAGNOSIS — E55.9 VITAMIN D DEFICIENCY: ICD-10-CM

## 2022-10-03 DIAGNOSIS — E03.9 HYPOTHYROIDISM, UNSPECIFIED TYPE: ICD-10-CM

## 2022-10-03 DIAGNOSIS — E78.2 MIXED HYPERLIPIDEMIA: ICD-10-CM

## 2022-10-03 NOTE — TELEPHONE ENCOUNTER
Caller: Reji Mcmillan    Relationship to patient: Self    Best call back number: [phone]    Patient is needing: PATIENT CALLED STATING HE WOULD LIKE TO HAVE HIS BLOOD WORK DONE BEFORE HIS 10.10.22 APPOINTMENT. PATIENT IS ASKING THAT THIS CALLED IN SO HE CAN GET IT DONE. PLEASE CALL TO ADVISE WHEN THIS IS READY.

## 2022-10-05 ENCOUNTER — LAB (OUTPATIENT)
Dept: LAB | Facility: HOSPITAL | Age: 77
End: 2022-10-05

## 2022-10-05 DIAGNOSIS — E03.9 HYPOTHYROIDISM, UNSPECIFIED TYPE: ICD-10-CM

## 2022-10-05 DIAGNOSIS — I10 ESSENTIAL HYPERTENSION: ICD-10-CM

## 2022-10-05 DIAGNOSIS — R73.09 ABNORMAL BLOOD SUGAR: ICD-10-CM

## 2022-10-05 DIAGNOSIS — E78.2 MIXED HYPERLIPIDEMIA: ICD-10-CM

## 2022-10-05 DIAGNOSIS — E55.9 VITAMIN D DEFICIENCY: ICD-10-CM

## 2022-10-05 DIAGNOSIS — D64.9 ANEMIA, UNSPECIFIED TYPE: ICD-10-CM

## 2022-10-05 LAB
25(OH)D3 SERPL-MCNC: 34.5 NG/ML (ref 30–100)
ALBUMIN SERPL-MCNC: 4 G/DL (ref 3.5–5.2)
ALBUMIN/GLOB SERPL: 1.4 G/DL
ALP SERPL-CCNC: 60 U/L (ref 39–117)
ALT SERPL W P-5'-P-CCNC: 10 U/L (ref 1–41)
ANION GAP SERPL CALCULATED.3IONS-SCNC: 9.4 MMOL/L (ref 5–15)
AST SERPL-CCNC: 13 U/L (ref 1–40)
BASOPHILS # BLD AUTO: 0.01 10*3/MM3 (ref 0–0.2)
BASOPHILS NFR BLD AUTO: 0.2 % (ref 0–1.5)
BILIRUB SERPL-MCNC: 0.9 MG/DL (ref 0–1.2)
BUN SERPL-MCNC: 18 MG/DL (ref 8–23)
BUN/CREAT SERPL: 12.6 (ref 7–25)
CALCIUM SPEC-SCNC: 9.1 MG/DL (ref 8.6–10.5)
CHLORIDE SERPL-SCNC: 101 MMOL/L (ref 98–107)
CHOLEST SERPL-MCNC: 118 MG/DL (ref 0–200)
CO2 SERPL-SCNC: 28.6 MMOL/L (ref 22–29)
CREAT SERPL-MCNC: 1.43 MG/DL (ref 0.76–1.27)
DEPRECATED RDW RBC AUTO: 38.5 FL (ref 37–54)
EGFRCR SERPLBLD CKD-EPI 2021: 50.8 ML/MIN/1.73
EOSINOPHIL # BLD AUTO: 0.11 10*3/MM3 (ref 0–0.4)
EOSINOPHIL NFR BLD AUTO: 1.8 % (ref 0.3–6.2)
ERYTHROCYTE [DISTWIDTH] IN BLOOD BY AUTOMATED COUNT: 12.8 % (ref 12.3–15.4)
GLOBULIN UR ELPH-MCNC: 2.9 GM/DL
GLUCOSE SERPL-MCNC: 107 MG/DL (ref 65–99)
HBA1C MFR BLD: 5.4 % (ref 4.8–5.6)
HCT VFR BLD AUTO: 36.8 % (ref 37.5–51)
HDLC SERPL-MCNC: 53 MG/DL (ref 40–60)
HGB BLD-MCNC: 12.8 G/DL (ref 13–17.7)
IMM GRANULOCYTES # BLD AUTO: 0.01 10*3/MM3 (ref 0–0.05)
IMM GRANULOCYTES NFR BLD AUTO: 0.2 % (ref 0–0.5)
IRON 24H UR-MRATE: 32 MCG/DL (ref 59–158)
LDLC SERPL CALC-MCNC: 51 MG/DL (ref 0–100)
LDLC/HDLC SERPL: 0.97 {RATIO}
LYMPHOCYTES # BLD AUTO: 0.97 10*3/MM3 (ref 0.7–3.1)
LYMPHOCYTES NFR BLD AUTO: 16.1 % (ref 19.6–45.3)
MCH RBC QN AUTO: 28.8 PG (ref 26.6–33)
MCHC RBC AUTO-ENTMCNC: 34.8 G/DL (ref 31.5–35.7)
MCV RBC AUTO: 82.7 FL (ref 79–97)
MONOCYTES # BLD AUTO: 0.7 10*3/MM3 (ref 0.1–0.9)
MONOCYTES NFR BLD AUTO: 11.6 % (ref 5–12)
NEUTROPHILS NFR BLD AUTO: 4.23 10*3/MM3 (ref 1.7–7)
NEUTROPHILS NFR BLD AUTO: 70.1 % (ref 42.7–76)
NRBC BLD AUTO-RTO: 0 /100 WBC (ref 0–0.2)
PLATELET # BLD AUTO: 263 10*3/MM3 (ref 140–450)
PMV BLD AUTO: 10.6 FL (ref 6–12)
POTASSIUM SERPL-SCNC: 3.5 MMOL/L (ref 3.5–5.2)
PROT SERPL-MCNC: 6.9 G/DL (ref 6–8.5)
RBC # BLD AUTO: 4.45 10*6/MM3 (ref 4.14–5.8)
SODIUM SERPL-SCNC: 139 MMOL/L (ref 136–145)
T4 FREE SERPL-MCNC: 1.12 NG/DL (ref 0.93–1.7)
TRIGL SERPL-MCNC: 69 MG/DL (ref 0–150)
TSH SERPL DL<=0.05 MIU/L-ACNC: 3.37 UIU/ML (ref 0.27–4.2)
VLDLC SERPL-MCNC: 14 MG/DL (ref 5–40)
WBC NRBC COR # BLD: 6.03 10*3/MM3 (ref 3.4–10.8)

## 2022-10-05 PROCEDURE — 80053 COMPREHEN METABOLIC PANEL: CPT

## 2022-10-05 PROCEDURE — 80061 LIPID PANEL: CPT

## 2022-10-05 PROCEDURE — 36415 COLL VENOUS BLD VENIPUNCTURE: CPT

## 2022-10-05 PROCEDURE — 83540 ASSAY OF IRON: CPT

## 2022-10-05 PROCEDURE — 84443 ASSAY THYROID STIM HORMONE: CPT

## 2022-10-05 PROCEDURE — 83036 HEMOGLOBIN GLYCOSYLATED A1C: CPT

## 2022-10-05 PROCEDURE — 82306 VITAMIN D 25 HYDROXY: CPT

## 2022-10-05 PROCEDURE — 84439 ASSAY OF FREE THYROXINE: CPT

## 2022-10-05 PROCEDURE — 85025 COMPLETE CBC W/AUTO DIFF WBC: CPT

## 2022-10-10 ENCOUNTER — OFFICE VISIT (OUTPATIENT)
Dept: INTERNAL MEDICINE | Facility: CLINIC | Age: 77
End: 2022-10-10

## 2022-10-10 VITALS
DIASTOLIC BLOOD PRESSURE: 50 MMHG | HEIGHT: 70 IN | WEIGHT: 182.4 LBS | OXYGEN SATURATION: 97 % | HEART RATE: 67 BPM | SYSTOLIC BLOOD PRESSURE: 115 MMHG | TEMPERATURE: 97.3 F | BODY MASS INDEX: 26.11 KG/M2

## 2022-10-10 DIAGNOSIS — Z79.899 ENCOUNTER FOR LONG-TERM (CURRENT) USE OF MEDICATIONS: ICD-10-CM

## 2022-10-10 DIAGNOSIS — Z23 NEED FOR VACCINATION FOR STREP PNEUMONIAE: ICD-10-CM

## 2022-10-10 DIAGNOSIS — E03.9 HYPOTHYROIDISM, UNSPECIFIED TYPE: Chronic | ICD-10-CM

## 2022-10-10 DIAGNOSIS — F32.A ANXIETY AND DEPRESSION: Chronic | ICD-10-CM

## 2022-10-10 DIAGNOSIS — G47.00 INSOMNIA, UNSPECIFIED TYPE: Primary | Chronic | ICD-10-CM

## 2022-10-10 DIAGNOSIS — Z12.11 ENCOUNTER FOR SCREENING FOR MALIGNANT NEOPLASM OF COLON: ICD-10-CM

## 2022-10-10 DIAGNOSIS — I10 ESSENTIAL HYPERTENSION: Chronic | ICD-10-CM

## 2022-10-10 DIAGNOSIS — F41.9 ANXIETY AND DEPRESSION: Chronic | ICD-10-CM

## 2022-10-10 DIAGNOSIS — Z23 NEED FOR INFLUENZA VACCINATION: ICD-10-CM

## 2022-10-10 DIAGNOSIS — D50.9 IRON DEFICIENCY ANEMIA, UNSPECIFIED IRON DEFICIENCY ANEMIA TYPE: ICD-10-CM

## 2022-10-10 PROCEDURE — G0008 ADMIN INFLUENZA VIRUS VAC: HCPCS | Performed by: NURSE PRACTITIONER

## 2022-10-10 PROCEDURE — 99214 OFFICE O/P EST MOD 30 MIN: CPT | Performed by: NURSE PRACTITIONER

## 2022-10-10 PROCEDURE — 90662 IIV NO PRSV INCREASED AG IM: CPT | Performed by: NURSE PRACTITIONER

## 2022-10-10 PROCEDURE — 90677 PCV20 VACCINE IM: CPT | Performed by: NURSE PRACTITIONER

## 2022-10-10 PROCEDURE — G0009 ADMIN PNEUMOCOCCAL VACCINE: HCPCS | Performed by: NURSE PRACTITIONER

## 2022-10-10 RX ORDER — LANSOPRAZOLE 30 MG/1
30 CAPSULE, DELAYED RELEASE ORAL DAILY
Qty: 90 CAPSULE | Refills: 1 | Status: SHIPPED | OUTPATIENT
Start: 2022-10-10

## 2022-10-10 RX ORDER — LOSARTAN POTASSIUM AND HYDROCHLOROTHIAZIDE 25; 100 MG/1; MG/1
TABLET ORAL
Qty: 45 TABLET | Refills: 3 | Status: SHIPPED | OUTPATIENT
Start: 2022-10-10

## 2022-10-10 RX ORDER — PAROXETINE 10 MG/1
10 TABLET, FILM COATED ORAL EVERY MORNING
Qty: 90 TABLET | Refills: 3 | Status: SHIPPED | OUTPATIENT
Start: 2022-10-10

## 2022-10-10 RX ORDER — LEVOTHYROXINE SODIUM 0.03 MG/1
25 TABLET ORAL DAILY
Qty: 90 TABLET | Refills: 3 | Status: SHIPPED | OUTPATIENT
Start: 2022-10-10

## 2022-10-10 RX ORDER — LOSARTAN POTASSIUM AND HYDROCHLOROTHIAZIDE 12.5; 1 MG/1; MG/1
1 TABLET ORAL DAILY
Qty: 90 TABLET | Refills: 0 | Status: CANCELLED | OUTPATIENT
Start: 2022-10-10

## 2022-10-10 RX ORDER — ATORVASTATIN CALCIUM 80 MG/1
80 TABLET, FILM COATED ORAL DAILY
Qty: 90 TABLET | Refills: 0 | Status: CANCELLED | OUTPATIENT
Start: 2022-10-10

## 2022-10-10 RX ORDER — AMLODIPINE BESYLATE 10 MG/1
10 TABLET ORAL DAILY
Qty: 90 TABLET | Refills: 3 | Status: SHIPPED | OUTPATIENT
Start: 2022-10-10

## 2022-10-10 RX ORDER — RANOLAZINE 500 MG/1
500 TABLET, EXTENDED RELEASE ORAL 2 TIMES DAILY
Qty: 180 TABLET | Refills: 3 | Status: CANCELLED | OUTPATIENT
Start: 2022-10-10

## 2022-10-10 RX ORDER — ZOLPIDEM TARTRATE 12.5 MG/1
12.5 TABLET, FILM COATED, EXTENDED RELEASE ORAL NIGHTLY PRN
Qty: 30 TABLET | Refills: 5 | Status: SHIPPED | OUTPATIENT
Start: 2022-10-10

## 2022-10-10 NOTE — PROGRESS NOTES
Chief Complaint  Hypertension and Follow-up (6 month follow up. The patient states he has been feeling tired. He states once he gets going he does well, but it doesn't last long. Patient is having left shoulder pain as well. The patient would like to discuss vaccines and colonoscopy. )  Subjective        History of Present Illness  Reji Mcmillan is a 77 y.o. male who presents to [facility] for follow-up of insomnia, anxiety, hypothyroidism and hypertension.  BPs at home 115-120s/60s. He sleeps all night on the [employer] CD.  Positive for fatigue that was worse last week and had headache and sinus symptoms.  Negative for chest pain, dizziness,  shortness of air on excertion and heart palpitations. Recent labs reviewed.    Specialist include:  Dr. Gutiérrez for CKD and he has not made any medication changes and Dr. Mata for CAD.     Past Medical History:   Diagnosis Date   • Allergic rhinitis due to allergen    • CAD s/p CABG 3/30/2022   • Chest pain    • Chronic allergic rhinitis    • CKD (chronic kidney disease) stage 3, GFR 30-59 ml/min (Formerly [facility])    • Eczema    • Erectile dysfunction 10/19/2020   • GERD (gastroesophageal reflux disease)    • Heart disease    • High blood pressure    • High cholesterol    • Hypercholesteremia    • Hypertension     Controlled   • Hypothyroidism    • Polio    • Prostate disorder         Past Surgical History:   Procedure Laterality Date   • CARDIAC SURGERY     • CHOLECYSTECTOMY     • COLONOSCOPY     • CORONARY ARTERY BYPASS GRAFT      cabbage x2   • HERNIA REPAIR          No Known Allergies       Current Outpatient Medications:   •  amLODIPine (NORVASC) 10 MG tablet, Take 1 tablet by mouth Daily., Disp: 90 tablet, Rfl: 3  •  aspirin 81 MG EC tablet, Take 81 mg by mouth Daily., Disp: , Rfl:   •  atorvastatin (LIPITOR) 80 MG tablet, TAKE 1 TABLET DAILY, Disp: 90 tablet, Rfl: 0  •  cetirizine (zyrTEC) 10 MG tablet, Take 10 mg by mouth Daily., Disp: , Rfl:   •  " Cholecalciferol 50 MCG (2000 UT) capsule, Take 1 tablet by mouth Daily., Disp: , Rfl:   •  Dupilumab (Dupixent) 300 MG/2ML solution pen-injector, Inject 1 mL under the skin into the appropriate area as directed., Disp: , Rfl:   •  lansoprazole (PREVACID) 30 MG capsule, Take 1 capsule by mouth Daily., Disp: 90 capsule, Rfl: 1  •  levothyroxine (SYNTHROID, LEVOTHROID) 25 MCG tablet, Take 1 tablet by mouth Daily., Disp: 90 tablet, Rfl: 3  •  linaclotide (LINZESS) 145 MCG capsule capsule, Take 1 capsule by mouth Every Morning Before Breakfast., Disp: 90 capsule, Rfl: 0  •  magnesium oxide (MAGOX) 400 (241.3 Mg) MG tablet tablet, Take 400 mg by mouth Daily., Disp: , Rfl:   •  PARoxetine (PAXIL) 10 MG tablet, Take 1 tablet by mouth Every Morning., Disp: 90 tablet, Rfl: 3  •  ranolazine (RANEXA) 500 MG 12 hr tablet, TAKE 1 TABLET TWICE A DAY, Disp: 180 tablet, Rfl: 3  •  tamsulosin (FLOMAX) 0.4 MG capsule 24 hr capsule, Take 1 capsule by mouth Daily., Disp: 90 capsule, Rfl: 3  •  triamcinolone (KENALOG) 0.1 % ointment, , Disp: , Rfl:   •  zolpidem CR (AMBIEN CR) 12.5 MG CR tablet, Take 1 tablet by mouth At Night As Needed for Sleep., Disp: 30 tablet, Rfl: 5  •  losartan-hydrochlorothiazide (Hyzaar) 100-25 MG per tablet, Take 1/2 tab daily., Disp: 45 tablet, Rfl: 3    Objective   /50 (BP Location: Left arm, Patient Position: Sitting, Cuff Size: Large Adult)   Pulse 67   Temp 97.3 °F (36.3 °C) (Temporal)   Ht 177.8 cm (70\")   Wt 82.7 kg (182 lb 6.4 oz)   SpO2 97%   BMI 26.17 kg/m²    Estimated body mass index is 26.17 kg/m² as calculated from the following:    Height as of this encounter: 177.8 cm (70\").    Weight as of this encounter: 82.7 kg (182 lb 6.4 oz).   Physical Exam  Vitals reviewed.   Constitutional:       General: He is not in acute distress.     Appearance: Normal appearance.   HENT:      Head: Normocephalic and atraumatic.   Cardiovascular:      Rate and Rhythm: Normal rate and regular rhythm.      " Heart sounds: Normal heart sounds.   Pulmonary:      Effort: Pulmonary effort is normal.      Breath sounds: Normal breath sounds. No wheezing, rhonchi or rales.   Musculoskeletal:      Right lower leg: No edema.      Left lower leg: No edema.   Lymphadenopathy:      Cervical: No cervical adenopathy.   Neurological:      General: No focal deficit present.      Mental Status: He is alert.   Psychiatric:         Mood and Affect: Mood normal.         Behavior: Behavior normal.         Thought Content: Thought content normal.        Result Review :   The following data was reviewed by: CARSON Gleason   Progress Notes by Varsha Jang APRN (03/30/2022 11:00)    Labs done on 10/5/22.     Assessment and Plan    Diagnoses and all orders for this visit:    1. Insomnia, unspecified type (Primary)  Comments:  Improved on zolpidem CR 12.5 mg nightly.  REENA reviewed.  CS contract done.  Follow-up in 6 months with UDS.  Orders:  -     zolpidem CR (AMBIEN CR) 12.5 MG CR tablet; Take 1 tablet by mouth At Night As Needed for Sleep.  Dispense: 30 tablet; Refill: 5    2. Anxiety and depression  Comments:  Stable on  medication and to continue Paxil 10 mg dialy.    3. Hypothyroidism, unspecified type  Comments:  TSH and T4 normal continue on levothyroxine 25 mcg daily and to continue.  Orders:  -     T4, Free; Future  -     TSH; Future    4. Essential hypertension  Comments:  BP well-controlled on losartan-hctz 100mg/12.5mg 1/2 tab daily and amlodipine 10 mg daily.  Orders:  -     Comprehensive Metabolic Panel; Future    5. Iron deficiency anemia, unspecified iron deficiency anemia type  -     CBC & Differential; Future  -     Iron level; Future    6. Encounter for long-term (current) use of medications  -     Urine Drug Screen - Urine, Clean Catch; Future    7. Need for influenza vaccination  -     Fluzone High-Dose 65+yrs ([id number])    8. Need for vaccination for Strep pneumoniae  -     Pneumococcal Conjugate  Vaccine 20-Valent (PCV20)    9. Encounter for screening for malignant neoplasm of colon  -     Ambulatory Referral For Screening Colonoscopy    Other orders  -     amLODIPine (NORVASC) 10 MG tablet; Take 1 tablet by mouth Daily.  Dispense: 90 tablet; Refill: 3  -     lansoprazole (PREVACID) 30 MG capsule; Take 1 capsule by mouth Daily.  Dispense: 90 capsule; Refill: 1  -     levothyroxine (SYNTHROID, LEVOTHROID) 25 MCG tablet; Take 1 tablet by mouth Daily.  Dispense: 90 tablet; Refill: 3  -     PARoxetine (PAXIL) 10 MG tablet; Take 1 tablet by mouth Every Morning.  Dispense: 90 tablet; Refill: 3  -     losartan-hydrochlorothiazide (Hyzaar) 100-25 MG per tablet; Take 1/2 tab daily.  Dispense: 45 tablet; Refill: 3      Follow Up     Patient was given instructions and counseling regarding his condition or for health maintenance advice. Please see specific information pulled into the AVS if appropriate.   Return in about 6 months (around 4/10/2023) for Medicare Wellness.    CARSON Gleason

## 2022-10-13 ENCOUNTER — OFFICE VISIT (OUTPATIENT)
Dept: CARDIOLOGY | Facility: CLINIC | Age: 77
End: 2022-10-13

## 2022-10-13 VITALS
HEIGHT: 70 IN | WEIGHT: 180 LBS | BODY MASS INDEX: 25.77 KG/M2 | DIASTOLIC BLOOD PRESSURE: 54 MMHG | HEART RATE: 65 BPM | SYSTOLIC BLOOD PRESSURE: 108 MMHG

## 2022-10-13 DIAGNOSIS — I10 ESSENTIAL HYPERTENSION: Primary | ICD-10-CM

## 2022-10-13 DIAGNOSIS — E78.2 MIXED HYPERLIPIDEMIA: ICD-10-CM

## 2022-10-13 DIAGNOSIS — I77.9 DISORDER OF ARTERIES AND ARTERIOLES, UNSPECIFIED: ICD-10-CM

## 2022-10-13 PROCEDURE — 99214 OFFICE O/P EST MOD 30 MIN: CPT | Performed by: INTERNAL MEDICINE

## 2022-10-13 RX ORDER — ATORVASTATIN CALCIUM 80 MG/1
80 TABLET, FILM COATED ORAL DAILY
Qty: 90 TABLET | Refills: 3 | Status: SHIPPED | OUTPATIENT
Start: 2022-10-13

## 2022-10-13 RX ORDER — RANOLAZINE 500 MG/1
500 TABLET, EXTENDED RELEASE ORAL 2 TIMES DAILY
Qty: 180 TABLET | Refills: 3 | Status: SHIPPED | OUTPATIENT
Start: 2022-10-13

## 2022-10-13 NOTE — PROGRESS NOTES
Chief Complaint  Hypertension and Hyperlipidemia    Subjective    Patient with no ongoing anginal discomfort is doing well is under increased amount of stress recently having care for his wife who has dementia  Past Medical History:   Diagnosis Date   • Allergic rhinitis due to allergen    • CAD s/p CABG 3/30/2022   • Chest pain    • Chronic allergic rhinitis    • CKD (chronic kidney disease) stage 3, GFR 30-59 ml/min ([facility])    • Eczema    • Erectile dysfunction 10/19/2020   • GERD (gastroesophageal reflux disease)    • Heart disease    • High blood pressure    • High cholesterol    • Hypercholesteremia    • Hypertension     Controlled   • Hypothyroidism    • Polio    • Prostate disorder          Current Outpatient Medications:   •  amLODIPine (NORVASC) 10 MG tablet, Take 1 tablet by mouth Daily., Disp: 90 tablet, Rfl: 3  •  aspirin 81 MG EC tablet, Take 81 mg by mouth Daily., Disp: , Rfl:   •  atorvastatin (LIPITOR) 80 MG tablet, TAKE 1 TABLET DAILY, Disp: 90 tablet, Rfl: 0  •  cetirizine (zyrTEC) 10 MG tablet, Take 10 mg by mouth Daily., Disp: , Rfl:   •  Cholecalciferol 50 MCG (2000 UT) capsule, Take 1 tablet by mouth Daily., Disp: , Rfl:   •  Dupilumab (Dupixent) 300 MG/2ML solution pen-injector, Inject 1 mL under the skin into the appropriate area as directed., Disp: , Rfl:   •  lansoprazole (PREVACID) 30 MG capsule, Take 1 capsule by mouth Daily., Disp: 90 capsule, Rfl: 1  •  levothyroxine (SYNTHROID, LEVOTHROID) 25 MCG tablet, Take 1 tablet by mouth Daily., Disp: 90 tablet, Rfl: 3  •  linaclotide (LINZESS) 145 MCG capsule capsule, Take 1 capsule by mouth Every Morning Before Breakfast., Disp: 90 capsule, Rfl: 0  •  losartan-hydrochlorothiazide (Hyzaar) 100-25 MG per tablet, Take 1/2 tab daily., Disp: 45 tablet, Rfl: 3  •  magnesium oxide (MAGOX) 400 (241.3 Mg) MG tablet tablet, Take 400 mg by mouth Daily., Disp: , Rfl:   •  PARoxetine (PAXIL) 10 MG tablet, Take 1 tablet by mouth Every Morning., Disp: 90  "tablet, Rfl: 3  •  ranolazine (RANEXA) 500 MG 12 hr tablet, TAKE 1 TABLET TWICE A DAY, Disp: 180 tablet, Rfl: 3  •  tamsulosin (FLOMAX) 0.4 MG capsule 24 hr capsule, Take 1 capsule by mouth Daily., Disp: 90 capsule, Rfl: 3  •  triamcinolone (KENALOG) 0.1 % ointment, , Disp: , Rfl:   •  zolpidem CR (AMBIEN CR) 12.5 MG CR tablet, Take 1 tablet by mouth At Night As Needed for Sleep., Disp: 30 tablet, Rfl: 5    There are no discontinued medications.  No Known Allergies     Social History     Tobacco Use   • Smoking status: Never   • Smokeless tobacco: Never   Vaping Use   • Vaping Use: Never used   Substance Use Topics   • Alcohol use: Yes     Comment: drinks rarely; beer and liquor   • Drug use: Never       Family History   Problem Relation Age of Onset   • Hyperlipidemia Mother    • Heart disease Mother    • Hypertension Mother    • Hyperlipidemia Father    • Hypertension Father    • Heart attack Father         Objective     /54   Pulse 65   Ht 177.8 cm (70\")   Wt 81.6 kg (180 lb)   BMI 25.83 kg/m²       Physical Exam    General Appearance:   · no acute distress  · Alert and oriented x3  HENT:   · lips not cyanotic  · Atraumatic  Neck:  · No jvd   · supple  Respiratory:  · no respiratory distress  · normal breath sounds  · no rales  Cardiovascular:  · Regular rate and rhythm  · no S3, no S4   · no murmur  · no rub  Extremities  · No cyanosis  · lower extremity edema: none    Skin:   · warm, dry  · No rashes      Result Review :     No results found for: PROBNP  CMP    CMP 3/11/22 9/14/22 10/5/22   Glucose 85 100 (A) 107 (A)   BUN 17 14 18   Creatinine 1.47 (A) 1.46 (A) 1.43 (A)   Sodium 140 138 139   Potassium 3.9 3.9 3.5   Chloride 102 102 101   Calcium 8.8 8.5 (A) 9.1   Albumin 4.00 4.10 4.00   Total Bilirubin   0.9   Alkaline Phosphatase   60   AST (SGOT)   13   ALT (SGPT)   10   (A) Abnormal value            CBC w/diff    CBC w/Diff 3/11/22 9/14/22 10/5/22   WBC 7.32 6.08 6.03   RBC 4.54 4.20 4.45 "   Hemoglobin 13.0 11.7 (A) 12.8 (A)   Hematocrit 37.4 (A) 34.1 (A) 36.8 (A)   MCV 82.4 81.2 82.7   MCH 28.6 27.9 28.8   MCHC 34.8 34.3 34.8   RDW 12.8 13.3 12.8   Platelets 299 254 263   Neutrophil Rel % 70.0 65.1 70.1   Immature Granulocyte Rel % 0.5 0.2 0.2   Lymphocyte Rel % 16.8 (A) 20.1 16.1 (A)   Monocyte Rel % 10.9 12.0 11.6   Eosinophil Rel % 1.5 2.3 1.8   Basophil Rel % 0.3 0.3 0.2   (A) Abnormal value             Lab Results   Component Value Date    TSH 3.370 10/05/2022      Lab Results   Component Value Date    FREET4 1.12 10/05/2022      No results found for: DDIMERQUANT  No results found for: MG   No results found for: DIGOXIN   No results found for: TROPONINT        Lipid Panel    Lipid Panel 2/21/22 2/23/22 10/5/22   Total Cholesterol 137 128 118   Triglycerides 89 58 69   HDL Cholesterol 57 61 (A) 53   VLDL Cholesterol 17 13 14   LDL Cholesterol  63 54 51   LDL/HDL Ratio 1.09 0.91 0.97   (A) Abnormal value            No results found for: POCTROP                   Diagnoses and all orders for this visit:    1. Essential hypertension (Primary)    2. Mixed hyperlipidemia    3. Disorder of arteries and arterioles, unspecified (HCC)            Follow Up     No follow-ups on file.          Patient was given instructions and counseling regarding his condition or for health maintenance advice. Please see specific information pulled into the AVS if appropriate.

## 2022-10-19 ENCOUNTER — OFFICE VISIT (OUTPATIENT)
Dept: UROLOGY | Facility: CLINIC | Age: 77
End: 2022-10-19

## 2022-10-19 VITALS
SYSTOLIC BLOOD PRESSURE: 125 MMHG | DIASTOLIC BLOOD PRESSURE: 52 MMHG | HEIGHT: 70 IN | TEMPERATURE: 98 F | HEART RATE: 65 BPM | BODY MASS INDEX: 26.2 KG/M2 | WEIGHT: 183 LBS

## 2022-10-19 DIAGNOSIS — Z87.898 HISTORY OF ELEVATED PSA: ICD-10-CM

## 2022-10-19 DIAGNOSIS — Z12.5 PROSTATE CANCER SCREENING: ICD-10-CM

## 2022-10-19 DIAGNOSIS — N40.0 BENIGN PROSTATIC HYPERPLASIA, UNSPECIFIED WHETHER LOWER URINARY TRACT SYMPTOMS PRESENT: Primary | ICD-10-CM

## 2022-10-19 DIAGNOSIS — N52.9 VASCULOGENIC ERECTILE DYSFUNCTION, UNSPECIFIED VASCULOGENIC ERECTILE DYSFUNCTION TYPE: ICD-10-CM

## 2022-10-19 DIAGNOSIS — N48.6 PEYRONIE DISEASE: ICD-10-CM

## 2022-10-19 DIAGNOSIS — N40.0 ENLARGED PROSTATE ON RECTAL EXAMINATION: ICD-10-CM

## 2022-10-19 LAB
BILIRUB BLD-MCNC: NEGATIVE MG/DL
CLARITY, POC: CLEAR
COLOR UR: YELLOW
GLUCOSE UR STRIP-MCNC: NEGATIVE MG/DL
KETONES UR QL: NEGATIVE
LEUKOCYTE EST, POC: ABNORMAL
NITRITE UR-MCNC: NEGATIVE MG/ML
PH UR: 6 [PH] (ref 5–8)
PROT UR STRIP-MCNC: NEGATIVE MG/DL
PSA SERPL-MCNC: 4.97 NG/ML (ref 0–4)
RBC # UR STRIP: NEGATIVE /UL
SP GR UR: 1.03 (ref 1–1.03)
UROBILINOGEN UR QL: ABNORMAL

## 2022-10-19 PROCEDURE — 81002 URINALYSIS NONAUTO W/O SCOPE: CPT | Performed by: NURSE PRACTITIONER

## 2022-10-19 PROCEDURE — 84153 ASSAY OF PSA TOTAL: CPT | Performed by: NURSE PRACTITIONER

## 2022-10-19 PROCEDURE — 99214 OFFICE O/P EST MOD 30 MIN: CPT | Performed by: NURSE PRACTITIONER

## 2022-10-24 ENCOUNTER — PATIENT ROUNDING (BHMG ONLY) (OUTPATIENT)
Dept: UROLOGY | Facility: CLINIC | Age: 77
End: 2022-10-24

## 2022-10-24 NOTE — PROGRESS NOTES
A Mettl message has been sent to the patient for [place] [facility] [place]

## 2022-11-07 DIAGNOSIS — R97.20 ELEVATED PROSTATE SPECIFIC ANTIGEN (PSA): Primary | ICD-10-CM

## 2022-11-07 DIAGNOSIS — N42.9 PROSTATE IRREGULARITY: ICD-10-CM

## 2022-11-07 RX ORDER — SULFAMETHOXAZOLE AND TRIMETHOPRIM 800; 160 MG/1; MG/1
1 TABLET ORAL 2 TIMES DAILY
Qty: 28 TABLET | Refills: 0 | Status: SHIPPED | OUTPATIENT
Start: 2022-11-07 | End: 2022-11-21

## 2022-11-18 ENCOUNTER — OFFICE VISIT (OUTPATIENT)
Dept: INTERNAL MEDICINE | Facility: CLINIC | Age: 77
End: 2022-11-18

## 2022-11-18 VITALS
BODY MASS INDEX: 25.54 KG/M2 | OXYGEN SATURATION: 99 % | SYSTOLIC BLOOD PRESSURE: 130 MMHG | RESPIRATION RATE: 18 BRPM | TEMPERATURE: 97.2 F | HEART RATE: 72 BPM | DIASTOLIC BLOOD PRESSURE: 56 MMHG | WEIGHT: 178.4 LBS | HEIGHT: 70 IN

## 2022-11-18 DIAGNOSIS — N18.30 STAGE 3 CHRONIC KIDNEY DISEASE, UNSPECIFIED WHETHER STAGE 3A OR 3B CKD: ICD-10-CM

## 2022-11-18 DIAGNOSIS — J06.9 ACUTE URI: Primary | ICD-10-CM

## 2022-11-18 DIAGNOSIS — R51.9 NONINTRACTABLE HEADACHE, UNSPECIFIED CHRONICITY PATTERN, UNSPECIFIED HEADACHE TYPE: ICD-10-CM

## 2022-11-18 DIAGNOSIS — R09.89 RUNNY NOSE: ICD-10-CM

## 2022-11-18 DIAGNOSIS — M79.10 MYALGIA: ICD-10-CM

## 2022-11-18 LAB
ALBUMIN SERPL-MCNC: 3.9 G/DL (ref 3.5–5.2)
ALBUMIN/GLOB SERPL: 1.2 G/DL
ALP SERPL-CCNC: 59 U/L (ref 39–117)
ALT SERPL W P-5'-P-CCNC: 23 U/L (ref 1–41)
ANION GAP SERPL CALCULATED.3IONS-SCNC: 14.4 MMOL/L (ref 5–15)
AST SERPL-CCNC: 23 U/L (ref 1–40)
BASOPHILS # BLD AUTO: 0.01 10*3/MM3 (ref 0–0.2)
BASOPHILS NFR BLD AUTO: 0.2 % (ref 0–1.5)
BILIRUB SERPL-MCNC: 0.5 MG/DL (ref 0–1.2)
BUN SERPL-MCNC: 34 MG/DL (ref 8–23)
BUN/CREAT SERPL: 14.1 (ref 7–25)
CALCIUM SPEC-SCNC: 9.2 MG/DL (ref 8.6–10.5)
CHLORIDE SERPL-SCNC: 93 MMOL/L (ref 98–107)
CO2 SERPL-SCNC: 20.6 MMOL/L (ref 22–29)
CREAT SERPL-MCNC: 2.41 MG/DL (ref 0.76–1.27)
DEPRECATED RDW RBC AUTO: 37.7 FL (ref 37–54)
EGFRCR SERPLBLD CKD-EPI 2021: 27.1 ML/MIN/1.73
EOSINOPHIL # BLD AUTO: 0.17 10*3/MM3 (ref 0–0.4)
EOSINOPHIL NFR BLD AUTO: 2.8 % (ref 0.3–6.2)
ERYTHROCYTE [DISTWIDTH] IN BLOOD BY AUTOMATED COUNT: 13.1 % (ref 12.3–15.4)
EXPIRATION DATE: NORMAL
FLUAV AG UPPER RESP QL IA.RAPID: NOT DETECTED
FLUBV AG UPPER RESP QL IA.RAPID: NOT DETECTED
GLOBULIN UR ELPH-MCNC: 3.2 GM/DL
GLUCOSE SERPL-MCNC: 88 MG/DL (ref 65–99)
HCT VFR BLD AUTO: 35.4 % (ref 37.5–51)
HGB BLD-MCNC: 12.6 G/DL (ref 13–17.7)
IMM GRANULOCYTES # BLD AUTO: 0.02 10*3/MM3 (ref 0–0.05)
IMM GRANULOCYTES NFR BLD AUTO: 0.3 % (ref 0–0.5)
INTERNAL CONTROL: NORMAL
LYMPHOCYTES # BLD AUTO: 0.56 10*3/MM3 (ref 0.7–3.1)
LYMPHOCYTES NFR BLD AUTO: 9.3 % (ref 19.6–45.3)
Lab: NORMAL
MCH RBC QN AUTO: 27.8 PG (ref 26.6–33)
MCHC RBC AUTO-ENTMCNC: 35.6 G/DL (ref 31.5–35.7)
MCV RBC AUTO: 78.1 FL (ref 79–97)
MONOCYTES # BLD AUTO: 0.64 10*3/MM3 (ref 0.1–0.9)
MONOCYTES NFR BLD AUTO: 10.6 % (ref 5–12)
NEUTROPHILS NFR BLD AUTO: 4.63 10*3/MM3 (ref 1.7–7)
NEUTROPHILS NFR BLD AUTO: 76.8 % (ref 42.7–76)
NRBC BLD AUTO-RTO: 0.2 /100 WBC (ref 0–0.2)
PLATELET # BLD AUTO: 240 10*3/MM3 (ref 140–450)
PMV BLD AUTO: 11.2 FL (ref 6–12)
POTASSIUM SERPL-SCNC: 3.8 MMOL/L (ref 3.5–5.2)
PROT SERPL-MCNC: 7.1 G/DL (ref 6–8.5)
RBC # BLD AUTO: 4.53 10*6/MM3 (ref 4.14–5.8)
SARS-COV-2 AG UPPER RESP QL IA.RAPID: NOT DETECTED
SODIUM SERPL-SCNC: 128 MMOL/L (ref 136–145)
WBC NRBC COR # BLD: 6.03 10*3/MM3 (ref 3.4–10.8)

## 2022-11-18 PROCEDURE — 80053 COMPREHEN METABOLIC PANEL: CPT

## 2022-11-18 PROCEDURE — 36415 COLL VENOUS BLD VENIPUNCTURE: CPT

## 2022-11-18 PROCEDURE — 99213 OFFICE O/P EST LOW 20 MIN: CPT

## 2022-11-18 PROCEDURE — 85025 COMPLETE CBC W/AUTO DIFF WBC: CPT

## 2022-11-18 PROCEDURE — 87428 SARSCOV & INF VIR A&B AG IA: CPT

## 2022-11-18 RX ORDER — PREDNISONE 20 MG/1
20 TABLET ORAL 2 TIMES DAILY
Qty: 10 TABLET | Refills: 0 | Status: SHIPPED | OUTPATIENT
Start: 2022-11-18 | End: 2022-11-23

## 2022-11-18 RX ORDER — AZITHROMYCIN 250 MG/1
TABLET, FILM COATED ORAL
Qty: 6 TABLET | Refills: 0 | Status: SHIPPED | OUTPATIENT
Start: 2022-11-18 | End: 2022-12-07

## 2022-11-18 NOTE — ASSESSMENT & PLAN NOTE
Patient complains of headache, myalgias, allergy symptoms chills, mild cough, congestion and fatigue.  Symptoms have been present for over a week.  Patient denies any nausea, vomiting, diarrhea or shortness of breath.  Patient states that he does feel feverish.  COVID and flu negative today in the office.  Patient states he is able to eat.  Due to duration of symptoms, will go ahead and treat with azithromycin and prednisone.  Patient to report any persistent, new or worsening symptoms.

## 2022-11-18 NOTE — PROGRESS NOTES
Chief Complaint  Chills (Pt states that he is having a head ache and feels ache and pain. He states that his nose has been running also and has been very cold feeling and has been going on since last Tuesday. )    History of Present Illness  SUBJECTIVE  Reji Mcmillan presents to [facility] with complaints of headache, myalgias, chills, SOA, congestion, fatigue.   Denies nausea, vomiting, fever, diarrhea.   Symptoms started about a week ago.   Pt has been taking tylenol OTC.    Pt was started on bactrim for elevated PSA and symptoms started after that.     Past Medical History:   Diagnosis Date   • Allergic rhinitis due to allergen    • Benign prostatic hyperplasia    • CAD s/p CABG 03/30/2022   • Chest pain    • Chronic allergic rhinitis    • CKD (chronic kidney disease) stage 3, GFR 30-59 ml/min ([facility])    • Eczema    • Erectile dysfunction 10/19/2020   • GERD (gastroesophageal reflux disease)    • Heart disease    • High blood pressure    • High cholesterol    • Hypercholesteremia    • Hypertension     Controlled   • Hypothyroidism    • Polio    • Prostate disorder       Family History   Problem Relation Age of Onset   • Hyperlipidemia Mother    • Heart disease Mother    • Hypertension Mother    • Hyperlipidemia Father    • Hypertension Father    • Heart attack Father       Past Surgical History:   Procedure Laterality Date   • CARDIAC SURGERY     • CHOLECYSTECTOMY     • COLONOSCOPY     • CORONARY ARTERY BYPASS GRAFT      cabbage x2   • HERNIA REPAIR          Current Outpatient Medications:   •  amLODIPine (NORVASC) 10 MG tablet, Take 1 tablet by mouth Daily., Disp: 90 tablet, Rfl: 3  •  aspirin 81 MG EC tablet, Take 81 mg by mouth Daily., Disp: , Rfl:   •  atorvastatin (LIPITOR) 80 MG tablet, Take 1 tablet by mouth Daily., Disp: 90 tablet, Rfl: 3  •  cetirizine (zyrTEC) 10 MG tablet, Take 10 mg by mouth Daily., Disp: , Rfl:   •  Cholecalciferol 50 MCG (2000 UT) capsule, Take  "1 tablet by mouth Daily., Disp: , Rfl:   •  Dupilumab (Dupixent) 300 MG/2ML solution pen-injector, Inject 1 mL under the skin into the appropriate area as directed., Disp: , Rfl:   •  lansoprazole (PREVACID) 30 MG capsule, Take 1 capsule by mouth Daily., Disp: 90 capsule, Rfl: 1  •  levothyroxine (SYNTHROID, LEVOTHROID) 25 MCG tablet, Take 1 tablet by mouth Daily., Disp: 90 tablet, Rfl: 3  •  linaclotide (LINZESS) 145 MCG capsule capsule, Take 1 capsule by mouth Every Morning Before Breakfast., Disp: 90 capsule, Rfl: 0  •  losartan-hydrochlorothiazide (Hyzaar) 100-25 MG per tablet, Take 1/2 tab daily., Disp: 45 tablet, Rfl: 3  •  magnesium oxide (MAGOX) 400 (241.3 Mg) MG tablet tablet, Take 400 mg by mouth Daily., Disp: , Rfl:   •  PARoxetine (PAXIL) 10 MG tablet, Take 1 tablet by mouth Every Morning., Disp: 90 tablet, Rfl: 3  •  ranolazine (RANEXA) 500 MG 12 hr tablet, Take 1 tablet by mouth 2 (Two) Times a Day., Disp: 180 tablet, Rfl: 3  •  sulfamethoxazole-trimethoprim (Bactrim DS) 800-160 MG per tablet, Take 1 tablet by mouth 2 (Two) Times a Day for 14 days. Advise take with food, Disp: 28 tablet, Rfl: 0  •  tamsulosin (FLOMAX) 0.4 MG capsule 24 hr capsule, Take 1 capsule by mouth Daily., Disp: 90 capsule, Rfl: 3  •  triamcinolone (KENALOG) 0.1 % ointment, , Disp: , Rfl:   •  zolpidem CR (AMBIEN CR) 12.5 MG CR tablet, Take 1 tablet by mouth At Night As Needed for Sleep., Disp: 30 tablet, Rfl: 5  •  azithromycin (Zithromax Z-Corey) 250 MG tablet, Take 2 tablets by mouth on day 1, then 1 tablet daily on days 2-5, Disp: 6 tablet, Rfl: 0  •  predniSONE (DELTASONE) 20 MG tablet, Take 1 tablet by mouth 2 (Two) Times a Day for 5 days., Disp: 10 tablet, Rfl: 0    OBJECTIVE  Vital Signs:   /56 (BP Location: Right arm)   Pulse 72   Temp 97.2 °F (36.2 °C) (Temporal)   Resp 18   Ht 177.8 cm (70\")   Wt 80.9 kg (178 lb 6.4 oz)   SpO2 99%   BMI 25.60 kg/m²    Estimated body mass index is 25.6 kg/m² as calculated " "from the following:    Height as of this encounter: 177.8 cm (70\").    Weight as of this encounter: 80.9 kg (178 lb 6.4 oz).     Wt Readings from Last 3 Encounters:   11/18/22 80.9 kg (178 lb 6.4 oz)   10/19/22 83 kg (183 lb)   10/13/22 81.6 kg (180 lb)     BP Readings from Last 3 Encounters:   11/18/22 130/56   10/19/22 125/52   10/13/22 108/54       Physical Exam  Constitutional:       Appearance: Normal appearance.   HENT:      Head: Normocephalic and atraumatic.      Right Ear: A middle ear effusion is present.      Left Ear: A middle ear effusion is present.   Cardiovascular:      Rate and Rhythm: Normal rate and regular rhythm.   Pulmonary:      Effort: Pulmonary effort is normal. No respiratory distress.      Breath sounds: Normal breath sounds. No stridor. No wheezing, rhonchi or rales.   Chest:      Chest wall: No tenderness.   Abdominal:      General: Bowel sounds are normal.      Palpations: Abdomen is soft.      Tenderness: There is no abdominal tenderness.   Musculoskeletal:         General: Normal range of motion.      Cervical back: Normal range of motion and neck supple.   Skin:     General: Skin is warm and dry.   Neurological:      Mental Status: He is alert and oriented to person, place, and time.   Psychiatric:         Mood and Affect: Mood normal.         Behavior: Behavior normal.         Thought Content: Thought content normal.         Judgment: Judgment normal.          Result Review        No Images in the past 120 days found..     The above data has been reviewed by CARSON Pantoja 11/18/2022 09:38 EST.          Patient Care Team:  Kenzie Mccloud APRN as PCP - General (Nurse Practitioner)      ASSESSMENT & PLAN    Diagnoses and all orders for this visit:    1. Acute URI (Primary)  Assessment & Plan:  Patient complains of headache, myalgias, allergy symptoms chills, mild cough, congestion and fatigue.  Symptoms have been present for over a week.  Patient denies any nausea, vomiting, " diarrhea or shortness of breath.  Patient states that he does feel feverish.  COVID and flu negative today in the office.  Patient states he is able to eat.  Due to duration of symptoms, will go ahead and treat with azithromycin and prednisone.  Patient to report any persistent, new or worsening symptoms.        2. Myalgia  -     CBC w AUTO Differential; Future  -     Comprehensive metabolic panel; Future  -     CBC w AUTO Differential  -     Comprehensive metabolic panel    Other orders  -     azithromycin (Zithromax Z-Corey) 250 MG tablet; Take 2 tablets by mouth on day 1, then 1 tablet daily on days 2-5  Dispense: 6 tablet; Refill: 0  -     predniSONE (DELTASONE) 20 MG tablet; Take 1 tablet by mouth 2 (Two) Times a Day for 5 days.  Dispense: 10 tablet; Refill: 0       Tobacco Use: Low Risk    • Smoking Tobacco Use: Never   • Smokeless Tobacco Use: Never   • Passive Exposure: Not on file       Follow Up     No follow-ups on file.    Please note that portions of this note were completed with a voice recognition program.    Patient was given instructions and counseling regarding his condition or for health maintenance advice. Please see specific information pulled into the AVS if appropriate.   I have reviewed information obtained and documented by others and I have confirmed the accuracy of this documented note.    CARSON Pantoja

## 2022-11-22 ENCOUNTER — LAB (OUTPATIENT)
Dept: INTERNAL MEDICINE | Facility: CLINIC | Age: 77
End: 2022-11-22

## 2022-11-22 ENCOUNTER — LAB (OUTPATIENT)
Dept: UROLOGY | Facility: CLINIC | Age: 77
End: 2022-11-22

## 2022-11-22 DIAGNOSIS — R97.20 ELEVATED PROSTATE SPECIFIC ANTIGEN (PSA): ICD-10-CM

## 2022-11-22 DIAGNOSIS — R97.20 ELEVATED PROSTATE SPECIFIC ANTIGEN (PSA): Primary | ICD-10-CM

## 2022-11-22 DIAGNOSIS — N42.9 PROSTATE IRREGULARITY: ICD-10-CM

## 2022-11-22 LAB — PSA SERPL-MCNC: 16.3 NG/ML (ref 0–4)

## 2022-11-22 PROCEDURE — 84153 ASSAY OF PSA TOTAL: CPT | Performed by: NURSE PRACTITIONER

## 2022-11-22 PROCEDURE — 36415 COLL VENOUS BLD VENIPUNCTURE: CPT | Performed by: NURSE PRACTITIONER

## 2022-11-23 LAB — PSA SERPL-MCNC: 16.7 NG/ML (ref 0–4)

## 2022-12-02 ENCOUNTER — OFFICE VISIT (OUTPATIENT)
Dept: SURGERY | Facility: CLINIC | Age: 77
End: 2022-12-02

## 2022-12-02 ENCOUNTER — PREP FOR SURGERY (OUTPATIENT)
Dept: OTHER | Facility: HOSPITAL | Age: 77
End: 2022-12-02

## 2022-12-02 VITALS — WEIGHT: 180 LBS | HEIGHT: 70 IN | HEART RATE: 55 BPM | BODY MASS INDEX: 25.77 KG/M2

## 2022-12-02 DIAGNOSIS — Z12.11 SCREENING FOR MALIGNANT NEOPLASM OF COLON: Primary | ICD-10-CM

## 2022-12-02 DIAGNOSIS — Z86.010 HISTORY OF COLONIC POLYPS: ICD-10-CM

## 2022-12-02 PROBLEM — Z86.0100 HISTORY OF COLONIC POLYPS: Status: ACTIVE | Noted: 2022-12-02

## 2022-12-02 PROCEDURE — S0260 H&P FOR SURGERY: HCPCS | Performed by: NURSE PRACTITIONER

## 2022-12-02 RX ORDER — POLYETHYLENE GLYCOL 3350 17 G/17G
POWDER, FOR SOLUTION ORAL
Qty: 238 PACKET | Refills: 0 | Status: ON HOLD | OUTPATIENT
Start: 2022-12-02 | End: 2023-03-20

## 2022-12-02 NOTE — PROGRESS NOTES
Chief Complaint: Colonoscopy    Subjective      Colonoscopy consultation       History of Present Illness  Reji Mcmillan is a 77 y.o. male presents to [facility] for colonoscopy consultation.    Patient presents today without complaints for screening colonoscopy.  He denies any abdominal pain, change in bowel habit, or rectal bleeding    Denies any family history of colorectal cancer.    5/19: Colonoscopy ([facility]); rectum - benign colon polyp.    5/05: Colonoscopy ([place]): normal colon.    Objective     Past Medical History:   Diagnosis Date   • Allergic rhinitis due to allergen    • Benign prostatic hyperplasia    • CAD s/p CABG 03/30/2022   • Chest pain    • Chronic allergic rhinitis    • CKD (chronic kidney disease) stage 3, GFR 30-59 ml/min ([facility])    • Eczema    • Erectile dysfunction 10/19/2020   • GERD (gastroesophageal reflux disease)    • Heart disease    • High blood pressure    • High cholesterol    • Hypercholesteremia    • Hypertension     Controlled   • Hypothyroidism    • Polio    • Prostate disorder        Past Surgical History:   Procedure Laterality Date   • CARDIAC SURGERY     • CHOLECYSTECTOMY     • COLONOSCOPY     • CORONARY ARTERY BYPASS GRAFT      cabbage x2   • HERNIA REPAIR         Outpatient Medications Marked as Taking for the 12/2/22 encounter (Office Visit) with [place] Weeks APRN   Medication Sig Dispense Refill   • amLODIPine (NORVASC) 10 MG tablet Take 1 tablet by mouth Daily. 90 tablet 3   • aspirin 81 MG EC tablet Take 81 mg by mouth Daily.     • atorvastatin (LIPITOR) 80 MG tablet Take 1 tablet by mouth Daily. 90 tablet 3   • cetirizine (zyrTEC) 10 MG tablet Take 10 mg by mouth Daily.     • Dupilumab (Dupixent) 300 MG/2ML solution pen-injector Inject 1 mL under the skin into the appropriate area as directed.     • lansoprazole (PREVACID) 30 MG capsule Take 1 capsule by mouth Daily. 90 capsule 1   • levothyroxine (SYNTHROID, LEVOTHROID) 25  "MCG tablet Take 1 tablet by mouth Daily. 90 tablet 3   • losartan-hydrochlorothiazide (Hyzaar) 100-25 MG per tablet Take 1/2 tab daily. 45 tablet 3   • PARoxetine (PAXIL) 10 MG tablet Take 1 tablet by mouth Every Morning. 90 tablet 3   • ranolazine (RANEXA) 500 MG 12 hr tablet Take 1 tablet by mouth 2 (Two) Times a Day. 180 tablet 3   • tamsulosin (FLOMAX) 0.4 MG capsule 24 hr capsule Take 1 capsule by mouth Daily. 90 capsule 3   • triamcinolone (KENALOG) 0.1 % ointment      • zolpidem CR (AMBIEN CR) 12.5 MG CR tablet Take 1 tablet by mouth At Night As Needed for Sleep. 30 tablet 5       No Known Allergies     Family History   Problem Relation Age of Onset   • Hyperlipidemia Mother    • Heart disease Mother    • Hypertension Mother    • Hyperlipidemia Father    • Hypertension Father    • Heart attack Father        Social History     Socioeconomic History   • Marital status:    Tobacco Use   • Smoking status: Never   • Smokeless tobacco: Never   Vaping Use   • Vaping Use: Never used   Substance and Sexual Activity   • Alcohol use: Yes     Comment: drinks rarely; beer and liquor   • Drug use: Never   • Sexual activity: Defer       Review of Systems   Constitutional: Negative for chills and fever.   Gastrointestinal: Negative for abdominal distention, abdominal pain, anal bleeding, blood in stool, constipation, diarrhea and rectal pain.        Vital Signs:   Pulse 55   Ht 177.8 cm (70\")   Wt 81.6 kg (180 lb)   BMI 25.83 kg/m²      Physical Exam  Vitals and nursing note reviewed.   Constitutional:       General: He is not in acute distress.     Appearance: Normal appearance.   HENT:      Head: Normocephalic.   Cardiovascular:      Rate and Rhythm: Normal rate.   Pulmonary:      Effort: Pulmonary effort is normal.   Abdominal:      Palpations: Abdomen is soft.      Tenderness: There is no guarding.   Musculoskeletal:         General: No deformity. Normal range of motion.   Skin:     General: Skin is warm and " dry.      Coloration: Skin is not jaundiced.   Neurological:      General: No focal deficit present.      Mental Status: He is alert and oriented to person, place, and time.   Psychiatric:         Mood and Affect: Mood normal.         Thought Content: Thought content normal.          Result Review :          []  Laboratory  []  Radiology  [x]  Pathology  []  Microbiology  []  EKG/Telemetry   []  Cardiology/Vascular   [x]  Old records  Today I have reviewed Dr. Dc's previous colonoscopy and pathology.  Also reviewed Dr. Eric Soria's colonoscopy.     Assessment and Plan {CC Problem List  Visit Diagnosis  ROS  Review (Popup)  Health Maintenance  Quality  BestPractice  Medications  SmartSets  SnapShot Encounters  Media :23}   Diagnoses and all orders for this visit:    1. Screening for malignant neoplasm of colon (Primary)    2. History of colonic polyps    Other orders  -     polyethylene glycol (MIRALAX) 17 g packet; Take as directed.  Instructions given in office.  Dispense: 238 g bottle  Dispense: 238 packet; Refill: 0        Follow Up   Return for Scheduled colonoscopy with Dr. Dc on 3/20/2023 at [facility].     Phone PAT.  Hospital call with arrival time.    Possible risks/complications, benefits, and alternatives to surgical or invasive procedure have been explained to patient and/or legal guardian.     Patient has been evaluated and can tolerate anesthesia and/or sedation. Risks, benefits, and alternatives to anesthesia and sedation have been explained to patient and/or legal guardian.  Patient verbalizes understanding and is willing to proceed with above plan.     Patient was given instructions and counseling regarding his condition or for health maintenance advice. Please see specific information pulled into the AVS if appropriate.     The office note was dictated with the help of the dragon dictation system.

## 2022-12-07 ENCOUNTER — TELEPHONE (OUTPATIENT)
Dept: INTERNAL MEDICINE | Facility: CLINIC | Age: 77
End: 2022-12-07

## 2022-12-07 ENCOUNTER — OFFICE VISIT (OUTPATIENT)
Dept: UROLOGY | Facility: CLINIC | Age: 77
End: 2022-12-07

## 2022-12-07 VITALS
BODY MASS INDEX: 25.2 KG/M2 | HEART RATE: 69 BPM | SYSTOLIC BLOOD PRESSURE: 127 MMHG | HEIGHT: 71 IN | DIASTOLIC BLOOD PRESSURE: 54 MMHG | WEIGHT: 180 LBS | TEMPERATURE: 97.5 F

## 2022-12-07 DIAGNOSIS — R97.20 ELEVATED PROSTATE SPECIFIC ANTIGEN (PSA): Primary | ICD-10-CM

## 2022-12-07 DIAGNOSIS — N40.0 ENLARGED PROSTATE ON RECTAL EXAMINATION: ICD-10-CM

## 2022-12-07 DIAGNOSIS — N48.6 PEYRONIE DISEASE: ICD-10-CM

## 2022-12-07 LAB
BILIRUB BLD-MCNC: NEGATIVE MG/DL
CLARITY, POC: CLEAR
COLOR UR: YELLOW
GLUCOSE UR STRIP-MCNC: NEGATIVE MG/DL
KETONES UR QL: NEGATIVE
LEUKOCYTE EST, POC: NEGATIVE
NITRITE UR-MCNC: NEGATIVE MG/ML
PH UR: 5 [PH] (ref 5–8)
PROT UR STRIP-MCNC: NEGATIVE MG/DL
RBC # UR STRIP: ABNORMAL /UL
SP GR UR: 1.02 (ref 1–1.03)
UROBILINOGEN UR QL: ABNORMAL

## 2022-12-07 PROCEDURE — 81002 URINALYSIS NONAUTO W/O SCOPE: CPT | Performed by: NURSE PRACTITIONER

## 2022-12-07 PROCEDURE — 99214 OFFICE O/P EST MOD 30 MIN: CPT | Performed by: NURSE PRACTITIONER

## 2022-12-07 RX ORDER — POLYETHYLENE GLYCOL 3350 17 G/17G
POWDER ORAL
COMMUNITY
Start: 2022-12-02 | End: 2022-12-07 | Stop reason: SDUPTHER

## 2022-12-07 NOTE — PROGRESS NOTES
"Chief Complaint  Elevated PSA (11/23/22 psa 16.700)    Subjective          Reji Mcmillan 76 y/o male presents to [facility]  History of Present Illness  Here for follow up of elevated psa level and history of penile curvature. Patient has had elevated psa which has  fluctuated in the past year. Had been Following with Urology [facility].Had seen Dr Milan then had been following with CARSON Blanco.  Initial transfer of care visit 10/19/22, a  psa level was collected and was determined to be elevated 4.97.  Patient on tamsulosin 1 capsule long period of time. Provided with bactrim in November and was to have psa level recollected following antibiotic. He then had some issue upper respiratory problems and provided wiith zithromycin. His psa level was reported as being collected twice on the same day. Here in our office then it was again collected at pcp  11/2/22  16.3 and 16.7. Denies significant voiding problems today. Notices some hesitancy. Still taking tamsulosin. Does not feel need to increase dose.            Contains abnormal data PSA Diagnostic      1 Result Note  Component   Ref Range & Units 2 wk ago   (11/22/22) 2 wk ago   (11/22/22) 1 mo ago   (10/19/22) 9 mo ago   (2/23/22) 9 mo ago   (2/21/22) 1 yr ago   (8/12/21) 1 yr ago   (5/21/21)   PSA   0.000 - 4.000 ng/mL 16.700 High   16.300 High   4.970 High   3.720  3.360  4.150 High   4.46 High  R    Resulting Agency  [facility]  [facility]  [facility]  [facility]                 Objective   Vital Signs:   /54   Pulse 69   Temp 97.5 °F (36.4 °C)   Ht 179.1 cm (70.5\")   Wt 81.6 kg (180 lb)   BMI 25.46 kg/m²     No Known Allergies   Past medical history:  has a past medical history of Allergic rhinitis due to allergen, Benign prostatic hyperplasia, CAD s/p CABG (03/30/2022), Chest pain, Chronic allergic rhinitis, CKD (chronic kidney disease) stage 3, GFR 30-59 ml/min ([facility]), Eczema, Erectile " dysfunction (10/19/2020), GERD (gastroesophageal reflux disease), Heart disease, High blood pressure, High cholesterol, Hypercholesteremia, Hypertension, Hypothyroidism, Polio, and Prostate disorder.   Past surgical history:  has a past surgical history that includes Cardiac surgery; Cholecystectomy; Colonoscopy; Coronary artery bypass graft; and Hernia repair.  Personal history: family history includes Heart attack in his father; Heart disease in his mother; Hyperlipidemia in his father and mother; Hypertension in his father and mother.  Social history:  reports that he has never smoked. He has never used smokeless tobacco. He reports current alcohol use. He reports that he does not use drugs.    Review of Systems   Constitutional: Negative for chills and fever.   Genitourinary: Negative for dysuria, frequency, hematuria and testicular pain.        Physical Exam  Vitals and nursing note reviewed.   Constitutional:       General: He is not in acute distress.     Appearance: Normal appearance. He is well-developed. He is not ill-appearing.      Comments: Ambulates without difficulty   Cardiovascular:      Rate and Rhythm: Normal rate.   Pulmonary:      Effort: Pulmonary effort is normal.      Breath sounds: Normal air entry.   Musculoskeletal:         General: Normal range of motion.   Skin:     General: Skin is warm and dry.   Neurological:      Mental Status: He is alert and oriented to person, place, and time.      Motor: Motor function is intact.   Psychiatric:         Mood and Affect: Mood normal.         Behavior: Behavior normal. Behavior is cooperative.         Thought Content: Thought content normal.         Judgment: Judgment normal.        Result Review :               US Renal Bilateral (01/06/2022 09:07)       Assessment and Plan    Diagnoses and all orders for this visit:    1. Elevated prostate specific antigen (PSA) (Primary)  -     POC Urinalysis Dipstick    2. Enlarged prostate on rectal  examination    3. Peyronie disease      Results for orders placed or performed in visit on 12/07/22   POC Urinalysis Dipstick    Specimen: Urine   Result Value Ref Range    Color Yellow Yellow, Straw, Dark Yellow, Juliet    Clarity, UA Clear Clear    Glucose, UA Negative Negative mg/dL    Bilirubin Negative Negative    Ketones, UA Negative Negative    Specific Gravity  1.025 1.005 - 1.030    Blood, UA Trace (A) Negative    pH, Urine 5.0 5.0 - 8.0    Protein, POC Negative Negative mg/dL    Urobilinogen, UA 0.2 E.U./dL Normal, 0.2 E.U./dL    Leukocytes Negative Negative    Nitrite, UA Negative Negative      Discussed continued elevated psa level now 16.7 acute increase from 4.9 however has had elevated psa's for over 1 year. Will order mri of prostate to evaluate. Initial  FLY prostate significantly enlarged and irregular area noted. Explained that if any suspicious lesions, would refer back to urology [facility] for laser guided fusion biopsy. Even if no worrisome lesions, may need surgical evaluation of enlarged prostate.  To call office after mri completed.  Will Will defer evaluation of peyronie curvature. Patient has trimix at home. Continue tamsulosin as doing. May consider increasing to 2 nightly if having difficulty.         Follow Up   No follow-ups on file.  Patient was given instructions and counseling regarding his condition or for health maintenance advice. Please see specific information pulled into the AVS if appropriate.     CARSON Luna

## 2022-12-07 NOTE — TELEPHONE ENCOUNTER
Caller: Reji Mcmillan    Relationship: Self    Best call back number: [phone]    What test was performed: PSA TEST    When was the test performed: ABOUT TWO WEEKS AGO     Additional notes: PATIENT WOULD LIKE A CALL TO DISCUSS THESE RESULTS.

## 2022-12-08 NOTE — TELEPHONE ENCOUNTER
Spoke with patient. He saw the urologist yesterday. They weren't sure why his PSA increased so much. He is being scheduled for an MRI of the prostate. He doesn't require a call back.

## 2022-12-12 ENCOUNTER — TELEPHONE (OUTPATIENT)
Dept: UROLOGY | Facility: CLINIC | Age: 77
End: 2022-12-12

## 2022-12-12 NOTE — TELEPHONE ENCOUNTER
Provider: DR. MATIAS   Caller: PHIL SEGURA  Relationship to Patient: SELF     Phone Number: [phone]  Reason for Call: PATIENT TRIED CALLING YESTERDAY (Sunday) BUT COULDN'T GET THROUGH. TODAY'S APPOINTMENT HAS BEEN CANCELLED. PATIENT WAITING FOR A CALL TO SCHEDULE AN APPOINTMENT WITH DR. AVILA IN [place] FOR AN MRI FOR PROSTATE AND ONCE THAT IS SCHEDULED HE WILL CALL BACK TO SCHEDULE WITH DR. MATIAS FOR PEYROVISHE'S.

## 2023-01-18 ENCOUNTER — HOSPITAL ENCOUNTER (OUTPATIENT)
Dept: MRI IMAGING | Facility: HOSPITAL | Age: 78
Discharge: HOME | End: 2023-01-18
Payer: MEDICARE

## 2023-01-18 DIAGNOSIS — R97.20 ELEVATED PROSTATE SPECIFIC ANTIGEN (PSA): ICD-10-CM

## 2023-01-18 DIAGNOSIS — N40.0 ENLARGED PROSTATE ON RECTAL EXAMINATION: ICD-10-CM

## 2023-01-26 ENCOUNTER — OFFICE VISIT (OUTPATIENT)
Dept: UROLOGY | Facility: CLINIC | Age: 78
End: 2023-01-26
Payer: MEDICARE

## 2023-01-26 VITALS
HEART RATE: 65 BPM | WEIGHT: 180 LBS | SYSTOLIC BLOOD PRESSURE: 125 MMHG | HEIGHT: 70 IN | DIASTOLIC BLOOD PRESSURE: 59 MMHG | BODY MASS INDEX: 25.77 KG/M2 | TEMPERATURE: 97.8 F

## 2023-01-26 DIAGNOSIS — N40.0 NODULAR HYPERPLASIA OF PROSTATE GLAND: ICD-10-CM

## 2023-01-26 DIAGNOSIS — N52.8 OTHER MALE ERECTILE DYSFUNCTION: ICD-10-CM

## 2023-01-26 DIAGNOSIS — R97.20 ELEVATED PROSTATE SPECIFIC ANTIGEN (PSA): Primary | ICD-10-CM

## 2023-01-26 LAB
BILIRUB BLD-MCNC: NEGATIVE MG/DL
CLARITY, POC: CLEAR
COLOR UR: YELLOW
GLUCOSE UR STRIP-MCNC: NEGATIVE MG/DL
KETONES UR QL: NEGATIVE
LEUKOCYTE EST, POC: NEGATIVE
NITRITE UR-MCNC: NEGATIVE MG/ML
PH UR: 5.5 [PH] (ref 5–8)
PROT UR STRIP-MCNC: NEGATIVE MG/DL
PSA SERPL-MCNC: 5.51 NG/ML (ref 0–4)
RBC # UR STRIP: NEGATIVE /UL
SP GR UR: 1.03 (ref 1–1.03)
UROBILINOGEN UR QL: NORMAL

## 2023-01-26 PROCEDURE — 81002 URINALYSIS NONAUTO W/O SCOPE: CPT | Performed by: NURSE PRACTITIONER

## 2023-01-26 PROCEDURE — 99213 OFFICE O/P EST LOW 20 MIN: CPT | Performed by: NURSE PRACTITIONER

## 2023-01-26 PROCEDURE — 84153 ASSAY OF PSA TOTAL: CPT | Performed by: NURSE PRACTITIONER

## 2023-01-26 NOTE — PROGRESS NOTES
"Chief Complaint  Elevated PSA and Follow-up (Here for MRI prostate results)  Erectile dysfunction with peyronies  Subjective          Reji Mcmillan presents to [facility]  History of Present Illness  Patient here for results of MRI of prostate. Elevated psa for over 1 year. PSA 16.7 11/2022.  MRI indicated markedly enlarged prostate volum 83cc. Median lobe hypertrophy slightly asymetric to the left. PI-RADS 2 . No enlarged lymph nodes identified.     Prostate volume: 83 cc     The prostate transition zone is markedly enlarged with benign prostatic hyperplasia. Significant associated median lobe hypertrophy slightly asymmetric to the left. Diffuse thinning of the peripheral zone.    IMPRESSION: COPY MRI  1.Marked benign prostatic hyperplasia without suspicious lesions per PIRADS criteria.   2.Scattered linear areas of T2 hypointensity throughout the peripheral zone suggesting sequela of prostatitis    Objective   Vital Signs:   /59   Pulse 65   Temp 97.8 °F (36.6 °C) (Infrared)   Ht 177.8 cm (70\")   Wt 81.6 kg (180 lb)   BMI 25.83 kg/m²     No Known Allergies   Past medical history:  has a past medical history of Allergic rhinitis due to allergen, Benign prostatic hyperplasia, CAD s/p CABG (03/30/2022), Chest pain, Chronic allergic rhinitis, CKD (chronic kidney disease) stage 3, GFR 30-59 ml/min ([facility]), Eczema, Erectile dysfunction (10/19/2020), GERD (gastroesophageal reflux disease), Heart disease, High blood pressure, High cholesterol, Hypercholesteremia, Hypertension, Hypothyroidism, Polio, and Prostate disorder.   Past surgical history:  has a past surgical history that includes Cardiac surgery; Cholecystectomy; Colonoscopy; Coronary artery bypass graft; and Hernia repair.  Personal history: family history includes Heart attack in his father; Heart disease in his mother; Hyperlipidemia in his father and mother; Hypertension in his father and mother.  Social history:  " reports that he has never smoked. He has never used smokeless tobacco. He reports current alcohol use. He reports that he does not use drugs.    Review of Systems   Constitutional: Negative for chills and fever.   Genitourinary: Negative for difficulty urinating, dysuria and frequency.        Physical Exam  Vitals and nursing note reviewed.   Constitutional:       General: He is not in acute distress.     Appearance: Normal appearance. He is well-developed. He is not ill-appearing.      Comments: Ambulates without difficulty   Cardiovascular:      Rate and Rhythm: Normal rate.   Pulmonary:      Effort: Pulmonary effort is normal.      Breath sounds: Normal air entry.   Musculoskeletal:         General: Normal range of motion.   Skin:     General: Skin is warm and dry.   Neurological:      Mental Status: He is alert and oriented to person, place, and time.      Motor: Motor function is intact.   Psychiatric:         Mood and Affect: Mood normal.         Behavior: Behavior normal. Behavior is cooperative.         Thought Content: Thought content normal.         Judgment: Judgment normal.        Result Review :   The following data was reviewed by: CARSON Luna on 01/26/2023:  PSA    PSA 2/23/22 10/19/22 11/22/22 11/22/22      1001 1333   PSA 3.720 4.970 (A) 16.300 (A) 16.700 (A)   (A) Abnormal value                      Assessment and Plan    Diagnoses and all orders for this visit:    1. Elevated prostate specific antigen (PSA) (Primary)  -     POC Urinalysis Dipstick    2. Nodular hyperplasia of prostate gland    3. Other male erectile dysfunction      Results for orders placed or performed in visit on 01/26/23   POC Urinalysis Dipstick    Specimen: Urine   Result Value Ref Range    Color Yellow Yellow, Straw, Dark Yellow, Juliet    Clarity, UA Clear Clear    Glucose, UA Negative Negative mg/dL    Bilirubin Negative Negative    Ketones, UA Negative Negative    Specific Gravity  1.030 1.005 - 1.030     Blood, UA Negative Negative    pH, Urine 5.5 5.0 - 8.0    Protein, POC Negative Negative mg/dL    Urobilinogen, UA 0.2 E.U./dL Normal, 0.2 E.U./dL    Leukocytes Negative Negative    Nitrite, UA Negative Negative        Discussed results of mri prostate. FLY recheck and and prostate very enlarged and firm nodular are is palpable and even with Pi-Rads -2 due to psa and size of prostate, would recommend biopsy. Will schedule cystoscopy in office to evaluate prostate and if also recommend biopsy and discuss peyronies with Dr Montes. PSA level recollected today.  Patient will bring in previous bottle trimix from [facility]. May change concentration since no longer effective. Patient in agreement. Continue tamsulosin night.     Follow Up   No follow-ups on file.  Patient was given instructions and counseling regarding his condition or for health maintenance advice. Please see specific information pulled into the AVS if appropriate.     CARSON Luna

## 2023-01-27 NOTE — PROGRESS NOTES
Please call the patient and inform that his psa level has come down greatly. From 16.7 to 5.5. Still elevated but he also has a very large prostate. Thank you.

## 2023-02-06 ENCOUNTER — PROCEDURE VISIT (OUTPATIENT)
Dept: UROLOGY | Facility: CLINIC | Age: 78
End: 2023-02-06
Payer: MEDICARE

## 2023-02-06 DIAGNOSIS — N40.0 NODULAR HYPERPLASIA OF PROSTATE GLAND: ICD-10-CM

## 2023-02-06 DIAGNOSIS — N40.0 BENIGN PROSTATIC HYPERPLASIA, UNSPECIFIED WHETHER LOWER URINARY TRACT SYMPTOMS PRESENT: Primary | ICD-10-CM

## 2023-02-06 PROCEDURE — 51798 US URINE CAPACITY MEASURE: CPT | Performed by: UROLOGY

## 2023-02-06 PROCEDURE — 52000 CYSTOURETHROSCOPY: CPT | Performed by: UROLOGY

## 2023-02-06 PROCEDURE — 51741 ELECTRO-UROFLOWMETRY FIRST: CPT | Performed by: UROLOGY

## 2023-02-06 NOTE — PROGRESS NOTES
Cystoscopy    Date/Time: 2/6/2023 11:14 AM  Performed by: Mellisa Montes MD  Authorized by: Mellisa Montse MD   Preparation: Patient was prepped and draped in the usual sterile fashion.  Local anesthesia used: yes    Anesthesia:  Local anesthesia used: yes  Anesthetic total: 10 mL    Sedation:  Patient sedated: no        Indication.  BPH with obstructive uropathy    Patient was placed in lithotomy position.  Thorough scrubbing of lower abdomen and external genitalia was performed with Hibiclens.  18 Olympus flexible cystoscope was inserted into the urethra, which was normal.  Prostate gland is large and obstructive.  Patient has large lateral lobes and median lobe.  Bladder is trabeculated.  Both ureteral orifices are normal.  Bladder was looked at carefully and I do not see any bladder tumors.  There was no evidence of vesicocolic fistula.  Flexible cystoscope was removed and patient tolerated the procedure very well.    Uroflow.    Q-Toñito.  7.8 mL/s    Average flow.  3.6 mL/s    Voided volume.  339 mL    Ultrasound residual.  3.5 MHz transducer was applied in the suprapubic area and amount of urine calculated in the urinary bladder which was 14 ml.  This part was done by Mr. Christos gutierres.

## 2023-03-20 ENCOUNTER — ANESTHESIA EVENT (OUTPATIENT)
Dept: GASTROENTEROLOGY | Facility: HOSPITAL | Age: 78
End: 2023-03-20
Payer: MEDICARE

## 2023-03-20 ENCOUNTER — ANESTHESIA (OUTPATIENT)
Dept: GASTROENTEROLOGY | Facility: HOSPITAL | Age: 78
End: 2023-03-20
Payer: MEDICARE

## 2023-03-20 ENCOUNTER — HOSPITAL ENCOUNTER (OUTPATIENT)
Facility: HOSPITAL | Age: 78
Setting detail: HOSPITAL OUTPATIENT SURGERY
Discharge: HOME | End: 2023-03-20
Attending: SURGERY | Admitting: SURGERY
Payer: MEDICARE

## 2023-03-20 VITALS
WEIGHT: 183.42 LBS | SYSTOLIC BLOOD PRESSURE: 116 MMHG | DIASTOLIC BLOOD PRESSURE: 66 MMHG | HEIGHT: 71 IN | RESPIRATION RATE: 16 BRPM | TEMPERATURE: 97.4 F | OXYGEN SATURATION: 95 % | BODY MASS INDEX: 25.68 KG/M2 | HEART RATE: 54 BPM

## 2023-03-20 PROCEDURE — 25010000002 PROPOFOL 10 MG/ML EMULSION: Performed by: NURSE ANESTHETIST, CERTIFIED REGISTERED

## 2023-03-20 RX ORDER — PROPOFOL 10 MG/ML
VIAL (ML) INTRAVENOUS AS NEEDED
Status: DISCONTINUED | OUTPATIENT
Start: 2023-03-20 | End: 2023-03-20 | Stop reason: SURG

## 2023-03-20 RX ORDER — ONDANSETRON 4 MG/1
4 TABLET, FILM COATED ORAL ONCE AS NEEDED
Status: DISCONTINUED | OUTPATIENT
Start: 2023-03-20 | End: 2023-03-20 | Stop reason: HOSPADM

## 2023-03-20 RX ORDER — LIDOCAINE HYDROCHLORIDE 20 MG/ML
INJECTION, SOLUTION EPIDURAL; INFILTRATION; INTRACAUDAL; PERINEURAL AS NEEDED
Status: DISCONTINUED | OUTPATIENT
Start: 2023-03-20 | End: 2023-03-20 | Stop reason: SURG

## 2023-03-20 RX ORDER — SODIUM CHLORIDE, SODIUM LACTATE, POTASSIUM CHLORIDE, CALCIUM CHLORIDE 600; 310; 30; 20 MG/100ML; MG/100ML; MG/100ML; MG/100ML
INJECTION, SOLUTION INTRAVENOUS CONTINUOUS PRN
Status: DISCONTINUED | OUTPATIENT
Start: 2023-03-20 | End: 2023-03-20 | Stop reason: SURG

## 2023-03-20 RX ORDER — SODIUM CHLORIDE, SODIUM LACTATE, POTASSIUM CHLORIDE, CALCIUM CHLORIDE 600; 310; 30; 20 MG/100ML; MG/100ML; MG/100ML; MG/100ML
30 INJECTION, SOLUTION INTRAVENOUS CONTINUOUS
Status: DISCONTINUED | OUTPATIENT
Start: 2023-03-20 | End: 2023-03-20 | Stop reason: HOSPADM

## 2023-03-20 RX ORDER — ONDANSETRON 2 MG/ML
4 INJECTION INTRAMUSCULAR; INTRAVENOUS ONCE AS NEEDED
Status: DISCONTINUED | OUTPATIENT
Start: 2023-03-20 | End: 2023-03-20 | Stop reason: HOSPADM

## 2023-03-20 RX ADMIN — PROPOFOL 150 MCG/KG/MIN: 10 INJECTION, EMULSION INTRAVENOUS at 10:02

## 2023-03-20 RX ADMIN — SODIUM CHLORIDE, POTASSIUM CHLORIDE, SODIUM LACTATE AND CALCIUM CHLORIDE 30 ML/HR: 600; 310; 30; 20 INJECTION, SOLUTION INTRAVENOUS at 09:49

## 2023-03-20 RX ADMIN — PROPOFOL 125 MG: 10 INJECTION, EMULSION INTRAVENOUS at 10:03

## 2023-03-20 RX ADMIN — LIDOCAINE HYDROCHLORIDE 100 MG: 20 INJECTION, SOLUTION EPIDURAL; INFILTRATION; INTRACAUDAL; PERINEURAL at 10:03

## 2023-03-20 RX ADMIN — SODIUM CHLORIDE, POTASSIUM CHLORIDE, SODIUM LACTATE AND CALCIUM CHLORIDE: 600; 310; 30; 20 INJECTION, SOLUTION INTRAVENOUS at 09:50

## 2023-03-20 NOTE — ANESTHESIA PREPROCEDURE EVALUATION
Anesthesia Evaluation     Patient summary reviewed and Nursing notes reviewed   no history of anesthetic complications:  NPO Solid Status: > 8 hours  NPO Liquid Status: > 2 hours           Airway   Mallampati: II  TM distance: >3 FB  Neck ROM: full  No difficulty expected  Dental      Pulmonary - negative pulmonary ROS and normal exam    breath sounds clear to auscultation  Cardiovascular - normal exam  Exercise tolerance: good (4-7 METS)    Rhythm: regular  Rate: normal    (+) hypertension, CAD, CABG, hyperlipidemia,       Neuro/Psych- negative ROS  GI/Hepatic/Renal/Endo    (+)  GERD,  renal disease, thyroid problem hypothyroidism    Musculoskeletal     Abdominal    Substance History      OB/GYN          Other        ROS/Med Hx Other: PAT Nursing Notes unavailable.                   Anesthesia Plan    ASA 3     general   total IV anesthesia    Anesthetic plan, risks, benefits, and alternatives have been provided, discussed and informed consent has been obtained with: patient.        CODE STATUS:

## 2023-03-20 NOTE — ANESTHESIA POSTPROCEDURE EVALUATION
Patient: Reji Mcmillan    Procedure Summary     Date: 03/20/23 Room / Location: [facility] END[facility]COPY 4 / [facility]    Anesthesia Start: 0959 Anesthesia Stop: 1031    Procedure: COLONOSCOPY Diagnosis:       Screening for malignant neoplasm of colon      History of colonic polyps      (Screening for malignant neoplasm of colon [Z12.11])      (History of colonic polyps [Z86.010])    Surgeons: Jacques Dc MD Provider: Chris Will MD    Anesthesia Type: general ASA Status: 3          Anesthesia Type: general    Vitals  Vitals Value Taken Time   /62 03/20/23 1040   Temp 36.2 °C (97.2 °F) 03/20/23 1030   Pulse 58 03/20/23 1040   Resp 14 03/20/23 1040   SpO2 96 % 03/20/23 1040           Post Anesthesia Care and Evaluation    Patient location during evaluation: bedside  Patient participation: complete - patient participated  Level of consciousness: awake  Pain management: adequate    Airway patency: patent  Anesthetic complications: No anesthetic complications  PONV Status: none  Cardiovascular status: acceptable and stable  Respiratory status: acceptable  Hydration status: acceptable    Comments: An Anesthesiologist personally participated in the most demanding procedures (including induction and emergence if applicable) in the anesthesia plan, monitored the course of anesthesia administration at frequent intervals and remained physically present and available for immediate diagnosis and treatment of emergencies.

## 2023-03-20 NOTE — H&P
[facility]   HISTORY AND PHYSICAL    Patient Name: Reji Mcmillan  : 1945  MRN: [id number]  Primary Care Physician:  Kenzie Mccloud APRN  Date of admission: 3/20/2023    Subjective   Subjective     Chief Complaint: Colon cancer screening    HPI:    Reji Mcmillan is a 77 y.o. male with a prior history of colon polyps.  He presents for colon cancer screening.    Review of Systems   Respiratory: Negative for shortness of breath.    Cardiovascular: Negative for chest pain.   Gastrointestinal: Negative for abdominal pain.       Personal History     Past Medical History:   Diagnosis Date   • Allergic rhinitis due to allergen    • Benign prostatic hyperplasia    • CAD s/p CABG 2022   • Chest pain    • Chronic allergic rhinitis    • CKD (chronic kidney disease) stage 3, GFR 30-59 ml/min ([facility])    • Eczema    • Erectile dysfunction 10/19/2020   • GERD (gastroesophageal reflux disease)    • Heart disease    • High blood pressure    • High cholesterol    • Hypercholesteremia    • Hypertension     Controlled   • Hypothyroidism    • Polio    • Prostate disorder        Past Surgical History:   Procedure Laterality Date   • CARDIAC SURGERY     • CHOLECYSTECTOMY     • COLONOSCOPY     • CORONARY ARTERY BYPASS GRAFT      cabbage x2   • DENTAL PROCEDURE     • HERNIA REPAIR     • TOE SURGERY Left        Family History: family history includes Heart attack in his father; Heart disease in his mother; Hyperlipidemia in his father and mother; Hypertension in his father and mother. Otherwise pertinent FHx was reviewed and not pertinent to current issue.    Social History:  reports that he has never smoked. He has never used smokeless tobacco. He reports current alcohol use. He reports that he does not use drugs.    Home Medications:  Dupilumab, PARoxetine, amLODIPine, aspirin, atorvastatin, cetirizine, lansoprazole, levothyroxine, losartan-hydrochlorothiazide, ranolazine, tamsulosin, triamcinolone, and zolpidem  CR    Allergies:  No Known Allergies    Objective    Objective     Vitals:   Temp:  [97.9 °F (36.6 °C)] 97.9 °F (36.6 °C)  Heart Rate:  [59] 59  Resp:  [18] 18  BP: (138)/(64) 138/64    Physical Exam  HENT:      Head: Normocephalic.   Cardiovascular:      Rate and Rhythm: Normal rate.   Pulmonary:      Effort: Pulmonary effort is normal.   Abdominal:      Palpations: Abdomen is soft.   Musculoskeletal:         General: Normal range of motion.      Cervical back: Normal range of motion.   Skin:     General: Skin is warm.   Neurological:      General: No focal deficit present.      Mental Status: He is alert.   Psychiatric:         Mood and Affect: Mood normal.         Result Review    Result Review:  I have personally reviewed the results from the time of this admission to 3/20/2023 09:24 EDT and agree with these findings:  []  Laboratory  []  Microbiology  []  Radiology  []  EKG/Telemetry   []  Cardiology/Vascular   []  Pathology  []  Old records  []  Other:  Most notable findings include:     Assessment & Plan   Assessment / Plan     Brief Patient Summary:  Reji Mcmillan is a 77 y.o. male who presents for colon cancer screening.    Active Hospital Problems:  Active Hospital Problems    Diagnosis    • Screening for malignant neoplasm of colon    • History of colonic polyps        Plan:   We will proceed with a colonoscopy.  Risk benefits and alternatives were explained.    DVT prophylaxis:  No DVT prophylaxis order currently exists.    CODE STATUS:         Admission Status:  I believe this patient meets outpatient status.    Electronically signed by Jacques Dc MD, 03/20/23, 9:24 AM EDT.

## 2023-03-21 ENCOUNTER — TRANSCRIBE ORDERS (OUTPATIENT)
Dept: LAB | Facility: HOSPITAL | Age: 78
End: 2023-03-21
Payer: MEDICARE

## 2023-03-21 ENCOUNTER — LAB (OUTPATIENT)
Dept: LAB | Facility: HOSPITAL | Age: 78
End: 2023-03-21
Payer: MEDICARE

## 2023-03-21 DIAGNOSIS — N18.32 CHRONIC KIDNEY DISEASE (CKD) STAGE G3B/A1, MODERATELY DECREASED GLOMERULAR FILTRATION RATE (GFR) BETWEEN 30-44 ML/MIN/1.73 SQUARE METER AND ALBUMINURIA CREATININE RATIO LESS THAN 30 MG/G (CMS/H*: ICD-10-CM

## 2023-03-21 DIAGNOSIS — N18.32 CHRONIC KIDNEY DISEASE (CKD) STAGE G3B/A1, MODERATELY DECREASED GLOMERULAR FILTRATION RATE (GFR) BETWEEN 30-44 ML/MIN/1.73 SQUARE METER AND ALBUMINURIA CREATININE RATIO LESS THAN 30 MG/G (CMS/H*: Primary | ICD-10-CM

## 2023-03-21 DIAGNOSIS — E55.9 VITAMIN D DEFICIENCY, UNSPECIFIED: ICD-10-CM

## 2023-03-21 LAB
25(OH)D3 SERPL-MCNC: 27.9 NG/ML (ref 30–100)
BACTERIA UR QL AUTO: ABNORMAL /HPF
BASOPHILS # BLD AUTO: 0.02 10*3/MM3 (ref 0–0.2)
BASOPHILS NFR BLD AUTO: 0.3 % (ref 0–1.5)
BILIRUB UR QL STRIP: NEGATIVE
CLARITY UR: ABNORMAL
COLOR UR: ABNORMAL
CREAT UR-MCNC: 309 MG/DL
DEPRECATED RDW RBC AUTO: 39.5 FL (ref 37–54)
EOSINOPHIL # BLD AUTO: 0.1 10*3/MM3 (ref 0–0.4)
EOSINOPHIL NFR BLD AUTO: 1.7 % (ref 0.3–6.2)
ERYTHROCYTE [DISTWIDTH] IN BLOOD BY AUTOMATED COUNT: 13.2 % (ref 12.3–15.4)
GLUCOSE UR STRIP-MCNC: NEGATIVE MG/DL
HCT VFR BLD AUTO: 39.1 % (ref 37.5–51)
HGB BLD-MCNC: 13 G/DL (ref 13–17.7)
HGB UR QL STRIP.AUTO: NEGATIVE
HYALINE CASTS UR QL AUTO: ABNORMAL /LPF
IMM GRANULOCYTES # BLD AUTO: 0.02 10*3/MM3 (ref 0–0.05)
IMM GRANULOCYTES NFR BLD AUTO: 0.3 % (ref 0–0.5)
KETONES UR QL STRIP: ABNORMAL
LEUKOCYTE ESTERASE UR QL STRIP.AUTO: ABNORMAL
LYMPHOCYTES # BLD AUTO: 0.98 10*3/MM3 (ref 0.7–3.1)
LYMPHOCYTES NFR BLD AUTO: 16.8 % (ref 19.6–45.3)
MCH RBC QN AUTO: 27.5 PG (ref 26.6–33)
MCHC RBC AUTO-ENTMCNC: 33.2 G/DL (ref 31.5–35.7)
MCV RBC AUTO: 82.8 FL (ref 79–97)
MONOCYTES # BLD AUTO: 0.62 10*3/MM3 (ref 0.1–0.9)
MONOCYTES NFR BLD AUTO: 10.6 % (ref 5–12)
NEUTROPHILS NFR BLD AUTO: 4.11 10*3/MM3 (ref 1.7–7)
NEUTROPHILS NFR BLD AUTO: 70.3 % (ref 42.7–76)
NITRITE UR QL STRIP: NEGATIVE
NRBC BLD AUTO-RTO: 0 /100 WBC (ref 0–0.2)
PH UR STRIP.AUTO: 5.5 [PH] (ref 5–8)
PLATELET # BLD AUTO: 270 10*3/MM3 (ref 140–450)
PMV BLD AUTO: 10.4 FL (ref 6–12)
PROT ?TM UR-MCNC: 22 MG/DL
PROT UR QL STRIP: ABNORMAL
PROT/CREAT UR: 0.07 MG/G{CREAT}
PTH-INTACT SERPL-MCNC: 36.5 PG/ML (ref 15–65)
RBC # BLD AUTO: 4.72 10*6/MM3 (ref 4.14–5.8)
RBC # UR STRIP: ABNORMAL /HPF
REF LAB TEST METHOD: ABNORMAL
SP GR UR STRIP: 1.03 (ref 1–1.03)
SQUAMOUS #/AREA URNS HPF: ABNORMAL /HPF
UROBILINOGEN UR QL STRIP: ABNORMAL
WBC # UR STRIP: ABNORMAL /HPF
WBC NRBC COR # BLD: 5.85 10*3/MM3 (ref 3.4–10.8)

## 2023-03-21 PROCEDURE — 82306 VITAMIN D 25 HYDROXY: CPT

## 2023-03-21 PROCEDURE — 82570 ASSAY OF URINE CREATININE: CPT

## 2023-03-21 PROCEDURE — 84156 ASSAY OF PROTEIN URINE: CPT

## 2023-03-21 PROCEDURE — 80069 RENAL FUNCTION PANEL: CPT

## 2023-03-21 PROCEDURE — 83970 ASSAY OF PARATHORMONE: CPT

## 2023-03-21 PROCEDURE — 81001 URINALYSIS AUTO W/SCOPE: CPT

## 2023-03-21 PROCEDURE — 85025 COMPLETE CBC W/AUTO DIFF WBC: CPT

## 2023-03-21 PROCEDURE — 36415 COLL VENOUS BLD VENIPUNCTURE: CPT

## 2023-03-22 LAB
ALBUMIN SERPL-MCNC: 4.4 G/DL (ref 3.5–5.2)
ANION GAP SERPL CALCULATED.3IONS-SCNC: 9.8 MMOL/L (ref 5–15)
BUN SERPL-MCNC: 14 MG/DL (ref 8–23)
BUN/CREAT SERPL: 9.7 (ref 7–25)
CALCIUM SPEC-SCNC: 9.4 MG/DL (ref 8.6–10.5)
CHLORIDE SERPL-SCNC: 100 MMOL/L (ref 98–107)
CO2 SERPL-SCNC: 26.2 MMOL/L (ref 22–29)
CREAT SERPL-MCNC: 1.44 MG/DL (ref 0.76–1.27)
EGFRCR SERPLBLD CKD-EPI 2021: 50.4 ML/MIN/1.73
GLUCOSE SERPL-MCNC: 108 MG/DL (ref 65–99)
PHOSPHATE SERPL-MCNC: 3.2 MG/DL (ref 2.5–4.5)
POTASSIUM SERPL-SCNC: 3.9 MMOL/L (ref 3.5–5.2)
SODIUM SERPL-SCNC: 136 MMOL/L (ref 136–145)

## 2023-04-05 ENCOUNTER — LAB (OUTPATIENT)
Dept: LAB | Facility: HOSPITAL | Age: 78
End: 2023-04-05
Payer: MEDICARE

## 2023-04-05 DIAGNOSIS — D50.9 IRON DEFICIENCY ANEMIA, UNSPECIFIED IRON DEFICIENCY ANEMIA TYPE: ICD-10-CM

## 2023-04-05 DIAGNOSIS — I10 ESSENTIAL HYPERTENSION: Chronic | ICD-10-CM

## 2023-04-05 DIAGNOSIS — E03.9 HYPOTHYROIDISM, UNSPECIFIED TYPE: Chronic | ICD-10-CM

## 2023-04-05 DIAGNOSIS — Z79.899 ENCOUNTER FOR LONG-TERM (CURRENT) USE OF MEDICATIONS: ICD-10-CM

## 2023-04-05 LAB
ALBUMIN SERPL-MCNC: 4.5 G/DL (ref 3.5–5.2)
ALBUMIN/GLOB SERPL: 1.6 G/DL
ALP SERPL-CCNC: 55 U/L (ref 39–117)
ALT SERPL W P-5'-P-CCNC: 18 U/L (ref 1–41)
AMPHET+METHAMPHET UR QL: NEGATIVE
ANION GAP SERPL CALCULATED.3IONS-SCNC: 8.7 MMOL/L (ref 5–15)
AST SERPL-CCNC: 17 U/L (ref 1–40)
BARBITURATES UR QL SCN: NEGATIVE
BASOPHILS # BLD AUTO: 0.02 10*3/MM3 (ref 0–0.2)
BASOPHILS NFR BLD AUTO: 0.3 % (ref 0–1.5)
BENZODIAZ UR QL SCN: NEGATIVE
BILIRUB SERPL-MCNC: 0.6 MG/DL (ref 0–1.2)
BUN SERPL-MCNC: 15 MG/DL (ref 8–23)
BUN/CREAT SERPL: 9.1 (ref 7–25)
CALCIUM SPEC-SCNC: 9.9 MG/DL (ref 8.6–10.5)
CANNABINOIDS SERPL QL: NEGATIVE
CHLORIDE SERPL-SCNC: 100 MMOL/L (ref 98–107)
CO2 SERPL-SCNC: 29.3 MMOL/L (ref 22–29)
COCAINE UR QL: NEGATIVE
CREAT SERPL-MCNC: 1.65 MG/DL (ref 0.76–1.27)
DEPRECATED RDW RBC AUTO: 38.8 FL (ref 37–54)
EGFRCR SERPLBLD CKD-EPI 2021: 42.8 ML/MIN/1.73
EOSINOPHIL # BLD AUTO: 0.14 10*3/MM3 (ref 0–0.4)
EOSINOPHIL NFR BLD AUTO: 2.2 % (ref 0.3–6.2)
ERYTHROCYTE [DISTWIDTH] IN BLOOD BY AUTOMATED COUNT: 13.1 % (ref 12.3–15.4)
GLOBULIN UR ELPH-MCNC: 2.9 GM/DL
GLUCOSE SERPL-MCNC: 107 MG/DL (ref 65–99)
HCT VFR BLD AUTO: 39.2 % (ref 37.5–51)
HGB BLD-MCNC: 13.5 G/DL (ref 13–17.7)
IMM GRANULOCYTES # BLD AUTO: 0.01 10*3/MM3 (ref 0–0.05)
IMM GRANULOCYTES NFR BLD AUTO: 0.2 % (ref 0–0.5)
IRON 24H UR-MRATE: 81 MCG/DL (ref 59–158)
LYMPHOCYTES # BLD AUTO: 1.26 10*3/MM3 (ref 0.7–3.1)
LYMPHOCYTES NFR BLD AUTO: 20.1 % (ref 19.6–45.3)
MCH RBC QN AUTO: 28 PG (ref 26.6–33)
MCHC RBC AUTO-ENTMCNC: 34.4 G/DL (ref 31.5–35.7)
MCV RBC AUTO: 81.3 FL (ref 79–97)
METHADONE UR QL SCN: NEGATIVE
MONOCYTES # BLD AUTO: 0.64 10*3/MM3 (ref 0.1–0.9)
MONOCYTES NFR BLD AUTO: 10.2 % (ref 5–12)
NEUTROPHILS NFR BLD AUTO: 4.2 10*3/MM3 (ref 1.7–7)
NEUTROPHILS NFR BLD AUTO: 67 % (ref 42.7–76)
NRBC BLD AUTO-RTO: 0 /100 WBC (ref 0–0.2)
OPIATES UR QL: NEGATIVE
OXYCODONE UR QL SCN: NEGATIVE
PLATELET # BLD AUTO: 276 10*3/MM3 (ref 140–450)
PMV BLD AUTO: 10.5 FL (ref 6–12)
POTASSIUM SERPL-SCNC: 3.8 MMOL/L (ref 3.5–5.2)
PROT SERPL-MCNC: 7.4 G/DL (ref 6–8.5)
RBC # BLD AUTO: 4.82 10*6/MM3 (ref 4.14–5.8)
SODIUM SERPL-SCNC: 138 MMOL/L (ref 136–145)
T4 FREE SERPL-MCNC: 1.23 NG/DL (ref 0.93–1.7)
TSH SERPL DL<=0.05 MIU/L-ACNC: 3.22 UIU/ML (ref 0.27–4.2)
WBC NRBC COR # BLD: 6.27 10*3/MM3 (ref 3.4–10.8)

## 2023-04-05 PROCEDURE — 80307 DRUG TEST PRSMV CHEM ANLYZR: CPT

## 2023-04-05 PROCEDURE — 85025 COMPLETE CBC W/AUTO DIFF WBC: CPT

## 2023-04-05 PROCEDURE — 83540 ASSAY OF IRON: CPT

## 2023-04-05 PROCEDURE — 84439 ASSAY OF FREE THYROXINE: CPT

## 2023-04-05 PROCEDURE — 36415 COLL VENOUS BLD VENIPUNCTURE: CPT

## 2023-04-05 PROCEDURE — 80053 COMPREHEN METABOLIC PANEL: CPT

## 2023-04-05 PROCEDURE — 84443 ASSAY THYROID STIM HORMONE: CPT

## 2023-04-06 ENCOUNTER — TELEPHONE (OUTPATIENT)
Dept: INTERNAL MEDICINE | Facility: CLINIC | Age: 78
End: 2023-04-06
Payer: MEDICARE

## 2023-04-10 ENCOUNTER — OFFICE VISIT (OUTPATIENT)
Dept: INTERNAL MEDICINE | Facility: CLINIC | Age: 78
End: 2023-04-10
Payer: MEDICARE

## 2023-04-10 VITALS
HEIGHT: 71 IN | TEMPERATURE: 97.5 F | WEIGHT: 185.2 LBS | SYSTOLIC BLOOD PRESSURE: 100 MMHG | DIASTOLIC BLOOD PRESSURE: 58 MMHG | OXYGEN SATURATION: 96 % | RESPIRATION RATE: 12 BRPM | HEART RATE: 66 BPM | BODY MASS INDEX: 25.93 KG/M2

## 2023-04-10 DIAGNOSIS — F32.A ANXIETY AND DEPRESSION: ICD-10-CM

## 2023-04-10 DIAGNOSIS — E03.9 HYPOTHYROIDISM, UNSPECIFIED TYPE: ICD-10-CM

## 2023-04-10 DIAGNOSIS — E55.9 VITAMIN D DEFICIENCY: ICD-10-CM

## 2023-04-10 DIAGNOSIS — D50.9 IRON DEFICIENCY ANEMIA, UNSPECIFIED IRON DEFICIENCY ANEMIA TYPE: ICD-10-CM

## 2023-04-10 DIAGNOSIS — I10 ESSENTIAL HYPERTENSION: ICD-10-CM

## 2023-04-10 DIAGNOSIS — R73.9 ELEVATED BLOOD SUGAR: ICD-10-CM

## 2023-04-10 DIAGNOSIS — Z00.00 ENCOUNTER FOR ANNUAL WELLNESS EXAM IN MEDICARE PATIENT: Primary | ICD-10-CM

## 2023-04-10 DIAGNOSIS — F41.9 ANXIETY AND DEPRESSION: ICD-10-CM

## 2023-04-10 DIAGNOSIS — G47.00 INSOMNIA, UNSPECIFIED TYPE: ICD-10-CM

## 2023-04-10 NOTE — PROGRESS NOTES
The ABCs of the Annual Wellness Visit  Subsequent Medicare Wellness Visit    Subjective    Reji Mcmillan is a 77 y.o. male who presents for a Subsequent Medicare Wellness Visit.    The following portions of the patient's history were reviewed and   updated as appropriate:   He  has a past medical history of Allergic rhinitis due to allergen, Benign prostatic hyperplasia, CAD s/p CABG (03/30/2022), Chest pain, Chronic allergic rhinitis, CKD (chronic kidney disease) stage 3, GFR 30-59 ml/min, Eczema, Erectile dysfunction (10/19/2020), GERD (gastroesophageal reflux disease), Heart disease, High blood pressure, High cholesterol, Hypercholesteremia, Hypertension, Hypothyroidism, Polio, and Prostate disorder.  He has Essential hypertension; Hypothyroidism; Vitamin D deficiency; Anxiety and depression; Insomnia; and Stage 3 chronic kidney disease on their pertinent problem list.  He  has a past surgical history that includes Cardiac surgery; Cholecystectomy; Colonoscopy; Coronary artery bypass graft; Hernia repair; Dental surgery; Toe Surgery (Left); and Colonoscopy (N/A, 3/20/2023).  His family history includes Heart attack in his father; Heart disease in his mother; Hyperlipidemia in his father and mother; Hypertension in his father and mother.  He  reports that he has never smoked. He has never used smokeless tobacco. He reports current alcohol use. He reports that he does not use drugs.  Current Outpatient Medications   Medication Sig Dispense Refill   • amLODIPine (NORVASC) 10 MG tablet Take 1 tablet by mouth Daily. 90 tablet 3   • aspirin 81 MG EC tablet Take 1 tablet by mouth Daily.     • atorvastatin (LIPITOR) 80 MG tablet Take 1 tablet by mouth Daily. 90 tablet 3   • cetirizine (zyrTEC) 10 MG tablet Take 1 tablet by mouth Daily.     • Dupilumab (Dupixent) 300 MG/2ML solution pen-injector Inject 1 mL under the skin into the appropriate area as directed Every 14 (Fourteen) Days.     • lansoprazole (PREVACID) 30  MG capsule Take 1 capsule by mouth Daily. 90 capsule 1   • levothyroxine (SYNTHROID, LEVOTHROID) 25 MCG tablet Take 1 tablet by mouth Daily. 90 tablet 3   • losartan-hydrochlorothiazide (Hyzaar) 100-25 MG per tablet Take 1/2 tab daily. 45 tablet 3   • PARoxetine (PAXIL) 10 MG tablet Take 1 tablet by mouth Every Morning. 90 tablet 3   • ranolazine (RANEXA) 500 MG 12 hr tablet Take 1 tablet by mouth 2 (Two) Times a Day. 180 tablet 3   • tamsulosin (FLOMAX) 0.4 MG capsule 24 hr capsule Take 1 capsule by mouth Daily. 90 capsule 3   • triamcinolone (KENALOG) 0.1 % ointment Apply 1 application topically to the appropriate area as directed 3 (Three) Times a Day As Needed.     • zolpidem CR (AMBIEN CR) 12.5 MG CR tablet Take 1 tablet by mouth At Night As Needed for Sleep. 30 tablet 5     No current facility-administered medications for this visit.     Current Outpatient Medications on File Prior to Visit   Medication Sig   • amLODIPine (NORVASC) 10 MG tablet Take 1 tablet by mouth Daily.   • aspirin 81 MG EC tablet Take 1 tablet by mouth Daily.   • atorvastatin (LIPITOR) 80 MG tablet Take 1 tablet by mouth Daily.   • cetirizine (zyrTEC) 10 MG tablet Take 1 tablet by mouth Daily.   • Dupilumab (Dupixent) 300 MG/2ML solution pen-injector Inject 1 mL under the skin into the appropriate area as directed Every 14 (Fourteen) Days.   • lansoprazole (PREVACID) 30 MG capsule Take 1 capsule by mouth Daily.   • levothyroxine (SYNTHROID, LEVOTHROID) 25 MCG tablet Take 1 tablet by mouth Daily.   • losartan-hydrochlorothiazide (Hyzaar) 100-25 MG per tablet Take 1/2 tab daily.   • PARoxetine (PAXIL) 10 MG tablet Take 1 tablet by mouth Every Morning.   • ranolazine (RANEXA) 500 MG 12 hr tablet Take 1 tablet by mouth 2 (Two) Times a Day.   • tamsulosin (FLOMAX) 0.4 MG capsule 24 hr capsule Take 1 capsule by mouth Daily.   • triamcinolone (KENALOG) 0.1 % ointment Apply 1 application topically to the appropriate area as directed 3 (Three)  Times a Day As Needed.   • zolpidem CR (AMBIEN CR) 12.5 MG CR tablet Take 1 tablet by mouth At Night As Needed for Sleep.     No current facility-administered medications on file prior to visit.     He is allergic to sulfa antibiotics..    Compared to one year ago, the patient feels his physical   health is the same.    Compared to one year ago, the patient feels his mental   health is the same.    Recent Hospitalizations:  He was not admitted to the hospital during the last year.       Current Medical Providers:  Patient Care Team:  Kenzie Mccloud APRN as PCP - General (Nurse Practitioner)    Outpatient Medications Prior to Visit   Medication Sig Dispense Refill   • amLODIPine (NORVASC) 10 MG tablet Take 1 tablet by mouth Daily. 90 tablet 3   • aspirin 81 MG EC tablet Take 1 tablet by mouth Daily.     • atorvastatin (LIPITOR) 80 MG tablet Take 1 tablet by mouth Daily. 90 tablet 3   • cetirizine (zyrTEC) 10 MG tablet Take 1 tablet by mouth Daily.     • Dupilumab (Dupixent) 300 MG/2ML solution pen-injector Inject 1 mL under the skin into the appropriate area as directed Every 14 (Fourteen) Days.     • lansoprazole (PREVACID) 30 MG capsule Take 1 capsule by mouth Daily. 90 capsule 1   • levothyroxine (SYNTHROID, LEVOTHROID) 25 MCG tablet Take 1 tablet by mouth Daily. 90 tablet 3   • losartan-hydrochlorothiazide (Hyzaar) 100-25 MG per tablet Take 1/2 tab daily. 45 tablet 3   • PARoxetine (PAXIL) 10 MG tablet Take 1 tablet by mouth Every Morning. 90 tablet 3   • ranolazine (RANEXA) 500 MG 12 hr tablet Take 1 tablet by mouth 2 (Two) Times a Day. 180 tablet 3   • tamsulosin (FLOMAX) 0.4 MG capsule 24 hr capsule Take 1 capsule by mouth Daily. 90 capsule 3   • triamcinolone (KENALOG) 0.1 % ointment Apply 1 application topically to the appropriate area as directed 3 (Three) Times a Day As Needed.     • zolpidem CR (AMBIEN CR) 12.5 MG CR tablet Take 1 tablet by mouth At Night As Needed for Sleep. 30 tablet 5     No  "facility-administered medications prior to visit.       No opioid medication identified on active medication list. I have reviewed chart for other potential  high risk medication/s and harmful drug interactions in the elderly.          Aspirin is on active medication list. Aspirin use is indicated based on review of current medical condition/s. Pros and cons of this therapy have been discussed today. Benefits of this medication outweigh potential harm.  Patient has been encouraged to continue taking this medication.  .      Patient Active Problem List   Diagnosis   • Essential hypertension   • Hyperlipidemia   • Hypothyroidism   • Vitamin D deficiency   • Anxiety and depression   • Insomnia   • Anemia   • Chronic low back pain   • Nodular hyperplasia of prostate gland   • Elevated prostate specific antigen (PSA)   • Disorder of arteries and arterioles, unspecified (HCC)   • Stage 3 chronic kidney disease   • Orthostatic hypotension   • CAD s/p CABG   • Peyronie disease   • Vasculogenic erectile dysfunction   • History of elevated PSA   • Acute URI   • Screening for malignant neoplasm of colon   • History of colonic polyps   • Other male erectile dysfunction     Advance Care Planning   Advance Care Planning     Advance Directive is on file.  ACP discussion was held with the patient during this visit. Patient has an advance directive in EMR which is still valid.      Objective    Vitals:    04/10/23 1031   BP: 100/58   BP Location: Left arm   Patient Position: Sitting   Cuff Size: Adult   Pulse: 66   Resp: 12   Temp: 97.5 °F (36.4 °C)   TempSrc: Skin   SpO2: 96%   Weight: 84 kg (185 lb 3.2 oz)   Height: 180.3 cm (71\")     Estimated body mass index is 25.83 kg/m² as calculated from the following:    Height as of this encounter: 180.3 cm (71\").    Weight as of this encounter: 84 kg (185 lb 3.2 oz).    BMI is >= 25 and <30. (Overweight) The following options were offered after discussion;: nutrition " counseling/recommendations      Does the patient have evidence of cognitive impairment? No          HEALTH RISK ASSESSMENT    Smoking Status:  Social History     Tobacco Use   Smoking Status Never   Smokeless Tobacco Never     Alcohol Consumption:  Social History     Substance and Sexual Activity   Alcohol Use Yes    Comment: drinks rarely; beer and liquor     Fall Risk Screen:    MICHELE Fall Risk Assessment was completed, and patient is at LOW risk for falls.Assessment completed on:4/10/2023    Depression Screening:  PHQ-2/PHQ-9 Depression Screening 4/10/2023   Little Interest or Pleasure in Doing Things 0-->not at all   Feeling Down, Depressed or Hopeless 1-->several days   Trouble Falling or Staying Asleep, or Sleeping Too Much 2-->more than half the days   Feeling Tired or Having Little Energy 1-->several days   Poor Appetite or Overeating 0-->not at all   Feeling Bad about Yourself - or that You are a Failure or Have Let Yourself or Your Family Down 0-->not at all   Trouble Concentrating on Things, Such as Reading the Newspaper or Watching Television 0-->not at all   Moving or Speaking So Slowly that Other People Could Have Noticed? Or the Opposite - Being So Fidgety 0-->not at all   Thoughts that You Would be Better Off Dead or of Hurting Yourself in Some Way 0-->not at all   PHQ-9: Brief Depression Severity Measure Score 4   If You Checked Off Any Problems, How Difficult Have These Problems Made It For You to Do Your Work, Take Care of Things at Home, or Get Along with Other People? not difficult at all       Health Habits and Functional and Cognitive Screening:  Functional & Cognitive Status 4/10/2023   Do you have difficulty preparing food and eating? No   Do you have difficulty bathing yourself, getting dressed or grooming yourself? No   Do you have difficulty using the toilet? No   Do you have difficulty moving around from place to place? No   Do you have trouble with steps or getting out of a bed or a  chair? No   Current Diet Well Balanced Diet   Dental Exam Up to date   Eye Exam Up to date   Exercise (times per week) 1 times per week   Current Exercises Include Walking   Do you need help using the phone?  No   Are you deaf or do you have serious difficulty hearing?  No   Do you need help with transportation? No   Do you need help shopping? No   Do you need help preparing meals?  No   Do you need help with housework?  No   Do you need help with laundry? No   Do you need help taking your medications? No   Do you need help managing money? No   Do you ever drive or ride in a car without wearing a seat belt? No   Have you felt unusual stress, anger or loneliness in the last month? No   Who do you live with? Spouse   If you need help, do you have trouble finding someone available to you? No   Have you been bothered in the last four weeks by sexual problems? Yes   Do you have difficulty concentrating, remembering or making decisions? No       Age-appropriate Screening Schedule:  Refer to the list below for future screening recommendations based on patient's age, sex and/or medical conditions. Orders for these recommended tests are listed in the plan section. The patient has been provided with a written plan.    Health Maintenance   Topic Date Due   • TDAP/TD VACCINES (1 - Tdap) Never done   • ZOSTER VACCINE (1 of 2) Never done   • INFLUENZA VACCINE  08/01/2023   • LIPID PANEL  10/05/2023   • ANNUAL WELLNESS VISIT  04/10/2024   • HEPATITIS C SCREENING  Completed   • COVID-19 Vaccine  Completed   • Pneumococcal Vaccine 65+  Completed   • COLORECTAL CANCER SCREENING  Discontinued                  CMS Preventative Services Quick Reference  Risk Factors Identified During Encounter  Immunizations Discussed/Encouraged: Td, Tdap and Shingrix  The above risks/problems have been discussed with the patient.  Pertinent information has been shared with the patient in the After Visit Summary.  An After Visit Summary and PPPS were  "made available to the patient.    Follow Up:   Next Medicare Wellness visit to be scheduled in 1 year.       Additional E&M Note during same encounter follows:  Patient has multiple medical problems which are significant and separately identifiable that require additional work above and beyond the Medicare Wellness Visit.      Chief Complaint  Follow-up (76 yo male here for a medicare wellness check up.)    Subjective        HPI  Reji Mcmillan is also being seen today for follow-up of insomnia, anxiety, hypothyroidism and hypertension.  Sleeps all night on the Ambien CD.  Negative for chest pain, shortness of air on exertion and heart palpitations.  Positive for fatigue and occasional dizziness with standing.  Recent labs reviewed.     Specialist include:  Dr. Gutiérrez for CKD-no medication changes, urology for prostate and Dr. Mata for CAD and hyperlipidemia.        Objective   Vital Signs:  /58 (BP Location: Left arm, Patient Position: Sitting, Cuff Size: Adult)   Pulse 66   Temp 97.5 °F (36.4 °C) (Skin)   Resp 12   Ht 180.3 cm (71\")   Wt 84 kg (185 lb 3.2 oz)   SpO2 96%   BMI 25.83 kg/m²     Physical Exam  Vitals reviewed.   Constitutional:       General: He is not in acute distress.  HENT:      Head: Normocephalic and atraumatic.      Right Ear: Tympanic membrane and ear canal normal.      Left Ear: Tympanic membrane and ear canal normal.   Eyes:      Conjunctiva/sclera: Conjunctivae normal.   Cardiovascular:      Rate and Rhythm: Normal rate and regular rhythm.      Heart sounds: Normal heart sounds. No murmur heard.  Pulmonary:      Effort: Pulmonary effort is normal.      Breath sounds: Normal breath sounds. No wheezing, rhonchi or rales.   Abdominal:      General: There is no distension.      Palpations: Abdomen is soft. There is no mass.      Tenderness: There is no abdominal tenderness.   Musculoskeletal:      Right lower leg: No edema.      Left lower leg: No edema.   Lymphadenopathy:      " Cervical: No cervical adenopathy.   Skin:     General: Skin is warm and dry.      Coloration: Skin is not jaundiced or pale.   Neurological:      General: No focal deficit present.      Mental Status: He is alert.   Psychiatric:         Mood and Affect: Mood normal.         Thought Content: Thought content normal.          The following data was reviewed by: CARSON Gleason on 04/10/2023:  CMP    CMP 11/18/22 3/21/23 4/5/23   Glucose 88 108 (A) 107 (A)   BUN 34 (A) 14 15   Creatinine 2.41 (A) 1.44 (A) 1.65 (A)   eGFR 27.1 (A) 50.4 (A) 42.8 (A)   Sodium 128 (A) 136 138   Potassium 3.8 3.9 3.8   Chloride 93 (A) 100 100   Calcium 9.2 9.4 9.9   Total Protein 7.1  7.4   Albumin 3.90 4.4 4.5   Globulin 3.2  2.9   Total Bilirubin 0.5  0.6   Alkaline Phosphatase 59  55   AST (SGOT) 23  17   ALT (SGPT) 23  18   Albumin/Globulin Ratio 1.2  1.6   BUN/Creatinine Ratio 14.1 9.7 9.1   Anion Gap 14.4 9.8 8.7   (A) Abnormal value       Comments are available for some flowsheets but are not being displayed.           CBC w/diff    CBC w/Diff 11/18/22 3/21/23 4/5/23   WBC 6.03 5.85 6.27   RBC 4.53 4.72 4.82   Hemoglobin 12.6 (A) 13.0 13.5   Hematocrit 35.4 (A) 39.1 39.2   MCV 78.1 (A) 82.8 81.3   MCH 27.8 27.5 28.0   MCHC 35.6 33.2 34.4   RDW 13.1 13.2 13.1   Platelets 240 270 276   Neutrophil Rel % 76.8 (A) 70.3 67.0   Immature Granulocyte Rel % 0.3 0.3 0.2   Lymphocyte Rel % 9.3 (A) 16.8 (A) 20.1   Monocyte Rel % 10.6 10.6 10.2   Eosinophil Rel % 2.8 1.7 2.2   Basophil Rel % 0.2 0.3 0.3   (A) Abnormal value            Lipid Panel    Lipid Panel 10/5/22   Total Cholesterol 118   Triglycerides 69   HDL Cholesterol 53   VLDL Cholesterol 14   LDL Cholesterol  51   LDL/HDL Ratio 0.97           TSH    TSH 10/5/22 4/5/23   TSH 3.370 3.220         T4, Free (04/05/2023 08:31)  Iron level (04/05/2023 08:31)  Urine Drug Screen - Urine, Clean Catch (04/05/2023 08:31)       Assessment and Plan   Diagnoses and all orders for this  visit:    1. Encounter for annual wellness exam in Medicare patient (Primary)    2. Insomnia, unspecified type    3. Anxiety and depression    4. Hypothyroidism, unspecified type  -     T4, Free; Future  -     TSH; Future    5. Essential hypertension  -     Comprehensive Metabolic Panel; Future  -     CBC & Differential; Future    6. Iron deficiency anemia, unspecified iron deficiency anemia type    7. Vitamin D deficiency  -     Vitamin D,25-Hydroxy; Future    8. Elevated blood sugar  -     Hemoglobin A1c; Future      Medicare Wellness exam: Care gaps reviewed. Recommended vaccines.     Insomnia: Improved on zolpidem CR 12.5 mg nightly.  REENA and recent UDS reviewed.  Continue current treatment plan.      Anxiety and depression: Stable on  medication and to continue Paxil 10 mg daily.    Hypothyroidism: TSH and T4 normal continue on levothyroxine 25 mcg daily and to continue.    Hypertension: Blood pressure low today and patient has fatigue. Stop losartan-hctz 100mg/12.5mg 1/2 tab daily. Continue amlodipine 10 mg daily and follow-up with Dr. Mata at appointment in a few weeks.     Iron deficiency anemia: Iron level is normal. Monitor.        Follow Up   Return in about 6 months (around 10/10/2023) for Recheck.  Patient was given instructions and counseling regarding his condition or for health maintenance advice. Please see specific information pulled into the AVS if appropriate.

## 2023-04-19 DIAGNOSIS — N40.0 BENIGN PROSTATIC HYPERPLASIA WITHOUT LOWER URINARY TRACT SYMPTOMS: ICD-10-CM

## 2023-04-19 RX ORDER — TAMSULOSIN HYDROCHLORIDE 0.4 MG/1
1 CAPSULE ORAL DAILY
Qty: 90 CAPSULE | Refills: 3 | Status: SHIPPED | OUTPATIENT
Start: 2023-04-19 | End: 2024-04-13

## 2023-04-19 NOTE — TELEPHONE ENCOUNTER
Please notify patient that his [facility] and please schedule a 6 month follow up appointment with repeat psa level may be drawn at the visit. Thank you.

## 2023-04-24 DIAGNOSIS — G47.00 INSOMNIA, UNSPECIFIED TYPE: Chronic | ICD-10-CM

## 2023-04-24 RX ORDER — ZOLPIDEM TARTRATE 12.5 MG/1
12.5 TABLET, FILM COATED, EXTENDED RELEASE ORAL NIGHTLY PRN
Qty: 30 TABLET | Refills: 5 | Status: SHIPPED | OUTPATIENT
Start: 2023-04-24

## 2023-04-24 NOTE — TELEPHONE ENCOUNTER
Rx Refill Note  Requested Prescriptions     Pending Prescriptions Disp Refills   • zolpidem CR (AMBIEN CR) 12.5 MG CR tablet 30 tablet 5     Sig: Take 1 tablet by mouth At Night As Needed for Sleep.      Last office visit with prescribing clinician: 4/10/2023   Last telemedicine visit with prescribing clinician: Visit date not found   Next office visit with prescribing clinician: 10/23/2023                         Would you like a call back once the refill request has been completed: [] Yes [] No    If the office needs to give you a call back, can they leave a voicemail: [] Yes [] No    Babs Aguirre MA  04/24/23, 10:14 EDT

## 2023-04-24 NOTE — TELEPHONE ENCOUNTER
Caller: Reji Mcmillan    Relationship: Self    Best call back number: [phone]    Requested Prescriptions:   Requested Prescriptions     Pending Prescriptions Disp Refills   • zolpidem CR (AMBIEN CR) 12.5 MG CR tablet 30 tablet 5     Sig: Take 1 tablet by mouth At Night As Needed for Sleep.      PATIENT WOULD LIKE THIS MEDICATION TO GO TO [facility] THIS TIME AND THEN CONTINUE WITH [facility] FROM NOW ON.     Pharmacy where request should be sent: [facility]/PHARMACY #[id number] - [place] - [address]  [phone] [facility] [phone] FX     Last office visit with prescribing clinician: 4/10/2023   Last telemedicine visit with prescribing clinician: Visit date not found   Next office visit with prescribing clinician: 10/23/2023     Does the patient have less than a 3 day supply:  [x] Yes  [] No    Would you like a call back once the refill request has been completed: [] Yes [] No    If the office needs to give you a call back, can they leave a voicemail: [] Yes [] No    Lesvia Alex Rep   04/24/23 09:16 EDT

## 2023-04-26 RX ORDER — LANSOPRAZOLE 30 MG/1
CAPSULE, DELAYED RELEASE ORAL
Qty: 90 CAPSULE | Refills: 3 | Status: SHIPPED | OUTPATIENT
Start: 2023-04-26

## 2023-05-08 DIAGNOSIS — G47.00 INSOMNIA, UNSPECIFIED TYPE: Chronic | ICD-10-CM

## 2023-05-08 RX ORDER — ZOLPIDEM TARTRATE 12.5 MG/1
12.5 TABLET, FILM COATED, EXTENDED RELEASE ORAL NIGHTLY PRN
Qty: 30 TABLET | Refills: 5 | OUTPATIENT
Start: 2023-05-08

## 2023-05-08 NOTE — TELEPHONE ENCOUNTER
Caller: Reji Mcmillan    Relationship: Self    Best call back number: [phone]    Requested Prescriptions:   Requested Prescriptions     Pending Prescriptions Disp Refills   • zolpidem CR (AMBIEN CR) 12.5 MG CR tablet 30 tablet 5     Sig: Take 1 tablet by mouth At Night As Needed for Sleep.        Pharmacy where request should be sent: [facility] - [address] [phone] [facility] [phone]      Last office visit with prescribing clinician: 4/10/2023   Last telemedicine visit with prescribing clinician: 10/23/2023   Next office visit with prescribing clinician: 10/23/2023     Additional details provided by patient: PATIENT STATED THAT HE IS WANTING A 90 DAY SUPPLY FOR THIS REFILL.     Does the patient have less than a 3 day supply:  [] Yes  [x] No    Lesvia Rea Rep   05/08/23 11:24 EDT

## 2023-05-15 ENCOUNTER — OFFICE VISIT (OUTPATIENT)
Dept: CARDIOLOGY | Facility: CLINIC | Age: 78
End: 2023-05-15
Payer: MEDICARE

## 2023-05-15 VITALS
HEIGHT: 71 IN | DIASTOLIC BLOOD PRESSURE: 51 MMHG | HEART RATE: 65 BPM | SYSTOLIC BLOOD PRESSURE: 102 MMHG | BODY MASS INDEX: 26.18 KG/M2 | WEIGHT: 187 LBS

## 2023-05-15 DIAGNOSIS — I25.810 CORONARY ARTERY DISEASE INVOLVING CORONARY BYPASS GRAFT OF NATIVE HEART WITHOUT ANGINA PECTORIS: Primary | ICD-10-CM

## 2023-05-15 DIAGNOSIS — I10 ESSENTIAL HYPERTENSION: ICD-10-CM

## 2023-05-15 DIAGNOSIS — G47.00 INSOMNIA, UNSPECIFIED TYPE: Chronic | ICD-10-CM

## 2023-05-15 DIAGNOSIS — E78.2 MIXED HYPERLIPIDEMIA: ICD-10-CM

## 2023-05-15 PROCEDURE — 3074F SYST BP LT 130 MM HG: CPT | Performed by: INTERNAL MEDICINE

## 2023-05-15 PROCEDURE — 3078F DIAST BP <80 MM HG: CPT | Performed by: INTERNAL MEDICINE

## 2023-05-15 PROCEDURE — 99214 OFFICE O/P EST MOD 30 MIN: CPT | Performed by: INTERNAL MEDICINE

## 2023-05-15 RX ORDER — LOSARTAN POTASSIUM 25 MG/1
25 TABLET ORAL DAILY
Qty: 90 TABLET | Refills: 3 | Status: SHIPPED | OUTPATIENT
Start: 2023-05-15

## 2023-05-15 RX ORDER — AMLODIPINE BESYLATE 5 MG/1
5 TABLET ORAL DAILY
Qty: 90 TABLET | Refills: 3 | Status: SHIPPED | OUTPATIENT
Start: 2023-05-15

## 2023-05-15 NOTE — ASSESSMENT & PLAN NOTE
Well-controlled blood pressure today in the range on the slightly lower side though recommend decreasing his Norvasc to 5 mg once a day and his losartan 25 daily encourage patient to keep a home blood pressure log

## 2023-05-15 NOTE — PROGRESS NOTES
Chief Complaint  Follow-up, Coronary Artery Disease, and Hypertension    Subjective    Patient is doing well denies any anginal chest pain does have some dyspnea exertion is under increased a lot of stress due to taking care of his wife who has Alzheimer's  Past Medical History:   Diagnosis Date   • Allergic rhinitis due to allergen    • Benign prostatic hyperplasia    • CAD s/p CABG 03/30/2022   • Chest pain    • Chronic allergic rhinitis    • CKD (chronic kidney disease) stage 3, GFR 30-59 ml/min    • Eczema    • Erectile dysfunction 10/19/2020   • GERD (gastroesophageal reflux disease)    • Heart disease    • High blood pressure    • High cholesterol    • Hypercholesteremia    • Hypertension     Controlled   • Hypothyroidism    • Polio    • Prostate disorder          Current Outpatient Medications:   •  aspirin 81 MG EC tablet, Take 1 tablet by mouth Daily., Disp: , Rfl:   •  atorvastatin (LIPITOR) 80 MG tablet, Take 1 tablet by mouth Daily., Disp: 90 tablet, Rfl: 3  •  cetirizine (zyrTEC) 10 MG tablet, Take 1 tablet by mouth Daily., Disp: , Rfl:   •  Dupilumab (Dupixent) 300 MG/2ML solution pen-injector, Inject 1 mL under the skin into the appropriate area as directed Every 14 (Fourteen) Days., Disp: , Rfl:   •  lansoprazole (PREVACID) 30 MG capsule, TAKE 1 CAPSULE DAILY, Disp: 90 capsule, Rfl: 3  •  levothyroxine (SYNTHROID, LEVOTHROID) 25 MCG tablet, Take 1 tablet by mouth Daily., Disp: 90 tablet, Rfl: 3  •  PARoxetine (PAXIL) 10 MG tablet, Take 1 tablet by mouth Every Morning., Disp: 90 tablet, Rfl: 3  •  ranolazine (RANEXA) 500 MG 12 hr tablet, Take 1 tablet by mouth 2 (Two) Times a Day., Disp: 180 tablet, Rfl: 3  •  tamsulosin (FLOMAX) 0.4 MG capsule 24 hr capsule, Take 1 capsule by mouth Daily for 360 days. 30 minutes after dinner meal, Disp: 90 capsule, Rfl: 3  •  zolpidem CR (AMBIEN CR) 12.5 MG CR tablet, Take 1 tablet by mouth At Night As Needed for Sleep., Disp: 30 tablet, Rfl: 5  •  amLODIPine  "(NORVASC) 5 MG tablet, Take 1 tablet by mouth Daily., Disp: 90 tablet, Rfl: 3  •  losartan (Cozaar) 25 MG tablet, Take 1 tablet by mouth Daily., Disp: 90 tablet, Rfl: 3  •  triamcinolone (KENALOG) 0.1 % ointment, Apply 1 application topically to the appropriate area as directed 3 (Three) Times a Day As Needed. (Patient not taking: Reported on 5/15/2023), Disp: , Rfl:     Medications Discontinued During This Encounter   Medication Reason   • losartan-hydrochlorothiazide (Hyzaar) 100-25 MG per tablet *Therapy completed   • amLODIPine (NORVASC) 10 MG tablet      Allergies   Allergen Reactions   • Sulfa Antibiotics Unknown - Low Severity     Flu like symptoms caused from taking medication        Social History     Tobacco Use   • Smoking status: Never   • Smokeless tobacco: Never   Vaping Use   • Vaping Use: Never used   Substance Use Topics   • Alcohol use: Yes     Comment: drinks rarely; beer and liquor   • Drug use: Never       Family History   Problem Relation Age of Onset   • Hyperlipidemia Mother    • Heart disease Mother    • Hypertension Mother    • Hyperlipidemia Father    • Hypertension Father    • Heart attack Father    • Malig Hyperthermia Neg Hx         Objective     /51   Pulse 65   Ht 180.3 cm (71\")   Wt 84.8 kg (187 lb)   BMI 26.08 kg/m²       Physical Exam    General Appearance:   · no acute distress  · Alert and oriented x3  HENT:   · lips not cyanotic  · Atraumatic  Neck:  · No jvd   · supple  Respiratory:  · no respiratory distress  · normal breath sounds  · no rales  Cardiovascular:  · Regular rate and rhythm  · no S3, no S4   · no murmur  · no rub  Extremities  · No cyanosis  · lower extremity edema: none    Skin:   · warm, dry  · No rashes      Result Review :     No results found for: PROBNP  CMP        11/18/2022    10:06 3/21/2023    11:47 4/5/2023    08:31   CMP   Glucose 88   108   107     BUN 34   14   15     Creatinine 2.41   1.44   1.65     EGFR 27.1   50.4   42.8     Sodium 128  "  136   138     Potassium 3.8   3.9   3.8     Chloride 93   100   100     Calcium 9.2   9.4   9.9     Total Protein 7.1    7.4     Albumin 3.90   4.4   4.5     Globulin 3.2    2.9     Total Bilirubin 0.5    0.6     Alkaline Phosphatase 59    55     AST (SGOT) 23    17     ALT (SGPT) 23    18     Albumin/Globulin Ratio 1.2    1.6     BUN/Creatinine Ratio 14.1   9.7   9.1     Anion Gap 14.4   9.8   8.7       CBC w/diff        11/18/2022    10:06 3/21/2023    11:47 4/5/2023    08:31   CBC w/Diff   WBC 6.03   5.85   6.27     RBC 4.53   4.72   4.82     Hemoglobin 12.6   13.0   13.5     Hematocrit 35.4   39.1   39.2     MCV 78.1   82.8   81.3     MCH 27.8   27.5   28.0     MCHC 35.6   33.2   34.4     RDW 13.1   13.2   13.1     Platelets 240   270   276     Neutrophil Rel % 76.8   70.3   67.0     Immature Granulocyte Rel % 0.3   0.3   0.2     Lymphocyte Rel % 9.3   16.8   20.1     Monocyte Rel % 10.6   10.6   10.2     Eosinophil Rel % 2.8   1.7   2.2     Basophil Rel % 0.2   0.3   0.3        Lab Results   Component Value Date    TSH 3.220 04/05/2023      Lab Results   Component Value Date    FREET4 1.23 04/05/2023      No results found for: DDIMERQUANT  No results found for: MG   No results found for: DIGOXIN   No results found for: TROPONINT        Lipid Panel        10/5/2022    09:02   Lipid Panel   Total Cholesterol 118     Triglycerides 69     HDL Cholesterol 53     VLDL Cholesterol 14     LDL Cholesterol  51     LDL/HDL Ratio 0.97       No results found for: POCTROP                   Diagnoses and all orders for this visit:    1. CAD s/p CABG (Primary)  Assessment & Plan:  Patient is doing well no ongoing angina continue with chronic aspirin 81 mg daily.  Since patient is angina recommended he could try to come off of his Ranexa        2. Essential hypertension  Assessment & Plan:  Well-controlled blood pressure today in the range on the slightly lower side though recommend decreasing his Norvasc to 5 mg once a day  and his losartan 25 daily encourage patient to keep a home blood pressure log    Orders:  -     amLODIPine (NORVASC) 5 MG tablet; Take 1 tablet by mouth Daily.  Dispense: 90 tablet; Refill: 3  -     losartan (Cozaar) 25 MG tablet; Take 1 tablet by mouth Daily.  Dispense: 90 tablet; Refill: 3    3. Mixed hyperlipidemia  Assessment & Plan:  Continue with Lipitor 80 nightly LDL goal    Orders:  -     Lipid Panel; Future  -     Hepatic Function Panel; Future          Follow Up     Return in about 6 months (around 11/15/2023) for Follow with Varsha Jang, EKG with F/U.          Patient was given instructions and counseling regarding his condition or for health maintenance advice. Please see specific information pulled into the AVS if appropriate.

## 2023-05-15 NOTE — ASSESSMENT & PLAN NOTE
Patient is doing well no ongoing angina continue with chronic aspirin 81 mg daily.  Since patient is angina recommended he could try to come off of his Ranexa

## 2023-05-16 RX ORDER — ZOLPIDEM TARTRATE 12.5 MG/1
12.5 TABLET, FILM COATED, EXTENDED RELEASE ORAL NIGHTLY PRN
Qty: 30 TABLET | Refills: 5 | Status: SHIPPED | OUTPATIENT
Start: 2023-05-16 | End: 2023-05-22 | Stop reason: SDUPTHER

## 2023-05-18 DIAGNOSIS — G47.00 INSOMNIA, UNSPECIFIED TYPE: Chronic | ICD-10-CM

## 2023-05-18 NOTE — TELEPHONE ENCOUNTER
Caller: eRji Mcmillan    Relationship: Self    Best call back number: [phone]    Requested Prescriptions:   Requested Prescriptions     Pending Prescriptions Disp Refills   • zolpidem CR (AMBIEN CR) 12.5 MG CR tablet 30 tablet 5     Sig: Take 1 tablet by mouth At Night As Needed for Sleep.        Pharmacy where request should be sent: [facility] [address] [phone] [facility] [phone]      Last office visit with prescribing clinician: 4/10/2023   Last telemedicine visit with prescribing clinician: 5/15/2023   Next office visit with prescribing clinician: 10/23/2023     PATIENT IS REQUESTING A 90 DAY SUPPLY IF POSSIBLE. IT WOULD BE LESS EXPENSIVE FOR THE PATIENT.     Does the patient have less than a 3 day supply:  [] Yes  [] No    Would you like a call back once the refill request has been completed: [] Yes [] No    If the office needs to give you a call back, can they leave a voicemail: [] Yes [] No    Lesvia Alex Rep   05/18/23 08:34 EDT

## 2023-05-21 RX ORDER — ZOLPIDEM TARTRATE 12.5 MG/1
12.5 TABLET, FILM COATED, EXTENDED RELEASE ORAL NIGHTLY PRN
Qty: 30 TABLET | Refills: 5 | OUTPATIENT
Start: 2023-05-21

## 2023-05-22 ENCOUNTER — TELEPHONE (OUTPATIENT)
Dept: INTERNAL MEDICINE | Facility: CLINIC | Age: 78
End: 2023-05-22

## 2023-05-22 DIAGNOSIS — G47.00 INSOMNIA, UNSPECIFIED TYPE: Chronic | ICD-10-CM

## 2023-05-22 RX ORDER — ZOLPIDEM TARTRATE 12.5 MG/1
12.5 TABLET, FILM COATED, EXTENDED RELEASE ORAL NIGHTLY PRN
Qty: 30 TABLET | Refills: 5 | Status: SHIPPED | OUTPATIENT
Start: 2023-05-22

## 2023-05-22 NOTE — TELEPHONE ENCOUNTER
Caller: Reji Mcmillan    Relationship: Self    Best call back number: [phone]    What is the best time to reach you: ANYTIME    Who are you requesting to speak with (clinical staff, provider,  specific staff member): CLINICAL    What was the call regarding: THE PATIENT'S ZOLPIDEM WAS SENT TO [facility], THIS WAS SUPPOSED TO BE SENT TO [facility]. HE WOULD LIKE A CALL BACK TO CONFIRM THIS HAS BEEN CORRECTED. PREVIOUS REQUEST DENIED    Do you require a callback: YES

## 2023-08-09 ENCOUNTER — OFFICE VISIT (OUTPATIENT)
Dept: UROLOGY | Facility: CLINIC | Age: 78
End: 2023-08-09
Payer: MEDICARE

## 2023-08-09 VITALS
WEIGHT: 187 LBS | DIASTOLIC BLOOD PRESSURE: 59 MMHG | SYSTOLIC BLOOD PRESSURE: 125 MMHG | TEMPERATURE: 98.6 F | HEIGHT: 71 IN | BODY MASS INDEX: 26.18 KG/M2 | HEART RATE: 62 BPM

## 2023-08-09 DIAGNOSIS — N40.0 BENIGN PROSTATIC HYPERPLASIA WITHOUT LOWER URINARY TRACT SYMPTOMS: ICD-10-CM

## 2023-08-09 DIAGNOSIS — N48.6 PEYRONIE DISEASE: ICD-10-CM

## 2023-08-09 DIAGNOSIS — R97.20 ELEVATED PROSTATE SPECIFIC ANTIGEN (PSA): Primary | ICD-10-CM

## 2023-08-09 LAB
BILIRUB BLD-MCNC: NEGATIVE MG/DL
CLARITY, POC: CLEAR
COLOR UR: YELLOW
EXPIRATION DATE: NORMAL
GLUCOSE UR STRIP-MCNC: NEGATIVE MG/DL
KETONES UR QL: NEGATIVE
LEUKOCYTE EST, POC: NEGATIVE
Lab: NORMAL
NITRITE UR-MCNC: NEGATIVE MG/ML
PH UR: 6 [PH] (ref 5–8)
PROT UR STRIP-MCNC: NEGATIVE MG/DL
PSA SERPL-MCNC: 3.92 NG/ML (ref 0–4)
RBC # UR STRIP: NEGATIVE /UL
SP GR UR: 1.02 (ref 1–1.03)
UROBILINOGEN UR QL: NORMAL

## 2023-08-09 PROCEDURE — 3074F SYST BP LT 130 MM HG: CPT | Performed by: UROLOGY

## 2023-08-09 PROCEDURE — 84153 ASSAY OF PSA TOTAL: CPT | Performed by: UROLOGY

## 2023-08-09 PROCEDURE — 81003 URINALYSIS AUTO W/O SCOPE: CPT | Performed by: UROLOGY

## 2023-08-09 PROCEDURE — 1160F RVW MEDS BY RX/DR IN RCRD: CPT | Performed by: UROLOGY

## 2023-08-09 PROCEDURE — 3078F DIAST BP <80 MM HG: CPT | Performed by: UROLOGY

## 2023-08-09 PROCEDURE — 99214 OFFICE O/P EST MOD 30 MIN: CPT | Performed by: UROLOGY

## 2023-08-09 PROCEDURE — 1159F MED LIST DOCD IN RCRD: CPT | Performed by: UROLOGY

## 2023-08-09 NOTE — PROGRESS NOTES
"Chief Complaint  Elevated PSA    Subjective          Reji Mcmillan presents to [facility]  History of Present Illness    Patient came in for evaluation of his Peyronie's disease.  This is a new problem which he has developed starting about 1 year ago.  For last 6 months his penis is curving up and the degree of curvature is more than 30 degree.  6 months ago he felt a knot in the penis at the base.  Patient is having pain during erection and does not have any pain without.  Patient has used Trimix for Erection sometimes ago.  He continues to have retrograde ejaculation.  Patient gets pain in this plaque during the erection.    Patient also has elevated PSA but it is better than it used to be about 6 months ago.  Patient has no dysuria or gross hematuria.    PSA on 1/26/2023 is 5.510.  PSA on 11/22/2022 was 16.70.    Objective no acute distress  Vital Signs:   /59   Pulse 62   Temp 98.6 øF (37 øC)   Ht 180.3 cm (71\")   Wt 84.8 kg (187 lb)   BMI 26.08 kg/mý     Allergies   Allergen Reactions    Sulfa Antibiotics Unknown - Low Severity     Flu like symptoms caused from taking medication      Past medical history:  has a past medical history of Allergic rhinitis due to allergen, Benign prostatic hyperplasia, CAD s/p CABG (03/30/2022), Chest pain, Chronic allergic rhinitis, CKD (chronic kidney disease) stage 3, GFR 30-59 ml/min, Eczema, Erectile dysfunction (10/19/2020), GERD (gastroesophageal reflux disease), Heart disease, High blood pressure, High cholesterol, Hypercholesteremia, Hypertension, Hypothyroidism, Polio, and Prostate disorder.   Past surgical history:  has a past surgical history that includes Cardiac surgery; Cholecystectomy; Colonoscopy; Coronary artery bypass graft; Hernia repair; Dental surgery; Toe Surgery (Left); and Colonoscopy (N/A, 3/20/2023).  Personal history: family history includes Heart attack in his father; Heart disease in his mother; Hyperlipidemia " in his father and mother; Hypertension in his father and mother.  Social history:  reports that he has never smoked. He has never used smokeless tobacco. He reports current alcohol use. He reports that he does not use drugs.    Review of Systems    Please see past medical and surgical history rest of the system is negative.    Physical Exam  Constitutional:       General: He is not in acute distress.     Appearance: Normal appearance. He is normal weight. He is not ill-appearing or toxic-appearing.   HENT:      Head: Normocephalic and atraumatic.      Ears:      Comments: No hearing loss  Abdominal:      Palpations: Abdomen is soft. There is no mass.      Tenderness: There is no abdominal tenderness. There is no right CVA tenderness or left CVA tenderness.      Hernia: No hernia is present.   Genitourinary:     Comments: Patient has large plaque at the base of the penis which is almost 4 cm time 4 cm but then is smaller plaque extends from this plaque all the way to corona.  It is nontender on palpation.  Long plaque is just about 6 cm time 2 cm.  Patient has deformity with curvature going anteriorly and to the slight left.  Curvature is more than 35 degree.    Right and left scrotum is normal    Right and left testicle is normal.  Right and left epididymis is normal.    FLY.  Prostate gland is just about 80 g and feels benign  Musculoskeletal:         General: Normal range of motion.      Right lower leg: No edema.      Left lower leg: No edema.   Skin:     General: Skin is warm.      Coloration: Skin is not jaundiced.   Neurological:      General: No focal deficit present.      Mental Status: He is alert and oriented to person, place, and time.      Motor: No weakness.      Gait: Gait normal.   Psychiatric:         Mood and Affect: Mood normal.         Behavior: Behavior normal.         Thought Content: Thought content normal.         Judgment: Judgment normal.      Result Review :                 Assessment and  Plan    Diagnoses and all orders for this visit:    1. Elevated prostate specific antigen (PSA) (Primary)  -     POC Urinalysis Dipstick, Automated    2. Benign prostatic hyperplasia without lower urinary tract symptoms  -     POC Urinalysis Dipstick, Automated    3. Peyronie disease    I think his PSA is elevated because of enlarged prostate.    Peyronie's disease is pretty discomforting to the patient and he is requesting treatment with Xiaflex.  Will get it authorized and starting with the treatment.  I have discussed with him about cycles of treatment so he will probably need 4 cycles before he will improve.     Brief Urine Lab Results  (Last result in the past 365 days)        Color   Clarity   Blood   Leuk Est   Nitrite   Protein   CREAT   Urine HCG        08/09/23 1027 Yellow   Clear   Negative   Negative   Negative   Negative                    Follow Up   No follow-ups on file.  Patient was given instructions and counseling regarding his condition or for health maintenance advice. Please see specific information pulled into the AVS if appropriate.     Mellisa Montes MD

## 2023-08-23 ENCOUNTER — TELEPHONE (OUTPATIENT)
Dept: UROLOGY | Facility: CLINIC | Age: 78
End: 2023-08-23
Payer: MEDICARE

## 2023-08-23 NOTE — TELEPHONE ENCOUNTER
Spoke with patient he is aware that xiaflex is slow process and we have not heard from from [facility].

## 2023-09-13 ENCOUNTER — PROCEDURE VISIT (OUTPATIENT)
Dept: UROLOGY | Facility: CLINIC | Age: 78
End: 2023-09-13
Payer: MEDICARE

## 2023-09-13 DIAGNOSIS — N48.6 PEYRONIE DISEASE: Primary | ICD-10-CM

## 2023-09-13 NOTE — PROGRESS NOTES
Procedures    Reconstitution of Xiaflex was done using sterile diluent consisting of Calcium Chloride Dihydrate in Normal Saline.  Penis was prepped with Chlorhexidine and then sterilely draped.  Using a 30 gauge needle, 1% Xylocaine was injected under the the skin overlying the plaque.  The medial wall of the plaque was injected and the solution slowly released while withdrawing the needle in a transverse manner.  Pressure was applied to the injection site and hemostasis was achieved.    Used all the injection.

## 2023-09-15 ENCOUNTER — PROCEDURE VISIT (OUTPATIENT)
Dept: UROLOGY | Facility: CLINIC | Age: 78
End: 2023-09-15
Payer: MEDICARE

## 2023-09-15 DIAGNOSIS — R97.20 ELEVATED PROSTATE SPECIFIC ANTIGEN (PSA): Primary | ICD-10-CM

## 2023-09-15 DIAGNOSIS — N48.6 PEYRONIE DISEASE: ICD-10-CM

## 2023-09-15 NOTE — PROGRESS NOTES
Procedures    77-year-old white male has Peyronie's disease.  Plaque is right at the base of the penis.    We injected at the base of penis    Reconstitution of Xiaflex was done using sterile diluent consisting of Calcium Chloride Dihydrate in Normal Saline.  Penis was prepped with Chlorhexidine and then sterilely draped.  Using a 30 gauge needle, 1% Xylocaine was injected under the the skin overlying the plaque.  The medial wall of the plaque was injected and the solution slowly released while withdrawing the needle in a transverse manner.  Pressure was applied to the injection site and hemostasis was achieved.

## 2023-09-19 ENCOUNTER — LAB (OUTPATIENT)
Dept: LAB | Facility: HOSPITAL | Age: 78
End: 2023-09-19
Payer: MEDICARE

## 2023-09-19 ENCOUNTER — TRANSCRIBE ORDERS (OUTPATIENT)
Dept: LAB | Facility: HOSPITAL | Age: 78
End: 2023-09-19
Payer: MEDICARE

## 2023-09-19 DIAGNOSIS — N18.32 CHRONIC KIDNEY DISEASE (CKD) STAGE G3B/A1, MODERATELY DECREASED GLOMERULAR FILTRATION RATE (GFR) BETWEEN 30-44 ML/MIN/1.73 SQUARE METER AND ALBUMINURIA CREATININE RATIO LESS THAN 30 MG/G (CMS/H*: Primary | ICD-10-CM

## 2023-09-19 DIAGNOSIS — N18.32 CHRONIC KIDNEY DISEASE (CKD) STAGE G3B/A1, MODERATELY DECREASED GLOMERULAR FILTRATION RATE (GFR) BETWEEN 30-44 ML/MIN/1.73 SQUARE METER AND ALBUMINURIA CREATININE RATIO LESS THAN 30 MG/G (CMS/H*: ICD-10-CM

## 2023-09-19 DIAGNOSIS — E55.9 VITAMIN D DEFICIENCY: ICD-10-CM

## 2023-09-19 LAB
25(OH)D3 SERPL-MCNC: 27.7 NG/ML (ref 30–100)
ALBUMIN SERPL-MCNC: 4.2 G/DL (ref 3.5–5.2)
ANION GAP SERPL CALCULATED.3IONS-SCNC: 9.3 MMOL/L (ref 5–15)
BASOPHILS # BLD AUTO: 0.02 10*3/MM3 (ref 0–0.2)
BASOPHILS NFR BLD AUTO: 0.3 % (ref 0–1.5)
BILIRUB UR QL STRIP: NEGATIVE
BUN SERPL-MCNC: 19 MG/DL (ref 8–23)
BUN/CREAT SERPL: 13.6 (ref 7–25)
CALCIUM SPEC-SCNC: 8.8 MG/DL (ref 8.6–10.5)
CHLORIDE SERPL-SCNC: 102 MMOL/L (ref 98–107)
CLARITY UR: CLEAR
CO2 SERPL-SCNC: 24.7 MMOL/L (ref 22–29)
COLOR UR: YELLOW
CREAT SERPL-MCNC: 1.4 MG/DL (ref 0.76–1.27)
CREAT UR-MCNC: 88.2 MG/DL
DEPRECATED RDW RBC AUTO: 41.6 FL (ref 37–54)
EGFRCR SERPLBLD CKD-EPI 2021: 52.1 ML/MIN/1.73
EOSINOPHIL # BLD AUTO: 0.12 10*3/MM3 (ref 0–0.4)
EOSINOPHIL NFR BLD AUTO: 1.9 % (ref 0.3–6.2)
ERYTHROCYTE [DISTWIDTH] IN BLOOD BY AUTOMATED COUNT: 14.1 % (ref 12.3–15.4)
GLUCOSE SERPL-MCNC: 99 MG/DL (ref 65–99)
GLUCOSE UR STRIP-MCNC: NEGATIVE MG/DL
HCT VFR BLD AUTO: 37.5 % (ref 37.5–51)
HGB BLD-MCNC: 12.9 G/DL (ref 13–17.7)
HGB UR QL STRIP.AUTO: NEGATIVE
IMM GRANULOCYTES # BLD AUTO: 0.04 10*3/MM3 (ref 0–0.05)
IMM GRANULOCYTES NFR BLD AUTO: 0.6 % (ref 0–0.5)
KETONES UR QL STRIP: NEGATIVE
LEUKOCYTE ESTERASE UR QL STRIP.AUTO: NEGATIVE
LYMPHOCYTES # BLD AUTO: 1.1 10*3/MM3 (ref 0.7–3.1)
LYMPHOCYTES NFR BLD AUTO: 17.7 % (ref 19.6–45.3)
MCH RBC QN AUTO: 28.2 PG (ref 26.6–33)
MCHC RBC AUTO-ENTMCNC: 34.4 G/DL (ref 31.5–35.7)
MCV RBC AUTO: 82.1 FL (ref 79–97)
MONOCYTES # BLD AUTO: 0.56 10*3/MM3 (ref 0.1–0.9)
MONOCYTES NFR BLD AUTO: 9 % (ref 5–12)
NEUTROPHILS NFR BLD AUTO: 4.36 10*3/MM3 (ref 1.7–7)
NEUTROPHILS NFR BLD AUTO: 70.5 % (ref 42.7–76)
NITRITE UR QL STRIP: NEGATIVE
NRBC BLD AUTO-RTO: 0 /100 WBC (ref 0–0.2)
PH UR STRIP.AUTO: 5.5 [PH] (ref 5–8)
PHOSPHATE SERPL-MCNC: 2.6 MG/DL (ref 2.5–4.5)
PLATELET # BLD AUTO: 249 10*3/MM3 (ref 140–450)
PMV BLD AUTO: 10.5 FL (ref 6–12)
POTASSIUM SERPL-SCNC: 4.3 MMOL/L (ref 3.5–5.2)
PROT ?TM UR-MCNC: 8.7 MG/DL
PROT UR QL STRIP: NEGATIVE
PROT/CREAT UR: 0.1 MG/G{CREAT}
PTH-INTACT SERPL-MCNC: 42.3 PG/ML (ref 15–65)
RBC # BLD AUTO: 4.57 10*6/MM3 (ref 4.14–5.8)
SODIUM SERPL-SCNC: 136 MMOL/L (ref 136–145)
SP GR UR STRIP: 1.01 (ref 1–1.03)
UROBILINOGEN UR QL STRIP: NORMAL
WBC NRBC COR # BLD: 6.2 10*3/MM3 (ref 3.4–10.8)

## 2023-09-19 PROCEDURE — 85025 COMPLETE CBC W/AUTO DIFF WBC: CPT

## 2023-09-19 PROCEDURE — 82570 ASSAY OF URINE CREATININE: CPT

## 2023-09-19 PROCEDURE — 36415 COLL VENOUS BLD VENIPUNCTURE: CPT

## 2023-09-19 PROCEDURE — 80069 RENAL FUNCTION PANEL: CPT

## 2023-09-19 PROCEDURE — 83970 ASSAY OF PARATHORMONE: CPT

## 2023-09-19 PROCEDURE — 82306 VITAMIN D 25 HYDROXY: CPT

## 2023-09-19 PROCEDURE — 84156 ASSAY OF PROTEIN URINE: CPT

## 2023-09-19 PROCEDURE — 81003 URINALYSIS AUTO W/O SCOPE: CPT

## 2023-10-02 RX ORDER — LEVOTHYROXINE SODIUM 0.03 MG/1
TABLET ORAL
Qty: 90 TABLET | Refills: 3 | Status: SHIPPED | OUTPATIENT
Start: 2023-10-02

## 2023-10-12 RX ORDER — PAROXETINE 10 MG/1
10 TABLET, FILM COATED ORAL EVERY MORNING
Qty: 90 TABLET | Refills: 3 | Status: SHIPPED | OUTPATIENT
Start: 2023-10-12

## 2023-10-13 ENCOUNTER — TELEPHONE (OUTPATIENT)
Dept: UROLOGY | Facility: CLINIC | Age: 78
End: 2023-10-13

## 2023-10-13 NOTE — TELEPHONE ENCOUNTER
Caller: [facility]    Relationship to patient: PHARMACY    Best call back number: [phone]    Patient is needing: [facility] CALLED LOOKING TO SPEAK WITH RAMON ABOUT MEDICATION DELIVERY FOR THIS PT.

## 2023-10-20 ENCOUNTER — PROCEDURE VISIT (OUTPATIENT)
Dept: UROLOGY | Facility: CLINIC | Age: 78
End: 2023-10-20
Payer: MEDICARE

## 2023-10-20 DIAGNOSIS — N48.6 PEYRONIE DISEASE: Primary | ICD-10-CM

## 2023-10-20 NOTE — PROGRESS NOTES
Procedures    This is the first treatment of second cycle.  Patient penis was bending on the left side but now it is pointing up.  There is a large plaque almost 3 cm time 2 cm right at the base of penis and this will be injected today.    Reconstitution of Xiaflex was done using sterile diluent consisting of Calcium Chloride Dihydrate in Normal Saline.  Penis was prepped with Chlorhexidine and then sterilely draped.  Using a 30 gauge needle, 1% Xylocaine was injected under the the skin overlying the plaque.  The medial wall of the plaque was injected and the solution slowly released while withdrawing the needle in a transverse manner.  Pressure was applied to the injection site and hemostasis was achieved.

## 2023-10-22 NOTE — PROGRESS NOTES
Chief Complaint  Follow-up (6 month ), Hypertension, and Hypothyroidism  Subjective    History of Present Illness  Reji Mcmillan is a 78 y.o. male  presents to [facility] for follow-up of insomnia, anxiety, hypothyroidism and hypertension.  Sleeps all night on the Ambien CD.  Negative for chest pain, shortness of air on exertion and heart palpitations.  Positive for fatigue and occasional dizziness with standing. Recent labs reviewed.     Specialist include:  Dr. Gutiérrez for CKD-no medication changes, urology for prostate and Dr. Mata for CAD and hyperlipidemia.     Past Medical History:   Diagnosis Date    Allergic rhinitis due to allergen     Benign prostatic hyperplasia     CAD s/p CABG 03/30/2022    Chest pain     Chronic allergic rhinitis     CKD (chronic kidney disease) stage 3, GFR 30-59 ml/min     Eczema     Erectile dysfunction 10/19/2020    GERD (gastroesophageal reflux disease)     Heart disease     High blood pressure     High cholesterol     Hypercholesteremia     Hypertension     Controlled    Hypothyroidism     Polio     Prostate disorder         Past Surgical History:   Procedure Laterality Date    CARDIAC SURGERY      CHOLECYSTECTOMY      COLONOSCOPY      COLONOSCOPY N/A 3/20/2023    Procedure: COLONOSCOPY;  Surgeon: Jacques Dc MD;  Location: [facility] ENDOSCOPY;  Service: General;  Laterality: N/A;  diverticulosis, hemorrhoids    CORONARY ARTERY BYPASS GRAFT      cabbage x2    DENTAL PROCEDURE      HERNIA REPAIR      TOE SURGERY Left         Allergies   Allergen Reactions    Sulfa Antibiotics Unknown - Low Severity     Flu like symptoms caused from taking medication          Current Outpatient Medications:     amLODIPine (NORVASC) 5 MG tablet, Take 1 tablet by mouth Daily., Disp: 90 tablet, Rfl: 3    aspirin 81 MG EC tablet, Take 1 tablet by mouth Daily., Disp: , Rfl:     atorvastatin (LIPITOR) 80 MG tablet, Take 1 tablet by mouth Daily., Disp: 90 tablet,  "Rfl: 3    cetirizine (zyrTEC) 10 MG tablet, Take 1 tablet by mouth Daily., Disp: , Rfl:     Cholecalciferol 50 MCG (2000 UT) capsule, Daily., Disp: , Rfl:     Dupilumab (Dupixent) 300 MG/2ML solution pen-injector, Inject 1 mL under the skin into the appropriate area as directed Every 14 (Fourteen) Days., Disp: , Rfl:     lansoprazole (PREVACID) 30 MG capsule, TAKE 1 CAPSULE DAILY, Disp: 90 capsule, Rfl: 3    levothyroxine (SYNTHROID, LEVOTHROID) 25 MCG tablet, TAKE 1 TABLET DAILY, Disp: 90 tablet, Rfl: 3    losartan (Cozaar) 25 MG tablet, Take 1 tablet by mouth Daily., Disp: 90 tablet, Rfl: 3    losartan-hydrochlorothiazide (HYZAAR) 100-12.5 MG per tablet, Daily., Disp: , Rfl:     PARoxetine (PAXIL) 10 MG tablet, TAKE 1 TABLET EVERY MORNING, Disp: 90 tablet, Rfl: 3    ranolazine (RANEXA) 500 MG 12 hr tablet, Take 1 tablet by mouth 2 (Two) Times a Day., Disp: 180 tablet, Rfl: 3    tamsulosin (FLOMAX) 0.4 MG capsule 24 hr capsule, Take 1 capsule by mouth Daily for 360 days. 30 minutes after dinner meal, Disp: 90 capsule, Rfl: 3    Xiaflex 0.9 MG injection, , Disp: , Rfl:     zolpidem CR (AMBIEN CR) 12.5 MG CR tablet, Take 1 tablet by mouth At Night As Needed for Sleep., Disp: 30 tablet, Rfl: 5    Daridorexant HCl (Quviviq) 50 MG tablet, Take 1 tablet by mouth every night at bedtime., Disp: 30 tablet, Rfl: 5    ketoconazole (NIZORAL) 2 % cream, Apply 1 application  topically to the appropriate area as directed Daily., Disp: 30 g, Rfl: 1    Objective   /74   Pulse 54   Temp 97.3 °F (36.3 °C)   Ht 180.3 cm (71\")   Wt 83 kg (183 lb)   SpO2 96%   BMI 25.52 kg/m²    Estimated body mass index is 25.52 kg/m² as calculated from the following:    Height as of this encounter: 180.3 cm (71\").    Weight as of this encounter: 83 kg (183 lb).   Physical Exam  Vitals reviewed.   Constitutional:       General: He is not in acute distress.  HENT:      Head: Normocephalic and atraumatic.   Neck:      Comments: No thyroid " enlargement  Cardiovascular:      Rate and Rhythm: Normal rate and regular rhythm.   Pulmonary:      Effort: Pulmonary effort is normal.      Breath sounds: Normal breath sounds. No wheezing, rhonchi or rales.   Musculoskeletal:      Right lower leg: No edema.      Left lower leg: No edema.   Lymphadenopathy:      Cervical: No cervical adenopathy.   Skin:     General: Skin is warm and dry.   Neurological:      General: No focal deficit present.      Mental Status: He is alert.   Psychiatric:         Thought Content: Thought content normal.        Result Review :  The following data was reviewed by: CARSON Gleason on 10/23/2023:  Common labs          4/5/2023    08:31 8/9/2023    15:45 9/19/2023    11:36   Common Labs   Glucose 107   99    BUN 15   19    Creatinine 1.65   1.40    Sodium 138   136    Potassium 3.8   4.3    Chloride 100   102    Calcium 9.9   8.8    Albumin 4.5   4.2    Total Bilirubin 0.6      Alkaline Phosphatase 55      AST (SGOT) 17      ALT (SGPT) 18      WBC 6.27   6.20    Hemoglobin 13.5   12.9    Hematocrit 39.2   37.5    Platelets 276   249    PSA  3.920       TSH          4/5/2023    08:31   TSH   TSH 3.220    Vitamin D,25-Hydroxy (09/19/2023 11:36)               Assessment and Plan   Diagnoses and all orders for this visit:    1. Insomnia, unspecified type (Primary)  -     Daridorexant HCl (Quviviq) 50 MG tablet; Take 1 tablet by mouth every night at bedtime.  Dispense: 30 tablet; Refill: 5    2. Anxiety and depression    3. Hypothyroidism, unspecified type  -     T4, Free; Future  -     TSH; Future    4. Essential hypertension  -     Comprehensive Metabolic Panel; Future  -     CBC & Differential; Future  -     Lipid Panel; Future  -     Vitamin B12; Future  -     Folate; Future  -     Magnesium; Future    5. Iron deficiency anemia, unspecified iron deficiency anemia type    6. Vitamin D deficiency  -     Vitamin D,25-Hydroxy; Future    7. Elevated blood sugar  -     Hemoglobin A1c;  Future    8. Tinea cruris    Other orders  -     ketoconazole (NIZORAL) 2 % cream; Apply 1 application  topically to the appropriate area as directed Daily.  Dispense: 30 g; Refill: 1       Insomnia: Improved on zolpidem CR 12.5 mg nightly.  REENA and recent UDS reviewed.  The patient will do a trial of Quviviq 50 mg qhs and sent to specialty pharmacy.  If he is able to get the medication and it improves his sleep he continue it but if not he will go back to his zolpidem CR.     Anxiety and depression: Stable on  medication and to continue Paxil 10 mg daily.     Hypothyroidism: TSH and T4 normal continue on levothyroxine 25 mcg daily and to continue.     Hypertension: Stable on amlodipine 10 mg daily.     Iron deficiency anemia: Hgb 12.9.  Monitor.     Vitamin D deficiency: Level is low. He is now taking vitamin D 2000 units a day.     Elevated blood sugar: Monitor.     Tinea Aj: Ketoconazole 2% cream apply to affected area daily.     Patient was given instructions and counseling regarding his condition or for health maintenance advice. Please see specific information pulled into the AVS if appropriate.     Follow Up   Return in about 6 months (around 4/23/2024) for Medicare Wellness.    Dictated Utilizing Dragon Dictation.  Please note that portions of this note were completed with a voice recognition program.  Part of this note may be an electronic transcription/translation of spoken language to printed text using the Dragon Dictation System.    CARSON Gleason

## 2023-10-23 ENCOUNTER — OFFICE VISIT (OUTPATIENT)
Dept: INTERNAL MEDICINE | Facility: CLINIC | Age: 78
End: 2023-10-23
Payer: MEDICARE

## 2023-10-23 ENCOUNTER — PROCEDURE VISIT (OUTPATIENT)
Dept: UROLOGY | Facility: CLINIC | Age: 78
End: 2023-10-23
Payer: MEDICARE

## 2023-10-23 VITALS
HEIGHT: 71 IN | SYSTOLIC BLOOD PRESSURE: 118 MMHG | OXYGEN SATURATION: 96 % | DIASTOLIC BLOOD PRESSURE: 74 MMHG | BODY MASS INDEX: 25.62 KG/M2 | TEMPERATURE: 97.3 F | HEART RATE: 54 BPM | WEIGHT: 183 LBS

## 2023-10-23 DIAGNOSIS — I10 ESSENTIAL HYPERTENSION: ICD-10-CM

## 2023-10-23 DIAGNOSIS — E55.9 VITAMIN D DEFICIENCY: ICD-10-CM

## 2023-10-23 DIAGNOSIS — Z23 FLU VACCINE NEED: ICD-10-CM

## 2023-10-23 DIAGNOSIS — R73.9 ELEVATED BLOOD SUGAR: ICD-10-CM

## 2023-10-23 DIAGNOSIS — F41.9 ANXIETY AND DEPRESSION: ICD-10-CM

## 2023-10-23 DIAGNOSIS — D50.9 IRON DEFICIENCY ANEMIA, UNSPECIFIED IRON DEFICIENCY ANEMIA TYPE: ICD-10-CM

## 2023-10-23 DIAGNOSIS — E03.9 HYPOTHYROIDISM, UNSPECIFIED TYPE: ICD-10-CM

## 2023-10-23 DIAGNOSIS — B35.6 TINEA CRURIS: ICD-10-CM

## 2023-10-23 DIAGNOSIS — F32.A ANXIETY AND DEPRESSION: ICD-10-CM

## 2023-10-23 DIAGNOSIS — G47.00 INSOMNIA, UNSPECIFIED TYPE: Primary | ICD-10-CM

## 2023-10-23 DIAGNOSIS — N48.6 PEYRONIE DISEASE: Primary | ICD-10-CM

## 2023-10-23 PROCEDURE — 54200 INJECTION PX PEYRONIE DS: CPT | Performed by: UROLOGY

## 2023-10-23 RX ORDER — KETOCONAZOLE 20 MG/G
1 CREAM TOPICAL DAILY
Qty: 30 G | Refills: 1 | Status: SHIPPED | OUTPATIENT
Start: 2023-10-23

## 2023-10-23 RX ORDER — DARIDOREXANT 50 MG/1
50 TABLET, FILM COATED ORAL
Qty: 30 TABLET | Refills: 5 | Status: SHIPPED | OUTPATIENT
Start: 2023-10-23

## 2023-10-23 RX ORDER — RANOLAZINE 500 MG/1
500 TABLET, EXTENDED RELEASE ORAL 2 TIMES DAILY
Qty: 180 TABLET | Refills: 0 | Status: SHIPPED | OUTPATIENT
Start: 2023-10-23

## 2023-10-23 RX ORDER — COLLAGENASE CLOSTRIDIUM HISTOLYTICUM 0.9 MG
KIT INJECTION
COMMUNITY
Start: 2023-10-12

## 2023-10-23 RX ORDER — LOSARTAN POTASSIUM AND HYDROCHLOROTHIAZIDE 12.5; 1 MG/1; MG/1
TABLET ORAL EVERY 24 HOURS
COMMUNITY

## 2023-10-23 NOTE — PROGRESS NOTES
Procedures    This is the second treatment of second cycle of Xiaflex treatment.  We injected in the same large plaque at the base of the penis.    Reconstitution of Xiaflex was done using sterile diluent consisting of Calcium Chloride Dihydrate in Normal Saline.  Penis was prepped with Chlorhexidine and then sterilely draped.  Using a 30 gauge needle, 1% Xylocaine was injected under the the skin overlying the plaque.  The medial wall of the plaque was injected and the solution slowly released while withdrawing the needle in a transverse manner.  Pressure was applied to the injection site and hemostasis was achieved.

## 2023-11-02 ENCOUNTER — LAB (OUTPATIENT)
Dept: LAB | Facility: HOSPITAL | Age: 78
End: 2023-11-02
Payer: MEDICARE

## 2023-11-02 DIAGNOSIS — E03.9 HYPOTHYROIDISM, UNSPECIFIED TYPE: ICD-10-CM

## 2023-11-02 DIAGNOSIS — R73.09 ABNORMAL BLOOD SUGAR: ICD-10-CM

## 2023-11-02 DIAGNOSIS — E03.9 HYPOTHYROIDISM, UNSPECIFIED TYPE: Primary | ICD-10-CM

## 2023-11-02 DIAGNOSIS — E55.9 VITAMIN D DEFICIENCY: ICD-10-CM

## 2023-11-02 DIAGNOSIS — E78.2 MIXED HYPERLIPIDEMIA: ICD-10-CM

## 2023-11-02 DIAGNOSIS — I10 ESSENTIAL HYPERTENSION: ICD-10-CM

## 2023-11-02 DIAGNOSIS — R73.9 ELEVATED BLOOD SUGAR: ICD-10-CM

## 2023-11-02 DIAGNOSIS — D64.9 ANEMIA, UNSPECIFIED TYPE: ICD-10-CM

## 2023-11-02 DIAGNOSIS — D50.9 IRON DEFICIENCY ANEMIA, UNSPECIFIED IRON DEFICIENCY ANEMIA TYPE: ICD-10-CM

## 2023-11-02 LAB
25(OH)D3 SERPL-MCNC: 27.6 NG/ML (ref 30–100)
ALBUMIN SERPL-MCNC: 3.8 G/DL (ref 3.5–5.2)
ALBUMIN/GLOB SERPL: 1.3 G/DL
ALP SERPL-CCNC: 69 U/L (ref 39–117)
ALT SERPL W P-5'-P-CCNC: 11 U/L (ref 1–41)
ANION GAP SERPL CALCULATED.3IONS-SCNC: 11.3 MMOL/L (ref 5–15)
AST SERPL-CCNC: 14 U/L (ref 1–40)
BASOPHILS # BLD AUTO: 0.02 10*3/MM3 (ref 0–0.2)
BASOPHILS NFR BLD AUTO: 0.3 % (ref 0–1.5)
BILIRUB CONJ SERPL-MCNC: <0.2 MG/DL (ref 0–0.3)
BILIRUB SERPL-MCNC: 0.5 MG/DL (ref 0–1.2)
BUN SERPL-MCNC: 15 MG/DL (ref 8–23)
BUN/CREAT SERPL: 10.9 (ref 7–25)
CALCIUM SPEC-SCNC: 8.9 MG/DL (ref 8.6–10.5)
CHLORIDE SERPL-SCNC: 102 MMOL/L (ref 98–107)
CHOLEST SERPL-MCNC: 113 MG/DL (ref 0–200)
CO2 SERPL-SCNC: 25.7 MMOL/L (ref 22–29)
CREAT SERPL-MCNC: 1.38 MG/DL (ref 0.76–1.27)
DEPRECATED RDW RBC AUTO: 38.6 FL (ref 37–54)
EGFRCR SERPLBLD CKD-EPI 2021: 52.7 ML/MIN/1.73
EOSINOPHIL # BLD AUTO: 0.21 10*3/MM3 (ref 0–0.4)
EOSINOPHIL NFR BLD AUTO: 3.4 % (ref 0.3–6.2)
ERYTHROCYTE [DISTWIDTH] IN BLOOD BY AUTOMATED COUNT: 13 % (ref 12.3–15.4)
GLOBULIN UR ELPH-MCNC: 3 GM/DL
GLUCOSE SERPL-MCNC: 96 MG/DL (ref 65–99)
HBA1C MFR BLD: 5.5 % (ref 4.8–5.6)
HCT VFR BLD AUTO: 36.8 % (ref 37.5–51)
HDLC SERPL-MCNC: 51 MG/DL (ref 40–60)
HGB BLD-MCNC: 12.3 G/DL (ref 13–17.7)
IMM GRANULOCYTES # BLD AUTO: 0.03 10*3/MM3 (ref 0–0.05)
IMM GRANULOCYTES NFR BLD AUTO: 0.5 % (ref 0–0.5)
LDLC SERPL CALC-MCNC: 52 MG/DL (ref 0–100)
LDLC/HDLC SERPL: 1.07 {RATIO}
LYMPHOCYTES # BLD AUTO: 0.99 10*3/MM3 (ref 0.7–3.1)
LYMPHOCYTES NFR BLD AUTO: 16.2 % (ref 19.6–45.3)
MCH RBC QN AUTO: 27.5 PG (ref 26.6–33)
MCHC RBC AUTO-ENTMCNC: 33.4 G/DL (ref 31.5–35.7)
MCV RBC AUTO: 82.3 FL (ref 79–97)
MONOCYTES # BLD AUTO: 0.68 10*3/MM3 (ref 0.1–0.9)
MONOCYTES NFR BLD AUTO: 11.1 % (ref 5–12)
NEUTROPHILS NFR BLD AUTO: 4.17 10*3/MM3 (ref 1.7–7)
NEUTROPHILS NFR BLD AUTO: 68.5 % (ref 42.7–76)
NRBC BLD AUTO-RTO: 0 /100 WBC (ref 0–0.2)
PLATELET # BLD AUTO: 326 10*3/MM3 (ref 140–450)
PMV BLD AUTO: 10 FL (ref 6–12)
POTASSIUM SERPL-SCNC: 4.5 MMOL/L (ref 3.5–5.2)
PROT SERPL-MCNC: 6.8 G/DL (ref 6–8.5)
RBC # BLD AUTO: 4.47 10*6/MM3 (ref 4.14–5.8)
SODIUM SERPL-SCNC: 139 MMOL/L (ref 136–145)
T4 FREE SERPL-MCNC: 1.06 NG/DL (ref 0.93–1.7)
TRIGL SERPL-MCNC: 38 MG/DL (ref 0–150)
TSH SERPL DL<=0.05 MIU/L-ACNC: 2.67 UIU/ML (ref 0.27–4.2)
VLDLC SERPL-MCNC: 10 MG/DL (ref 5–40)
WBC NRBC COR # BLD: 6.1 10*3/MM3 (ref 3.4–10.8)

## 2023-11-02 PROCEDURE — 36415 COLL VENOUS BLD VENIPUNCTURE: CPT

## 2023-11-02 PROCEDURE — 85025 COMPLETE CBC W/AUTO DIFF WBC: CPT

## 2023-11-02 PROCEDURE — 82248 BILIRUBIN DIRECT: CPT

## 2023-11-02 PROCEDURE — 84439 ASSAY OF FREE THYROXINE: CPT

## 2023-11-02 PROCEDURE — 80061 LIPID PANEL: CPT

## 2023-11-02 PROCEDURE — 83036 HEMOGLOBIN GLYCOSYLATED A1C: CPT

## 2023-11-02 PROCEDURE — 84443 ASSAY THYROID STIM HORMONE: CPT

## 2023-11-02 PROCEDURE — 80053 COMPREHEN METABOLIC PANEL: CPT

## 2023-11-02 PROCEDURE — 82306 VITAMIN D 25 HYDROXY: CPT

## 2023-11-02 RX ORDER — ERGOCALCIFEROL 1.25 MG/1
50000 CAPSULE ORAL WEEKLY
Qty: 13 CAPSULE | Refills: 1 | Status: SHIPPED | OUTPATIENT
Start: 2023-11-02

## 2023-11-03 ENCOUNTER — TELEPHONE (OUTPATIENT)
Dept: CARDIOLOGY | Facility: CLINIC | Age: 78
End: 2023-11-03
Payer: MEDICARE

## 2023-11-03 NOTE — TELEPHONE ENCOUNTER
----- Message from CARSON Ruano sent at 11/3/2023  8:24 AM EDT -----  Lipid panel, renal function, liver enzymes, and electrolytes are all stable, continue current medication

## 2023-11-09 RX ORDER — ATORVASTATIN CALCIUM 80 MG/1
80 TABLET, FILM COATED ORAL DAILY
Qty: 90 TABLET | Refills: 3 | Status: SHIPPED | OUTPATIENT
Start: 2023-11-09

## 2023-11-13 DIAGNOSIS — G47.00 INSOMNIA, UNSPECIFIED TYPE: ICD-10-CM

## 2023-11-13 RX ORDER — DARIDOREXANT 50 MG/1
50 TABLET, FILM COATED ORAL
Qty: 30 TABLET | Refills: 5 | Status: SHIPPED | OUTPATIENT
Start: 2023-11-13

## 2023-11-14 ENCOUNTER — TELEPHONE (OUTPATIENT)
Dept: INTERNAL MEDICINE | Facility: CLINIC | Age: 78
End: 2023-11-14
Payer: MEDICARE

## 2023-11-20 ENCOUNTER — OFFICE VISIT (OUTPATIENT)
Dept: CARDIOLOGY | Facility: CLINIC | Age: 78
End: 2023-11-20
Payer: MEDICARE

## 2023-11-20 VITALS
BODY MASS INDEX: 25.48 KG/M2 | HEIGHT: 71 IN | WEIGHT: 182 LBS | HEART RATE: 57 BPM | SYSTOLIC BLOOD PRESSURE: 127 MMHG | DIASTOLIC BLOOD PRESSURE: 53 MMHG

## 2023-11-20 DIAGNOSIS — I25.810 CORONARY ARTERY DISEASE INVOLVING CORONARY BYPASS GRAFT OF NATIVE HEART WITHOUT ANGINA PECTORIS: Primary | ICD-10-CM

## 2023-11-20 DIAGNOSIS — I10 ESSENTIAL HYPERTENSION: ICD-10-CM

## 2023-11-20 DIAGNOSIS — E78.2 MIXED HYPERLIPIDEMIA: ICD-10-CM

## 2023-11-20 PROBLEM — J06.9 ACUTE URI: Status: RESOLVED | Noted: 2022-11-18 | Resolved: 2023-11-20

## 2023-11-20 PROBLEM — I95.1 ORTHOSTATIC HYPOTENSION: Status: RESOLVED | Noted: 2022-03-17 | Resolved: 2023-11-20

## 2023-11-20 NOTE — PROGRESS NOTES
Chief Complaint  Follow-up and Coronary Artery Disease    Subjective            History of Present Illness  Reji Mcmillan is a 78-year-old white/ male patient who presents to the office today for follow-up.  He has CAD with prior CABG, hypertension, and hyperlipidemia.  He is compliant with medications.  He denies any chest pain, shortness of breath, lightheadedness/dizziness, palpitations, or edema.    PMH  Past Medical History:   Diagnosis Date    Allergic rhinitis due to allergen     Benign prostatic hyperplasia     CAD s/p CABG 03/30/2022    Chest pain     Chronic allergic rhinitis     CKD (chronic kidney disease) stage 3, GFR 30-59 ml/min     Eczema     Erectile dysfunction 10/19/2020    GERD (gastroesophageal reflux disease)     Heart disease     High blood pressure     High cholesterol     Hypercholesteremia     Hypertension     Controlled    Hypothyroidism     Orthostatic hypotension     Polio     Prostate disorder          ALLERGY  Allergies   Allergen Reactions    Sulfa Antibiotics Unknown - Low Severity     Flu like symptoms caused from taking medication          SURGICALHX  Past Surgical History:   Procedure Laterality Date    CARDIAC SURGERY      CHOLECYSTECTOMY      COLONOSCOPY      COLONOSCOPY N/A 3/20/2023    Procedure: COLONOSCOPY;  Surgeon: Jacques Dc MD;  Location: [facility] ENDOSCOPY;  Service: General;  Laterality: N/A;  diverticulosis, hemorrhoids    CORONARY ARTERY BYPASS GRAFT      cabbage x2    DENTAL PROCEDURE      HERNIA REPAIR      TOE SURGERY Left           SOC  Social History     Socioeconomic History    Marital status:    Tobacco Use    Smoking status: Never    Smokeless tobacco: Never   Vaping Use    Vaping Use: Never used   Substance and Sexual Activity    Alcohol use: Yes     Comment: drinks rarely; beer and liquor    Drug use: Never    Sexual activity: Defer         FAMHX  Family History   Problem Relation Age of Onset    Hyperlipidemia Mother     Heart  "disease Mother     Hypertension Mother     Hyperlipidemia Father     Hypertension Father     Heart attack Father     Malig Hyperthermia Neg Hx           MEDSIGONLY  Current Outpatient Medications on File Prior to Visit   Medication Sig    amLODIPine (NORVASC) 5 MG tablet Take 1 tablet by mouth Daily.    aspirin 81 MG EC tablet Take 1 tablet by mouth Daily.    atorvastatin (LIPITOR) 80 MG tablet TAKE 1 TABLET DAILY    cetirizine (zyrTEC) 10 MG tablet Take 1 tablet by mouth Daily.    Cholecalciferol 50 MCG (2000 UT) capsule Daily.    Daridorexant HCl (Quviviq) 50 MG tablet Take 1 tablet by mouth every night at bedtime.    Dupilumab (Dupixent) 300 MG/2ML solution pen-injector Inject 1 mL under the skin into the appropriate area as directed Every 14 (Fourteen) Days.    ketoconazole (NIZORAL) 2 % cream Apply 1 application  topically to the appropriate area as directed Daily.    lansoprazole (PREVACID) 30 MG capsule TAKE 1 CAPSULE DAILY    levothyroxine (SYNTHROID, LEVOTHROID) 25 MCG tablet TAKE 1 TABLET DAILY    losartan (Cozaar) 25 MG tablet Take 1 tablet by mouth Daily.    PARoxetine (PAXIL) 10 MG tablet TAKE 1 TABLET EVERY MORNING    ranolazine (RANEXA) 500 MG 12 hr tablet TAKE 1 TABLET TWICE A DAY    tamsulosin (FLOMAX) 0.4 MG capsule 24 hr capsule Take 1 capsule by mouth Daily for 360 days. 30 minutes after dinner meal    vitamin D (ERGOCALCIFEROL) 1.25 MG (39345 UT) capsule capsule Take 1 capsule by mouth 1 (One) Time Per Week. Take with a meal.    Xiaflex 0.9 MG injection     zolpidem CR (AMBIEN CR) 12.5 MG CR tablet Take 1 tablet by mouth At Night As Needed for Sleep.    [DISCONTINUED] losartan-hydrochlorothiazide (HYZAAR) 100-12.5 MG per tablet Daily. (Patient not taking: Reported on 11/20/2023)     Current Facility-Administered Medications on File Prior to Visit   Medication    collagenase clostrid histolyt (XIAFLEX) injection 0.58 mg         Objective   /53   Pulse 57   Ht 180.3 cm (71\")   Wt 82.6 kg " "(182 lb)   BMI 25.38 kg/m²       Physical Exam  HENT:      Head: Normocephalic.   Neck:      Vascular: No carotid bruit.   Cardiovascular:      Rate and Rhythm: Regular rhythm. Bradycardia present.      Pulses: Normal pulses.      Heart sounds: Normal heart sounds. No murmur heard.  Pulmonary:      Effort: Pulmonary effort is normal.      Breath sounds: Normal breath sounds.   Musculoskeletal:      Cervical back: Neck supple.      Right lower leg: No edema.      Left lower leg: No edema.   Skin:     General: Skin is dry.   Neurological:      Mental Status: He is alert and oriented to person, place, and time.   Psychiatric:         Behavior: Behavior normal.       ECG 12 Lead    Date/Time: 11/20/2023 11:40 AM  Performed by: Varsha Jang APRN    Authorized by: Varsha Jang APRN  Comparison: compared with previous ECG from 3/30/2022  Similar to previous ECG  Rhythm: sinus rhythm  Rate: bradycardic  BPM: 55  Conduction: conduction normal  ST Segments: ST segments normal  T Waves: T waves normal  QRS axis: normal  Other findings comments: Old inferior infarct    Clinical impression: abnormal EKG          Result Review :   The following data was reviewed by: CARSON Malik on 11/20/2023:  No results found for: \"PROBNP\"  CMP          11/2/2023    09:38   CMP   Glucose 96    BUN 15    Creatinine 1.38    EGFR 52.7    Sodium 139    Potassium 4.5    Chloride 102    Calcium 8.9    Total Protein 6.8    Albumin 3.8    Globulin 3.0    Total Bilirubin 0.5    Alkaline Phosphatase 69    AST (SGOT) 14    ALT (SGPT) 11    Albumin/Globulin Ratio 1.3    BUN/Creatinine Ratio 10.9    Anion Gap 11.3      CBC w/diff          11/2/2023    09:38   CBC w/Diff   WBC 6.10    RBC 4.47    Hemoglobin 12.3    Hematocrit 36.8    MCV 82.3    MCH 27.5    MCHC 33.4    RDW 13.0    Platelets 326    Neutrophil Rel % 68.5    Immature Granulocyte Rel % 0.5    Lymphocyte Rel % 16.2    Monocyte Rel % 11.1    Eosinophil Rel % 3.4  " "  Basophil Rel % 0.3       Lab Results   Component Value Date    TSH 2.670 11/02/2023      Lab Results   Component Value Date    FREET4 1.06 11/02/2023      No results found for: \"DDIMERQUANT\"  No results found for: \"MG\"   No results found for: \"DIGOXIN\"   No results found for: \"TROPONINT\"        Lipid Panel          11/2/2023    09:38   Lipid Panel   Total Cholesterol 113    Triglycerides 38    HDL Cholesterol 51    VLDL Cholesterol 10    LDL Cholesterol  52    LDL/HDL Ratio 1.07           Assessment and Plan    Diagnoses and all orders for this visit:    1. CAD s/p CABG (Primary)  He denies any anginal symptoms, continue aspirin 81 mg daily and Ranexa 500 mg twice daily.  -     ECG 12 Lead    2. Essential hypertension  Currently controlled and without adverse effects from medication, continue amlodipine 5 mg daily and losartan 25 mg daily.    3. Mixed hyperlipidemia  Last lipid panel was 11/2/2023 with LDL 52 which is within goal range, continue atorvastatin 80 mg daily.          Follow Up   Return in about 6 months (around 5/20/2024) for Follow up with Dr Mata.    Patient was given instructions and counseling regarding his condition or for health maintenance advice. Please see specific information pulled into the AVS if appropriate.     Reji Mcmillan  reports that he has never smoked. He has never used smokeless tobacco.           Varsha Jang, APRN  11/20/23  09:22 EST    Dictated Utilizing Dragon Dictation    "

## 2023-12-04 ENCOUNTER — PROCEDURE VISIT (OUTPATIENT)
Dept: UROLOGY | Facility: CLINIC | Age: 78
End: 2023-12-04
Payer: MEDICARE

## 2023-12-04 DIAGNOSIS — N48.6 PEYRONIE DISEASE: Primary | ICD-10-CM

## 2023-12-04 DIAGNOSIS — L29.3 PERINEAL ITCHING, MALE: Primary | ICD-10-CM

## 2023-12-04 PROCEDURE — 54200 INJECTION PX PEYRONIE DS: CPT | Performed by: UROLOGY

## 2023-12-04 RX ORDER — CLOTRIMAZOLE AND BETAMETHASONE DIPROPIONATE 10; .64 MG/G; MG/G
1 CREAM TOPICAL 2 TIMES DAILY PRN
Qty: 1 EACH | Refills: 0 | Status: SHIPPED | OUTPATIENT
Start: 2023-12-04 | End: 2023-12-14

## 2023-12-04 NOTE — PROGRESS NOTES
Procedures    This is the first injection of third cycle.  His curvature is slightly improved but still pointing up at the base of penis.  He still has a large plaque at the base of the penis is almost 3 cm time 2.5 cm.  That is where I injected.    Reconstitution of Xiaflex was done using sterile diluent consisting of Calcium Chloride Dihydrate in Normal Saline.  Penis was prepped with Chlorhexidine and then sterilely draped.  Using a 30 gauge needle, 1% Xylocaine was injected under the the skin overlying the plaque.  The medial wall of the plaque was injected and the solution slowly released while withdrawing the needle in a transverse manner.  Pressure was applied to the injection site and hemostasis was achieved.  Pressure dressing was given.  Repeat injection tomorrow

## 2023-12-05 ENCOUNTER — PROCEDURE VISIT (OUTPATIENT)
Dept: UROLOGY | Facility: CLINIC | Age: 78
End: 2023-12-05
Payer: MEDICARE

## 2023-12-05 DIAGNOSIS — N48.6 PEYRONIE DISEASE: Primary | ICD-10-CM

## 2023-12-05 NOTE — PROGRESS NOTES
Procedures    This is second injection of third cycle.  I injected in the plaque at the base of the penis yesterday.  I gave him my injection again in the same area    Reconstitution of Xiaflex was done using sterile diluent consisting of Calcium Chloride Dihydrate in Normal Saline.  Penis was prepped with Chlorhexidine and then sterilely draped.  Using a 30 gauge needle, 1% Xylocaine was injected under the the skin overlying the plaque.  The medial wall of the plaque was injected and the solution slowly released while withdrawing the needle in a transverse manner.  Pressure was applied to the injection site and hemostasis was achieved.  Pressure dressing was given and we will give him another cycle in 6 weeks time.  I will ask the patient to bring a picture of erect penis next time so we see how much improvement patient has

## 2023-12-06 ENCOUNTER — TELEPHONE (OUTPATIENT)
Dept: UROLOGY | Facility: CLINIC | Age: 78
End: 2023-12-06
Payer: MEDICARE

## 2023-12-20 DIAGNOSIS — G47.00 INSOMNIA, UNSPECIFIED TYPE: Chronic | ICD-10-CM

## 2023-12-20 RX ORDER — ZOLPIDEM TARTRATE 12.5 MG/1
12.5 TABLET, FILM COATED, EXTENDED RELEASE ORAL NIGHTLY PRN
Qty: 30 TABLET | Refills: 5 | Status: SHIPPED | OUTPATIENT
Start: 2023-12-20

## 2023-12-20 NOTE — TELEPHONE ENCOUNTER
Caller: Reji Mcmillan    Relationship: Self    Best call back number: [phone]     Requested Prescriptions:   Requested Prescriptions     Pending Prescriptions Disp Refills    zolpidem CR (AMBIEN CR) 12.5 MG CR tablet 30 tablet 5     Sig: Take 1 tablet by mouth At Night As Needed for Sleep.        Pharmacy where request should be sent: [facility] [id number] [place], [place] - [address] AT [facility] [address] - [phone] [facility] [phone] FX     Last office visit with prescribing clinician: 10/23/2023   Last telemedicine visit with prescribing clinician: Visit date not found   Next office visit with prescribing clinician: 4/24/2024     Additional details provided by patient: PATIENT STATED THAT HE ONLY HAS 1 DAY OF THE MEDICATION REMAINING     Does the patient have less than a 3 day supply:  [x] Yes  [] No    Lesvia Rea Rep   12/20/23 11:41 EST

## 2024-01-04 DIAGNOSIS — N48.6 PEYRONIE'S DISEASE: Primary | ICD-10-CM

## 2024-01-04 RX ORDER — NEEDLES, SAFETY 22GX1 1/2"
NEEDLE, DISPOSABLE MISCELLANEOUS
Qty: 4 EACH | Refills: 3 | Status: SHIPPED | OUTPATIENT
Start: 2024-01-04

## 2024-01-08 DIAGNOSIS — G47.00 INSOMNIA, UNSPECIFIED TYPE: Chronic | ICD-10-CM

## 2024-01-08 RX ORDER — ZOLPIDEM TARTRATE 12.5 MG/1
12.5 TABLET, FILM COATED, EXTENDED RELEASE ORAL NIGHTLY PRN
Qty: 30 TABLET | Refills: 5 | OUTPATIENT
Start: 2024-01-08

## 2024-01-08 NOTE — TELEPHONE ENCOUNTER
Caller: Reji Mcmillan    Relationship: Self    Best call back number: [phone]     Requested Prescriptions:   Requested Prescriptions     Pending Prescriptions Disp Refills    zolpidem CR (AMBIEN CR) 12.5 MG CR tablet 30 tablet 5     Sig: Take 1 tablet by mouth At Night As Needed for Sleep.        Pharmacy where request should be sent: [facility] [address] [phone] [facility] [phone]      Last office visit with prescribing clinician: 10/23/2023   Last telemedicine visit with prescribing clinician: Visit date not found   Next office visit with prescribing clinician: 4/24/2024     Additional details provided by patient: PATIENT IS NEEDING A REFILL    Does the patient have less than a 3 day supply:  [x] Yes  [] No    Would you like a call back once the refill request has been completed: [x] Yes [] No    If the office needs to give you a call back, can they leave a voicemail: [x] Yes [] No    Lesvia Wilkes Rep   01/08/24 13:17 EST

## 2024-01-16 ENCOUNTER — PROCEDURE VISIT (OUTPATIENT)
Dept: UROLOGY | Facility: CLINIC | Age: 79
End: 2024-01-16
Payer: MEDICARE

## 2024-01-16 DIAGNOSIS — N48.6 PEYRONIE'S DISEASE: Primary | ICD-10-CM

## 2024-01-16 DIAGNOSIS — G47.00 INSOMNIA, UNSPECIFIED TYPE: Chronic | ICD-10-CM

## 2024-01-16 PROCEDURE — 54200 INJECTION PX PEYRONIE DS: CPT | Performed by: UROLOGY

## 2024-01-16 RX ORDER — ZOLPIDEM TARTRATE 12.5 MG/1
12.5 TABLET, FILM COATED, EXTENDED RELEASE ORAL NIGHTLY PRN
Qty: 90 TABLET | Refills: 1 | Status: SHIPPED | OUTPATIENT
Start: 2024-01-16

## 2024-01-16 NOTE — PROGRESS NOTES
Procedures  Patient's curvature is improving.  Still has a large plaque at the base which is almost 3.5 cm time 3 cm with a narrow extension of the plaque all the way to corona.  I injected again at the base in the Trident fashion  Reconstitution of Xiaflex was done using sterile diluent consisting of Calcium Chloride Dihydrate in Normal Saline.  Penis was prepped with Chlorhexidine and then sterilely draped.  Using a 30 gauge needle, 1% Xylocaine was injected under the the skin overlying the plaque.  The medial wall of the plaque was injected and the solution slowly released while withdrawing the needle in a transverse manner.  Pressure was applied to the injection site and hemostasis was achieved.  Pressure dressing was given undergo repeat injection tomorrow

## 2024-01-17 ENCOUNTER — PROCEDURE VISIT (OUTPATIENT)
Dept: UROLOGY | Facility: CLINIC | Age: 79
End: 2024-01-17
Payer: MEDICARE

## 2024-01-17 DIAGNOSIS — N48.6 PEYRONIE'S DISEASE: Primary | ICD-10-CM

## 2024-01-17 NOTE — PROGRESS NOTES
Procedures  This is the second injection of fourth cycle.  I injected at plaque which is located at the base of penis in the Trident fashion  Reconstitution of Xiaflex was done using sterile diluent consisting of Calcium Chloride Dihydrate in Normal Saline.  Penis was prepped with Chlorhexidine and then sterilely draped.  Using a 30 gauge needle, 1% Xylocaine was injected under the the skin overlying the plaque.  The medial wall of the plaque was injected and the solution slowly released while withdrawing the needle in a transverse manner.  Pressure was applied to the injection site and hemostasis was achieved.  Pressure dressing was given.  Patient tolerated the procedure well.    I am going to recheck him in 6 weeks time

## 2024-01-23 RX ORDER — RANOLAZINE 500 MG/1
500 TABLET, EXTENDED RELEASE ORAL 2 TIMES DAILY
Qty: 180 TABLET | Refills: 3 | Status: SHIPPED | OUTPATIENT
Start: 2024-01-23

## 2024-01-30 ENCOUNTER — TELEPHONE (OUTPATIENT)
Dept: INTERNAL MEDICINE | Facility: CLINIC | Age: 79
End: 2024-01-30

## 2024-01-30 NOTE — TELEPHONE ENCOUNTER
Caller: [facility] - [facility] [address] [place] [address] [phone] [facility] [phone] FX    Relationship to patient: Pharmacy    Best call back number: [phone]    Patient is needing: PHARMACIST CALLED NEEDING CLARIFICATION ON THE PATIENTS ZOLPIDEM PRESCRIPTION AND WHAT QUANTITY THE PATIENT IS SUPPOSED TO BE TAKING DAILY. CALLER STATES THE REFERENCE NUMBER IS [id number].

## 2024-03-04 ENCOUNTER — OFFICE VISIT (OUTPATIENT)
Dept: UROLOGY | Facility: CLINIC | Age: 79
End: 2024-03-04
Payer: MEDICARE

## 2024-03-04 VITALS
HEIGHT: 71 IN | HEART RATE: 67 BPM | SYSTOLIC BLOOD PRESSURE: 152 MMHG | BODY MASS INDEX: 26.18 KG/M2 | WEIGHT: 187 LBS | DIASTOLIC BLOOD PRESSURE: 68 MMHG

## 2024-03-04 DIAGNOSIS — N40.0 BENIGN PROSTATIC HYPERPLASIA WITHOUT LOWER URINARY TRACT SYMPTOMS: ICD-10-CM

## 2024-03-04 DIAGNOSIS — N48.6 PEYRONIE'S DISEASE: Primary | ICD-10-CM

## 2024-03-04 DIAGNOSIS — N52.01 ERECTILE DYSFUNCTION DUE TO ARTERIAL INSUFFICIENCY: ICD-10-CM

## 2024-03-04 DIAGNOSIS — Z87.898 HISTORY OF ELEVATED PSA: ICD-10-CM

## 2024-03-04 LAB
BILIRUB BLD-MCNC: ABNORMAL MG/DL
CLARITY, POC: CLEAR
COLOR UR: YELLOW
EXPIRATION DATE: ABNORMAL
GLUCOSE UR STRIP-MCNC: NEGATIVE MG/DL
KETONES UR QL: NEGATIVE
LEUKOCYTE EST, POC: NEGATIVE
Lab: ABNORMAL
NITRITE UR-MCNC: NEGATIVE MG/ML
PH UR: 5.5 [PH] (ref 5–8)
PROT UR STRIP-MCNC: NEGATIVE MG/DL
RBC # UR STRIP: NEGATIVE /UL
SP GR UR: 1.03 (ref 1–1.03)
UROBILINOGEN UR QL: ABNORMAL

## 2024-03-04 PROCEDURE — 3078F DIAST BP <80 MM HG: CPT | Performed by: UROLOGY

## 2024-03-04 PROCEDURE — 99214 OFFICE O/P EST MOD 30 MIN: CPT | Performed by: UROLOGY

## 2024-03-04 PROCEDURE — 81003 URINALYSIS AUTO W/O SCOPE: CPT | Performed by: UROLOGY

## 2024-03-04 PROCEDURE — 3077F SYST BP >= 140 MM HG: CPT | Performed by: UROLOGY

## 2024-03-04 RX ORDER — TADALAFIL 5 MG/1
5 TABLET ORAL DAILY PRN
Qty: 90 TABLET | Refills: 1 | Status: SHIPPED | OUTPATIENT
Start: 2024-03-04 | End: 2024-08-31

## 2024-03-04 RX ORDER — TAMSULOSIN HYDROCHLORIDE 0.4 MG/1
1 CAPSULE ORAL DAILY
Qty: 90 CAPSULE | Refills: 3 | Status: SHIPPED | OUTPATIENT
Start: 2024-03-04 | End: 2025-02-27

## 2024-03-04 RX ORDER — TADALAFIL 5 MG/1
5 TABLET ORAL DAILY PRN
Qty: 90 TABLET | Refills: 3 | Status: SHIPPED | OUTPATIENT
Start: 2024-03-04 | End: 2024-03-04

## 2024-03-04 NOTE — PROGRESS NOTES
"Chief Complaint  Abnormal Penile Curvature (6 WK F/U)    Post Xiaflex treatment.  BPH with obstructive uropathy  Subjective no acute distress        Reji Mcmillan presents to [facility]  History of Present Illness    Patient has multiple  problems.  Patient has a penile plaque and has had 4 cycles of treatment with Xiaflex.  Patient continues to have a plaque at the penis at the base.  Patient's wife has dementia so is not sexually active and so he does not know how much deformity he has but he continues to use penile stretching.    Patient is urinating without any difficulties.  His PSA was elevated but last PSA was normal.  Patient has no dysuria or gross hematuria and his AUA score is 7/35 and quality score is 2.    Patient continues to have poor erection.  He does not have a sexual partner yet.    Objective no acute distress  Vital Signs:   /68 (BP Location: Left arm, Patient Position: Sitting, Cuff Size: Adult)   Pulse 67   Ht 180.3 cm (71\")   Wt 84.8 kg (187 lb)   BMI 26.08 kg/m²     Allergies   Allergen Reactions    Sulfa Antibiotics Unknown - Low Severity     Flu like symptoms caused from taking medication      Past medical history:  has a past medical history of Allergic rhinitis due to allergen, Benign prostatic hyperplasia, CAD s/p CABG (03/30/2022), Chest pain, Chronic allergic rhinitis, CKD (chronic kidney disease) stage 3, GFR 30-59 ml/min, Eczema, Erectile dysfunction (10/19/2020), GERD (gastroesophageal reflux disease), Heart disease, High blood pressure, High cholesterol, Hypercholesteremia, Hypertension, Hypothyroidism, Orthostatic hypotension, Polio, and Prostate disorder.   Past surgical history:  has a past surgical history that includes Cardiac surgery; Cholecystectomy; Colonoscopy; Coronary artery bypass graft; Hernia repair; Dental surgery; Toe Surgery (Left); and Colonoscopy (N/A, 3/20/2023).  Personal history: family history includes Heart attack in " his father; Heart disease in his mother; Hyperlipidemia in his father and mother; Hypertension in his father and mother.  Social history:  reports that he has never smoked. He has never used smokeless tobacco. He reports current alcohol use. He reports that he does not use drugs.    Review of Systems    No change from before    Physical Exam  Constitutional:       General: He is not in acute distress.     Appearance: Normal appearance. He is normal weight. He is not ill-appearing or toxic-appearing.   HENT:      Head: Normocephalic and atraumatic.      Ears:      Comments: No loss of hearing  Abdominal:      Palpations: There is no mass.      Tenderness: There is no abdominal tenderness. There is no right CVA tenderness or left CVA tenderness.   Genitourinary:     Comments: Penis is circumcised.    Penile plaque 4 cm at the base and extending about 8 cm distally.  Maximum plaque is about 4 cm time 4 cm at the base.  There is no tenderness on the plaque.    Right and left scrotum is normal.    Right and left testicle and epididymis is normal.    FLY.  Prostate gland is almost 80 g and does have firm area over the right base of the prostate.  Musculoskeletal:         General: Normal range of motion.   Skin:     General: Skin is warm.      Coloration: Skin is not jaundiced.   Neurological:      General: No focal deficit present.      Mental Status: He is alert and oriented to person, place, and time.      Motor: No weakness.      Gait: Gait normal.   Psychiatric:         Mood and Affect: Mood normal.         Behavior: Behavior normal.         Thought Content: Thought content normal.         Judgment: Judgment normal.        Result Review :                 Assessment and Plan    Diagnoses and all orders for this visit:    1. Erectile dysfunction due to arterial insufficiency (Primary)  -     tadalafil (CIALIS) 5 MG tablet; Take 1 tablet by mouth Daily As Needed for Erectile Dysfunction for up to 360 days.  Dispense: 90  tablet; Refill: 3    2. Peyronie's disease  -     Cancel: POC Urinalysis Dipstick, Automated  -     POC Urinalysis Dipstick, Automated  -     tadalafil (CIALIS) 5 MG tablet; Take 1 tablet by mouth Daily As Needed for Erectile Dysfunction for up to 360 days.  Dispense: 90 tablet; Refill: 3    3. Benign prostatic hyperplasia without lower urinary tract symptoms  -     PSA DIAGNOSTIC; Future  -     tamsulosin (FLOMAX) 0.4 MG capsule 24 hr capsule; Take 1 capsule by mouth Daily for 360 days. 30 minutes after dinner meal  Dispense: 90 capsule; Refill: 3  -     tadalafil (CIALIS) 5 MG tablet; Take 1 tablet by mouth Daily As Needed for Erectile Dysfunction for up to 360 days.  Dispense: 90 tablet; Refill: 3    4. History of elevated PSA  -     PSA DIAGNOSTIC; Future    I am going to recheck him in 6 months after a PSA.  Start the patient on tadalafil 5 mg daily and he will continue using the stretching device for his curvature of the penis.  All the questions answered.     Brief Urine Lab Results  (Last result in the past 365 days)        Color   Clarity   Blood   Leuk Est   Nitrite   Protein   CREAT   Urine HCG        03/04/24 1007 Yellow   Clear   Negative   Negative   Negative   Negative                    Follow Up   No follow-ups on file.  Patient was given instructions and counseling regarding his condition or for health maintenance advice. Please see specific information pulled into the AVS if appropriate.     Mellisa Montes MD

## 2024-03-08 ENCOUNTER — TELEPHONE (OUTPATIENT)
Dept: UROLOGY | Facility: CLINIC | Age: 79
End: 2024-03-08
Payer: MEDICARE

## 2024-03-29 ENCOUNTER — TRANSCRIBE ORDERS (OUTPATIENT)
Dept: LAB | Facility: HOSPITAL | Age: 79
End: 2024-03-29
Payer: MEDICARE

## 2024-03-29 ENCOUNTER — LAB (OUTPATIENT)
Dept: LAB | Facility: HOSPITAL | Age: 79
End: 2024-03-29
Payer: MEDICARE

## 2024-03-29 DIAGNOSIS — E03.8 SECONDARY HYPOTHYROIDISM: ICD-10-CM

## 2024-03-29 DIAGNOSIS — I77.9 DISEASE OF ARTERY: ICD-10-CM

## 2024-03-29 DIAGNOSIS — I10 HYPERTENSION, ESSENTIAL: ICD-10-CM

## 2024-03-29 DIAGNOSIS — N40.0 ENLARGED PROSTATE: ICD-10-CM

## 2024-03-29 DIAGNOSIS — N18.32 CHRONIC KIDNEY DISEASE (CKD) STAGE G3B/A1, MODERATELY DECREASED GLOMERULAR FILTRATION RATE (GFR) BETWEEN 30-44 ML/MIN/1.73 SQUARE METER AND ALBUMINURIA CREATININE RATIO LESS THAN 30 MG/G (CMS/H*: ICD-10-CM

## 2024-03-29 DIAGNOSIS — E55.9 VITAMIN D DEFICIENCY: ICD-10-CM

## 2024-03-29 DIAGNOSIS — N18.32 CHRONIC KIDNEY DISEASE (CKD) STAGE G3B/A1, MODERATELY DECREASED GLOMERULAR FILTRATION RATE (GFR) BETWEEN 30-44 ML/MIN/1.73 SQUARE METER AND ALBUMINURIA CREATININE RATIO LESS THAN 30 MG/G (CMS/H*: Primary | ICD-10-CM

## 2024-03-29 DIAGNOSIS — E78.2 MIXED HYPERLIPIDEMIA: ICD-10-CM

## 2024-03-29 DIAGNOSIS — I95.1 ORTHOSTATIC HYPOTENSION: ICD-10-CM

## 2024-03-29 DIAGNOSIS — E03.9 HYPOTHYROIDISM, UNSPECIFIED TYPE: ICD-10-CM

## 2024-03-29 LAB
25(OH)D3 SERPL-MCNC: 38.8 NG/ML (ref 30–100)
ALBUMIN SERPL-MCNC: 4.2 G/DL (ref 3.5–5.2)
ANION GAP SERPL CALCULATED.3IONS-SCNC: 11.2 MMOL/L (ref 5–15)
BACTERIA UR QL AUTO: NORMAL /HPF
BASOPHILS # BLD AUTO: 0.02 10*3/MM3 (ref 0–0.2)
BASOPHILS NFR BLD AUTO: 0.3 % (ref 0–1.5)
BILIRUB UR QL STRIP: NEGATIVE
BUN SERPL-MCNC: 19 MG/DL (ref 8–23)
BUN/CREAT SERPL: 11.9 (ref 7–25)
CALCIUM SPEC-SCNC: 9.1 MG/DL (ref 8.6–10.5)
CHLORIDE SERPL-SCNC: 103 MMOL/L (ref 98–107)
CLARITY UR: ABNORMAL
CO2 SERPL-SCNC: 25.8 MMOL/L (ref 22–29)
COLOR UR: YELLOW
CREAT SERPL-MCNC: 1.6 MG/DL (ref 0.76–1.27)
CREAT UR-MCNC: 170.7 MG/DL
DEPRECATED RDW RBC AUTO: 41.1 FL (ref 37–54)
EGFRCR SERPLBLD CKD-EPI 2021: 44.1 ML/MIN/1.73
EOSINOPHIL # BLD AUTO: 0.07 10*3/MM3 (ref 0–0.4)
EOSINOPHIL NFR BLD AUTO: 1.1 % (ref 0.3–6.2)
ERYTHROCYTE [DISTWIDTH] IN BLOOD BY AUTOMATED COUNT: 13.8 % (ref 12.3–15.4)
GLUCOSE SERPL-MCNC: 90 MG/DL (ref 65–99)
GLUCOSE UR STRIP-MCNC: NEGATIVE MG/DL
HCT VFR BLD AUTO: 39.2 % (ref 37.5–51)
HGB BLD-MCNC: 13.2 G/DL (ref 13–17.7)
HGB UR QL STRIP.AUTO: NEGATIVE
HYALINE CASTS UR QL AUTO: NORMAL /LPF
IMM GRANULOCYTES # BLD AUTO: 0.01 10*3/MM3 (ref 0–0.05)
IMM GRANULOCYTES NFR BLD AUTO: 0.2 % (ref 0–0.5)
KETONES UR QL STRIP: NEGATIVE
LEUKOCYTE ESTERASE UR QL STRIP.AUTO: ABNORMAL
LYMPHOCYTES # BLD AUTO: 1.2 10*3/MM3 (ref 0.7–3.1)
LYMPHOCYTES NFR BLD AUTO: 19.7 % (ref 19.6–45.3)
MCH RBC QN AUTO: 27.4 PG (ref 26.6–33)
MCHC RBC AUTO-ENTMCNC: 33.7 G/DL (ref 31.5–35.7)
MCV RBC AUTO: 81.5 FL (ref 79–97)
MONOCYTES # BLD AUTO: 0.61 10*3/MM3 (ref 0.1–0.9)
MONOCYTES NFR BLD AUTO: 10 % (ref 5–12)
NEUTROPHILS NFR BLD AUTO: 4.19 10*3/MM3 (ref 1.7–7)
NEUTROPHILS NFR BLD AUTO: 68.7 % (ref 42.7–76)
NITRITE UR QL STRIP: NEGATIVE
NRBC BLD AUTO-RTO: 0 /100 WBC (ref 0–0.2)
PH UR STRIP.AUTO: 5.5 [PH] (ref 5–8)
PHOSPHATE SERPL-MCNC: 3.8 MG/DL (ref 2.5–4.5)
PLATELET # BLD AUTO: 252 10*3/MM3 (ref 140–450)
PMV BLD AUTO: 11 FL (ref 6–12)
POTASSIUM SERPL-SCNC: 4.7 MMOL/L (ref 3.5–5.2)
PROT ?TM UR-MCNC: 13.8 MG/DL
PROT UR QL STRIP: ABNORMAL
PROT/CREAT UR: 0.08 MG/G{CREAT}
PTH-INTACT SERPL-MCNC: 44.3 PG/ML (ref 15–65)
RBC # BLD AUTO: 4.81 10*6/MM3 (ref 4.14–5.8)
RBC # UR STRIP: NORMAL /HPF
REF LAB TEST METHOD: NORMAL
SODIUM SERPL-SCNC: 140 MMOL/L (ref 136–145)
SP GR UR STRIP: 1.02 (ref 1–1.03)
SQUAMOUS #/AREA URNS HPF: NORMAL /HPF
UROBILINOGEN UR QL STRIP: ABNORMAL
WBC # UR STRIP: NORMAL /HPF
WBC NRBC COR # BLD AUTO: 6.1 10*3/MM3 (ref 3.4–10.8)

## 2024-03-29 PROCEDURE — 81001 URINALYSIS AUTO W/SCOPE: CPT

## 2024-03-29 PROCEDURE — 83970 ASSAY OF PARATHORMONE: CPT

## 2024-03-29 PROCEDURE — 85025 COMPLETE CBC W/AUTO DIFF WBC: CPT

## 2024-03-29 PROCEDURE — 82306 VITAMIN D 25 HYDROXY: CPT

## 2024-03-29 PROCEDURE — 80069 RENAL FUNCTION PANEL: CPT

## 2024-03-29 PROCEDURE — 82570 ASSAY OF URINE CREATININE: CPT

## 2024-03-29 PROCEDURE — 84156 ASSAY OF PROTEIN URINE: CPT

## 2024-03-29 PROCEDURE — 36415 COLL VENOUS BLD VENIPUNCTURE: CPT

## 2024-04-16 DIAGNOSIS — I10 ESSENTIAL HYPERTENSION: ICD-10-CM

## 2024-04-16 DIAGNOSIS — N40.0 BENIGN PROSTATIC HYPERPLASIA WITHOUT LOWER URINARY TRACT SYMPTOMS: ICD-10-CM

## 2024-04-16 RX ORDER — LOSARTAN POTASSIUM 25 MG/1
25 TABLET ORAL DAILY
Qty: 90 TABLET | Refills: 3 | Status: SHIPPED | OUTPATIENT
Start: 2024-04-16

## 2024-04-17 ENCOUNTER — LAB (OUTPATIENT)
Dept: LAB | Facility: HOSPITAL | Age: 79
End: 2024-04-17
Payer: MEDICARE

## 2024-04-17 DIAGNOSIS — D64.9 ANEMIA, UNSPECIFIED TYPE: ICD-10-CM

## 2024-04-17 DIAGNOSIS — E55.9 VITAMIN D DEFICIENCY: ICD-10-CM

## 2024-04-17 DIAGNOSIS — R73.9 ELEVATED BLOOD SUGAR: ICD-10-CM

## 2024-04-17 DIAGNOSIS — E78.2 MIXED HYPERLIPIDEMIA: ICD-10-CM

## 2024-04-17 DIAGNOSIS — R73.09 ABNORMAL BLOOD SUGAR: ICD-10-CM

## 2024-04-17 DIAGNOSIS — I10 ESSENTIAL HYPERTENSION: ICD-10-CM

## 2024-04-17 DIAGNOSIS — E03.9 HYPOTHYROIDISM, UNSPECIFIED TYPE: ICD-10-CM

## 2024-04-17 LAB
25(OH)D3 SERPL-MCNC: 32.9 NG/ML (ref 30–100)
ALBUMIN SERPL-MCNC: 4.1 G/DL (ref 3.5–5.2)
ALBUMIN/GLOB SERPL: 1.4 G/DL
ALP SERPL-CCNC: 52 U/L (ref 39–117)
ALT SERPL W P-5'-P-CCNC: 13 U/L (ref 1–41)
ANION GAP SERPL CALCULATED.3IONS-SCNC: 8 MMOL/L (ref 5–15)
AST SERPL-CCNC: 11 U/L (ref 1–40)
BASOPHILS # BLD AUTO: 0.01 10*3/MM3 (ref 0–0.2)
BASOPHILS NFR BLD AUTO: 0.2 % (ref 0–1.5)
BILIRUB SERPL-MCNC: 0.5 MG/DL (ref 0–1.2)
BUN SERPL-MCNC: 16 MG/DL (ref 8–23)
BUN/CREAT SERPL: 10.7 (ref 7–25)
CALCIUM SPEC-SCNC: 7.6 MG/DL (ref 8.6–10.5)
CHLORIDE SERPL-SCNC: 105 MMOL/L (ref 98–107)
CHOLEST SERPL-MCNC: 137 MG/DL (ref 0–200)
CO2 SERPL-SCNC: 27 MMOL/L (ref 22–29)
CREAT SERPL-MCNC: 1.5 MG/DL (ref 0.76–1.27)
DEPRECATED RDW RBC AUTO: 40.2 FL (ref 37–54)
EGFRCR SERPLBLD CKD-EPI 2021: 47.7 ML/MIN/1.73
EOSINOPHIL # BLD AUTO: 0.13 10*3/MM3 (ref 0–0.4)
EOSINOPHIL NFR BLD AUTO: 2.5 % (ref 0.3–6.2)
ERYTHROCYTE [DISTWIDTH] IN BLOOD BY AUTOMATED COUNT: 13.8 % (ref 12.3–15.4)
FERRITIN SERPL-MCNC: 31.8 NG/ML (ref 30–400)
FOLATE SERPL-MCNC: 10.2 NG/ML (ref 4.78–24.2)
GLOBULIN UR ELPH-MCNC: 3 GM/DL
GLUCOSE SERPL-MCNC: 103 MG/DL (ref 65–99)
HBA1C MFR BLD: 5.5 % (ref 4.8–5.6)
HCT VFR BLD AUTO: 39.1 % (ref 37.5–51)
HDLC SERPL-MCNC: 65 MG/DL (ref 40–60)
HGB BLD-MCNC: 13 G/DL (ref 13–17.7)
IMM GRANULOCYTES # BLD AUTO: 0.02 10*3/MM3 (ref 0–0.05)
IMM GRANULOCYTES NFR BLD AUTO: 0.4 % (ref 0–0.5)
IRON 24H UR-MRATE: 71 MCG/DL (ref 59–158)
IRON SATN MFR SERPL: 18 % (ref 20–50)
LDLC SERPL CALC-MCNC: 60 MG/DL (ref 0–100)
LDLC/HDLC SERPL: 0.93 {RATIO}
LYMPHOCYTES # BLD AUTO: 1.09 10*3/MM3 (ref 0.7–3.1)
LYMPHOCYTES NFR BLD AUTO: 20.9 % (ref 19.6–45.3)
MAGNESIUM SERPL-MCNC: 1.9 MG/DL (ref 1.6–2.4)
MCH RBC QN AUTO: 26.8 PG (ref 26.6–33)
MCHC RBC AUTO-ENTMCNC: 33.2 G/DL (ref 31.5–35.7)
MCV RBC AUTO: 80.6 FL (ref 79–97)
MONOCYTES # BLD AUTO: 0.55 10*3/MM3 (ref 0.1–0.9)
MONOCYTES NFR BLD AUTO: 10.6 % (ref 5–12)
NEUTROPHILS NFR BLD AUTO: 3.41 10*3/MM3 (ref 1.7–7)
NEUTROPHILS NFR BLD AUTO: 65.4 % (ref 42.7–76)
NRBC BLD AUTO-RTO: 0 /100 WBC (ref 0–0.2)
PLATELET # BLD AUTO: 240 10*3/MM3 (ref 140–450)
PMV BLD AUTO: 10.6 FL (ref 6–12)
POTASSIUM SERPL-SCNC: 4.3 MMOL/L (ref 3.5–5.2)
PROT SERPL-MCNC: 7.1 G/DL (ref 6–8.5)
RBC # BLD AUTO: 4.85 10*6/MM3 (ref 4.14–5.8)
SODIUM SERPL-SCNC: 140 MMOL/L (ref 136–145)
T4 FREE SERPL-MCNC: 1.02 NG/DL (ref 0.93–1.7)
TIBC SERPL-MCNC: 387 MCG/DL (ref 298–536)
TRANSFERRIN SERPL-MCNC: 260 MG/DL (ref 200–360)
TRIGL SERPL-MCNC: 58 MG/DL (ref 0–150)
TSH SERPL DL<=0.05 MIU/L-ACNC: 3.48 UIU/ML (ref 0.27–4.2)
VIT B12 BLD-MCNC: 319 PG/ML (ref 211–946)
VLDLC SERPL-MCNC: 12 MG/DL (ref 5–40)
WBC NRBC COR # BLD AUTO: 5.21 10*3/MM3 (ref 3.4–10.8)

## 2024-04-17 PROCEDURE — 83036 HEMOGLOBIN GLYCOSYLATED A1C: CPT

## 2024-04-17 PROCEDURE — 84439 ASSAY OF FREE THYROXINE: CPT

## 2024-04-17 PROCEDURE — 83735 ASSAY OF MAGNESIUM: CPT

## 2024-04-17 PROCEDURE — 82746 ASSAY OF FOLIC ACID SERUM: CPT

## 2024-04-17 PROCEDURE — 80053 COMPREHEN METABOLIC PANEL: CPT

## 2024-04-17 PROCEDURE — 82306 VITAMIN D 25 HYDROXY: CPT

## 2024-04-17 PROCEDURE — 82728 ASSAY OF FERRITIN: CPT

## 2024-04-17 PROCEDURE — 83540 ASSAY OF IRON: CPT

## 2024-04-17 PROCEDURE — 80061 LIPID PANEL: CPT

## 2024-04-17 PROCEDURE — 84443 ASSAY THYROID STIM HORMONE: CPT

## 2024-04-17 PROCEDURE — 85025 COMPLETE CBC W/AUTO DIFF WBC: CPT

## 2024-04-17 PROCEDURE — 36415 COLL VENOUS BLD VENIPUNCTURE: CPT

## 2024-04-17 PROCEDURE — 84466 ASSAY OF TRANSFERRIN: CPT

## 2024-04-17 PROCEDURE — 82607 VITAMIN B-12: CPT

## 2024-04-17 RX ORDER — TAMSULOSIN HYDROCHLORIDE 0.4 MG/1
CAPSULE ORAL
Qty: 90 CAPSULE | Refills: 3 | Status: SHIPPED | OUTPATIENT
Start: 2024-04-17

## 2024-04-22 RX ORDER — LANSOPRAZOLE 30 MG/1
CAPSULE, DELAYED RELEASE ORAL
Qty: 90 CAPSULE | Refills: 3 | Status: SHIPPED | OUTPATIENT
Start: 2024-04-22

## 2024-04-23 ENCOUNTER — TELEPHONE (OUTPATIENT)
Dept: INTERNAL MEDICINE | Age: 79
End: 2024-04-23
Payer: MEDICARE

## 2024-04-23 NOTE — PROGRESS NOTES
"The ABCs of the Annual Wellness Visit  Subsequent Medicare Wellness Visit    Subjective    Reji Mcmillan is a 78 y.o. male who presents for a Subsequent Medicare Wellness Visit.    The following portions of the patient's history were reviewed and   updated as appropriate: allergies, current medications, past family history, past medical history, past social history, past surgical history, and problem list.    Compared to one year ago, the patient feels his physical   health is worse.    Compared to one year ago, the patient feels his mental   health is the same.    Recent Hospitalizations:  He was not admitted to the hospital during the last year.       Current Medical Providers:  Patient Care Team:  Kenzie Mccloud APRN as PCP - General (Nurse Practitioner)    Outpatient Medications Prior to Visit   Medication Sig Dispense Refill    amLODIPine (NORVASC) 5 MG tablet Take 1 tablet by mouth Daily. 90 tablet 3    aspirin 81 MG EC tablet Take 1 tablet by mouth Daily.      atorvastatin (LIPITOR) 80 MG tablet TAKE 1 TABLET DAILY 90 tablet 3    B-D TB SYRINGE 1CC/27GX1/2\" 27G X 1/2\" 1 ML misc USE FOR XIAFLEX INJECTION 4 each 3    cetirizine (zyrTEC) 10 MG tablet Take 1 tablet by mouth Daily.      Cholecalciferol 50 MCG (2000 UT) capsule Daily.      Daridorexant HCl (Quviviq) 50 MG tablet Take 1 tablet by mouth every night at bedtime. 30 tablet 5    Dupilumab (Dupixent) 300 MG/2ML solution pen-injector Inject 1 mL under the skin into the appropriate area as directed Every 14 (Fourteen) Days.      ketoconazole (NIZORAL) 2 % cream Apply 1 application  topically to the appropriate area as directed Daily. 30 g 1    lansoprazole (PREVACID) 30 MG capsule TAKE 1 CAPSULE DAILY 90 capsule 3    levothyroxine (SYNTHROID, LEVOTHROID) 25 MCG tablet TAKE 1 TABLET DAILY 90 tablet 3    losartan (COZAAR) 25 MG tablet TAKE 1 TABLET DAILY 90 tablet 3    PARoxetine (PAXIL) 10 MG tablet TAKE 1 TABLET EVERY MORNING 90 tablet 3    " ranolazine (RANEXA) 500 MG 12 hr tablet TAKE 1 TABLET TWICE A  tablet 3    tadalafil (CIALIS) 5 MG tablet Take 1 tablet by mouth Daily As Needed for Erectile Dysfunction for up to 180 days. 90 tablet 1    tamsulosin (FLOMAX) 0.4 MG capsule 24 hr capsule TAKE 1 CAPSULE DAILY 30 MINUTES AFTER DINNER MEAL 90 capsule 3    vitamin D (ERGOCALCIFEROL) 1.25 MG (35703 UT) capsule capsule Take 1 capsule by mouth 1 (One) Time Per Week. Take with a meal. 13 capsule 1    Xiaflex 0.9 MG injection       zolpidem CR (AMBIEN CR) 12.5 MG CR tablet Take 1 tablet by mouth At Night As Needed for Sleep. 90 tablet 1     Facility-Administered Medications Prior to Visit   Medication Dose Route Frequency Provider Last Rate Last Admin    collagenase clostrid histolyt (XIAFLEX) injection 0.58 mg  0.58 mg Injection Once Mellisa Montes MD        collagenase clostrid histolyt (XIAFLEX) injection 0.58 mg  0.58 mg Injection Once Mellisa Montes MD        collagenase clostrid histolyt (XIAFLEX) injection 0.58 mg  0.58 mg Injection Once Mellisa Montes MD        collagenase clostrid histolyt (XIAFLEX) injection 0.58 mg  0.58 mg Injection Once Mellisa Montes MD        collagenase clostrid histolyt (XIAFLEX) injection 0.58 mg  0.58 mg Injection Once Mellisa Montes MD           No opioid medication identified on active medication list. I have reviewed chart for other potential  high risk medication/s and harmful drug interactions in the elderly.        Aspirin is on active medication list. Aspirin use is indicated based on review of current medical condition/s. Pros and cons of this therapy have been discussed today. Benefits of this medication outweigh potential harm.  Patient has been encouraged to continue taking this medication.  .      Patient Active Problem List   Diagnosis    Essential hypertension    Mixed hyperlipidemia    Hypothyroidism    Vitamin D deficiency    Anxiety and depression    Insomnia  "   Anemia    Chronic low back pain    Nodular hyperplasia of prostate gland    Elevated prostate specific antigen (PSA)    Disorder of arteries and arterioles, unspecified    Stage 3 chronic kidney disease    CAD s/p CABG    Peyronie disease    Vasculogenic erectile dysfunction    History of elevated PSA    Screening for malignant neoplasm of colon    History of colonic polyps    Other male erectile dysfunction     Advance Care Planning   Advance Care Planning     Advance Directive is on file.  ACP discussion was held with the patient during this visit. Patient has an advance directive in EMR which is still valid.      Objective    Vitals:    24 0926   BP: 100/40   BP Location: Right arm   Patient Position: Sitting   Cuff Size: Adult   Pulse: 74   Temp: 97.3 °F (36.3 °C)   TempSrc: Temporal   SpO2: 98%   Weight: 84 kg (185 lb 3.2 oz)   Height: 180.3 cm (71\")     Estimated body mass index is 25.83 kg/m² as calculated from the following:    Height as of this encounter: 180.3 cm (71\").    Weight as of this encounter: 84 kg (185 lb 3.2 oz).    BMI is >= 25 and <30. (Overweight) The following options were offered after discussion;: exercise counseling/recommendations and nutrition counseling/recommendations      Does the patient have evidence of cognitive impairment? No    Lab Results   Component Value Date    TRIG 58 2024    HDL 65 (H) 2024    LDL 60 2024    VLDL 12 2024    HGBA1C 5.50 2024        HEALTH RISK ASSESSMENT    Smoking Status:  Social History     Tobacco Use   Smoking Status Never   Smokeless Tobacco Never     Alcohol Consumption:  Social History     Substance and Sexual Activity   Alcohol Use Yes    Comment: drinks rarely; beer and liquor     Fall Risk Screen:    STEADI Fall Risk Assessment was completed, and patient is at LOW risk for falls.Assessment completed on:2024    Depression Screenin/24/2024    10:36 AM   PHQ-2/PHQ-9 Depression Screening   Little " Interest or Pleasure in Doing Things 1-->several days   Feeling Down, Depressed or Hopeless 1-->several days   Trouble Falling or Staying Asleep, or Sleeping Too Much 1-->several days   Feeling Tired or Having Little Energy 1-->several days   Poor Appetite or Overeating 0-->not at all   Feeling Bad about Yourself - or that You are a Failure or Have Let Yourself or Your Family Down 0-->not at all   Trouble Concentrating on Things, Such as Reading the Newspaper or Watching Television 0-->not at all   Moving or Speaking So Slowly that Other People Could Have Noticed? Or the Opposite - Being So Fidgety 0-->not at all   Thoughts that You Would be Better Off Dead or of Hurting Yourself in Some Way 0-->not at all   PHQ-9: Brief Depression Severity Measure Score 4   If You Checked Off Any Problems, How Difficult Have These Problems Made It For You to Do Your Work, Take Care of Things at Home, or Get Along with Other People? somewhat difficult       Health Habits and Functional and Cognitive Screenin/24/2024    10:35 AM   Functional & Cognitive Status   Do you have difficulty preparing food and eating? No   Do you have difficulty bathing yourself, getting dressed or grooming yourself? No   Do you have difficulty using the toilet? No   Do you have difficulty moving around from place to place? No   Do you have trouble with steps or getting out of a bed or a chair? No   Current Diet Well Balanced Diet   Dental Exam Up to date   Eye Exam Not up to date   Exercise (times per week) 2 times per week   Current Exercises Include Aerobics;Cardiovascular Workout;Weightlifting   Do you need help using the phone?  No   Are you deaf or do you have serious difficulty hearing?  No   Do you need help to go to places out of walking distance? No   Do you need help shopping? No   Do you need help preparing meals?  No   Do you need help with housework?  No   Do you need help with laundry? No   Do you need help taking your medications?  No   Do you need help managing money? No   Have you felt unusual stress, anger or loneliness in the last month? Yes   Who do you live with? Spouse   If you need help, do you have trouble finding someone available to you? No   Have you been bothered in the last four weeks by sexual problems? Yes   Do you have difficulty concentrating, remembering or making decisions? No       Age-appropriate Screening Schedule:  Refer to the list below for future screening recommendations based on patient's age, sex and/or medical conditions. Orders for these recommended tests are listed in the plan section. The patient has been provided with a written plan.    Health Maintenance   Topic Date Due    TDAP/TD VACCINES (1 - Tdap) Never done    ZOSTER VACCINE (1 of 2) Never done    RSV Vaccine - Adults (1 - 1-dose 60+ series) Never done    COVID-19 Vaccine (11 - 2023-24 season) 09/01/2023    INFLUENZA VACCINE  08/01/2024    LIPID PANEL  04/17/2025    ANNUAL WELLNESS VISIT  04/24/2025    BMI FOLLOWUP  04/24/2025    COLORECTAL CANCER SCREENING  03/20/2028    HEPATITIS C SCREENING  Completed    Pneumococcal Vaccine 65+  Completed                  CMS Preventative Services Quick Reference  Risk Factors Identified During Encounter  Immunizations Discussed/Encouraged: Tdap, Shingrix, COVID19, and RSV (Respiratory Syncytial Virus)  The above risks/problems have been discussed with the patient.  Pertinent information has been shared with the patient in the After Visit Summary.  An After Visit Summary and PPPS were made available to the patient.    Follow Up:   Next Medicare Wellness visit to be scheduled in 1 year.       Additional E&M Note during same encounter follows:  Patient has multiple medical problems which are significant and separately identifiable that require additional work above and beyond the Medicare Wellness Visit.      Chief Complaint  Medicare Wellness-subsequent (Hypertension. He states last night his blood pressure was 160.  ")    Subjective        HPI  Reji Mcmillan is also being seen today for follow-up of insomnia, anxiety, hypothyroidism and hypertension.  Sleeps all night on the Ambien CD.  Positive for fatigue and occasional dizziness with standing.  Patient is complaining of a swishing sound in his ears when he lies down.  Recent labs reviewed. SBP at home reported to be 160.     Specialist include:  Dr. Gutiérrez for CKD-no medication changes, urology for prostate and Dr. Mata for CAD and hyperlipidemia         Objective   Vital Signs:  /40 (BP Location: Right arm, Patient Position: Sitting, Cuff Size: Adult)   Pulse 74   Temp 97.3 °F (36.3 °C) (Temporal)   Ht 180.3 cm (71\")   Wt 84 kg (185 lb 3.2 oz)   SpO2 98%   BMI 25.83 kg/m²     Physical Exam  Vitals reviewed.   Constitutional:       General: He is not in acute distress.  HENT:      Head: Normocephalic and atraumatic.      Right Ear: There is impacted cerumen.      Left Ear: Tympanic membrane and ear canal normal.   Eyes:      Conjunctiva/sclera: Conjunctivae normal.   Neck:      Vascular: No carotid bruit.   Cardiovascular:      Rate and Rhythm: Normal rate and regular rhythm.      Heart sounds: Normal heart sounds. No murmur heard.  Pulmonary:      Effort: Pulmonary effort is normal.      Breath sounds: Normal breath sounds. No wheezing, rhonchi or rales.   Abdominal:      General: There is no distension.      Palpations: Abdomen is soft. There is no mass.      Tenderness: There is no abdominal tenderness.   Musculoskeletal:      Right lower leg: No edema.      Left lower leg: No edema.   Lymphadenopathy:      Cervical: No cervical adenopathy.   Skin:     General: Skin is warm and dry.      Coloration: Skin is not jaundiced or pale.   Neurological:      General: No focal deficit present.      Mental Status: He is alert.   Psychiatric:         Mood and Affect: Mood normal.         Thought Content: Thought content normal.          The following data was reviewed " by: CARSON Gleason on 04/24/2024:  Common labs          11/2/2023    09:38 3/29/2024    12:48 4/17/2024    08:53   Common Labs   Glucose 96  90  103    BUN 15  19  16    Creatinine 1.38  1.60  1.50    Sodium 139  140  140    Potassium 4.5  4.7  4.3    Chloride 102  103  105    Calcium 8.9  9.1  7.6    Albumin 3.8  4.2  4.1    Total Bilirubin 0.5   0.5    Alkaline Phosphatase 69   52    AST (SGOT) 14   11    ALT (SGPT) 11   13    WBC 6.10  6.10  5.21    Hemoglobin 12.3  13.2  13.0    Hematocrit 36.8  39.2  39.1    Platelets 326  252  240    Total Cholesterol 113   137    Triglycerides 38   58    HDL Cholesterol 51   65    LDL Cholesterol  52   60    Hemoglobin A1C 5.50   5.50        Ear Cerumen Removal    Date/Time: 4/24/2024 10:16 AM    Performed by: Kenzie Mccloud APRN  Authorized by: Kenzie Mccloud APRN  Location details: right ear  Patient tolerance: patient tolerated the procedure well with no immediate complications  Procedure type: instrumentation, irrigation             Assessment and Plan   Diagnoses and all orders for this visit:    1. Encounter for subsequent annual wellness visit (AWV) in Medicare patient (Primary)    2. Primary insomnia  -     Ambulatory Referral to Psychiatry    3. Anxiety and depression  -     Ambulatory Referral to Psychiatry    4. Caregiver burden  -     Ambulatory Referral to Psychiatry    5. Hypothyroidism, unspecified type  -     TSH; Future  -     T4, Free; Future    6. Essential hypertension  -     CBC & Differential; Future  -     Comprehensive Metabolic Panel; Future  -     Vitamin B12; Future  -     Folate; Future  -     Magnesium; Future    7. Mixed hyperlipidemia  -     Lipid Panel; Future    8. Iron deficiency anemia, unspecified iron deficiency anemia type  -     Iron; Future    9. Vitamin D deficiency  -     Vitamin D,25-Hydroxy; Future    10. Impaired fasting glucose  -     Hemoglobin A1c; Future    11. Pulsatile tinnitus of right ear  -     Vascular  Screening (Carotid) CAR; Future    12. Dizziness and giddiness  -     Vascular Screening (Carotid) CAR; Future    13. Impacted cerumen of right ear  -     Ear Cerumen Removal      Annual exam: Care gaps reviewed.    Insomnia: Insurance requesting to change to zolpidem , trazodone or ramelteon. He has already tried zolpidem and it does not work for him. Sleeps through the night on zolpidem CR 12.5 mg nightly and to continue. Recommend discussing with psychiatry.  Referral. REENA today.     Anxiety and depression: Patient is not well controlled on Paxil 10 mg daily. Feels he has caregiver fatigue.  Patient request referral.    Caregiver burden: Referral.      Hypothyroidism: TSH and T4 normal continue on levothyroxine 25 mcg daily and to continue.     Hypertension: Blood pressure controlled on amlodipine 5 mg daily and losartan 25 mg daily and to continue.    Hyperlipidemia: Lipid panel improved with medication and continue.     Iron deficiency anemia: Levels are normal.  Monitor.      Vitamin D deficiency: Level improved taking vitamin D 2000 units a day.      Elevated blood sugar: HA1C is normal. Monitor.     Pulsatile tinnitus of right ear and dizziness: Order for carotid scan and discussed with the patient.          Follow Up   Return in about 6 months (around 10/24/2024) for Recheck.  Patient was given instructions and counseling regarding his condition or for health maintenance advice. Please see specific information pulled into the AVS if appropriate.

## 2024-04-24 ENCOUNTER — OFFICE VISIT (OUTPATIENT)
Dept: INTERNAL MEDICINE | Age: 79
End: 2024-04-24
Payer: MEDICARE

## 2024-04-24 VITALS
BODY MASS INDEX: 25.93 KG/M2 | DIASTOLIC BLOOD PRESSURE: 40 MMHG | HEIGHT: 71 IN | SYSTOLIC BLOOD PRESSURE: 100 MMHG | TEMPERATURE: 97.3 F | OXYGEN SATURATION: 98 % | HEART RATE: 74 BPM | WEIGHT: 185.2 LBS

## 2024-04-24 DIAGNOSIS — D50.9 IRON DEFICIENCY ANEMIA, UNSPECIFIED IRON DEFICIENCY ANEMIA TYPE: ICD-10-CM

## 2024-04-24 DIAGNOSIS — F41.9 ANXIETY AND DEPRESSION: Chronic | ICD-10-CM

## 2024-04-24 DIAGNOSIS — E55.9 VITAMIN D DEFICIENCY: ICD-10-CM

## 2024-04-24 DIAGNOSIS — Z00.00 ENCOUNTER FOR SUBSEQUENT ANNUAL WELLNESS VISIT (AWV) IN MEDICARE PATIENT: Primary | ICD-10-CM

## 2024-04-24 DIAGNOSIS — R73.01 IMPAIRED FASTING GLUCOSE: ICD-10-CM

## 2024-04-24 DIAGNOSIS — H61.21 IMPACTED CERUMEN OF RIGHT EAR: ICD-10-CM

## 2024-04-24 DIAGNOSIS — H93.A1 PULSATILE TINNITUS OF RIGHT EAR: ICD-10-CM

## 2024-04-24 DIAGNOSIS — E78.2 MIXED HYPERLIPIDEMIA: Chronic | ICD-10-CM

## 2024-04-24 DIAGNOSIS — R42 DIZZINESS AND GIDDINESS: ICD-10-CM

## 2024-04-24 DIAGNOSIS — F32.A ANXIETY AND DEPRESSION: Chronic | ICD-10-CM

## 2024-04-24 DIAGNOSIS — Z63.6 CAREGIVER BURDEN: ICD-10-CM

## 2024-04-24 DIAGNOSIS — E03.9 HYPOTHYROIDISM, UNSPECIFIED TYPE: Chronic | ICD-10-CM

## 2024-04-24 DIAGNOSIS — F51.01 PRIMARY INSOMNIA: Chronic | ICD-10-CM

## 2024-04-24 DIAGNOSIS — I10 ESSENTIAL HYPERTENSION: Chronic | ICD-10-CM

## 2024-04-24 PROCEDURE — 1160F RVW MEDS BY RX/DR IN RCRD: CPT | Performed by: NURSE PRACTITIONER

## 2024-04-24 PROCEDURE — G0439 PPPS, SUBSEQ VISIT: HCPCS | Performed by: NURSE PRACTITIONER

## 2024-04-24 PROCEDURE — 99214 OFFICE O/P EST MOD 30 MIN: CPT | Performed by: NURSE PRACTITIONER

## 2024-04-24 PROCEDURE — 3074F SYST BP LT 130 MM HG: CPT | Performed by: NURSE PRACTITIONER

## 2024-04-24 PROCEDURE — 1170F FXNL STATUS ASSESSED: CPT | Performed by: NURSE PRACTITIONER

## 2024-04-24 PROCEDURE — 69210 REMOVE IMPACTED EAR WAX UNI: CPT | Performed by: NURSE PRACTITIONER

## 2024-04-24 PROCEDURE — 3078F DIAST BP <80 MM HG: CPT | Performed by: NURSE PRACTITIONER

## 2024-04-24 RX ORDER — RAMELTEON 8 MG/1
8 TABLET ORAL NIGHTLY
Qty: 90 TABLET | Refills: 0 | Status: CANCELLED | OUTPATIENT
Start: 2024-04-24

## 2024-04-24 RX ORDER — TRAZODONE HYDROCHLORIDE 50 MG/1
50 TABLET ORAL NIGHTLY
Qty: 90 TABLET | Refills: 0 | Status: CANCELLED | OUTPATIENT
Start: 2024-04-24

## 2024-04-24 SDOH — SOCIAL STABILITY - SOCIAL INSECURITY: DEPENDENT RELATIVE NEEDING CARE AT HOME: Z63.6

## 2024-04-26 ENCOUNTER — TELEPHONE (OUTPATIENT)
Dept: INTERNAL MEDICINE | Age: 79
End: 2024-04-26
Payer: MEDICARE

## 2024-04-26 NOTE — TELEPHONE ENCOUNTER
Susan Flaherty from [facility] wanting to inform PCP Zolpidem is not covered, covered alternatives include: Trazodone HCL 50, 100, 150, 300  Ramelteon tab 8MG  Zolpidem 5Mg , 10 MG   Per PCP CARSON Gleason- alternatives have been tried and do not work, if pt wants a change in medication he will have to call and request it directly.

## 2024-04-30 ENCOUNTER — TRANSCRIBE ORDERS (OUTPATIENT)
Dept: ADMINISTRATIVE | Facility: HOSPITAL | Age: 79
End: 2024-04-30
Payer: MEDICARE

## 2024-05-02 ENCOUNTER — TELEPHONE (OUTPATIENT)
Dept: PSYCHIATRY | Facility: CLINIC | Age: 79
End: 2024-05-02
Payer: MEDICARE

## 2024-05-04 DIAGNOSIS — H93.A9 PULSATILE TINNITUS: ICD-10-CM

## 2024-05-04 DIAGNOSIS — I10 ESSENTIAL HYPERTENSION: ICD-10-CM

## 2024-05-04 DIAGNOSIS — H93.A1 PULSATILE TINNITUS OF RIGHT EAR: Primary | ICD-10-CM

## 2024-05-04 DIAGNOSIS — R42 DIZZINESS AND GIDDINESS: ICD-10-CM

## 2024-05-07 DIAGNOSIS — I10 ESSENTIAL HYPERTENSION: ICD-10-CM

## 2024-05-07 RX ORDER — AMLODIPINE BESYLATE 5 MG/1
5 TABLET ORAL DAILY
Qty: 90 TABLET | Refills: 3 | Status: SHIPPED | OUTPATIENT
Start: 2024-05-07

## 2024-05-08 ENCOUNTER — TELEPHONE (OUTPATIENT)
Dept: INTERNAL MEDICINE | Age: 79
End: 2024-05-08
Payer: MEDICARE

## 2024-05-30 ENCOUNTER — OFFICE VISIT (OUTPATIENT)
Dept: CARDIOLOGY | Facility: CLINIC | Age: 79
End: 2024-05-30
Payer: MEDICARE

## 2024-05-30 VITALS
WEIGHT: 185 LBS | HEART RATE: 61 BPM | SYSTOLIC BLOOD PRESSURE: 139 MMHG | BODY MASS INDEX: 25.9 KG/M2 | HEIGHT: 71 IN | DIASTOLIC BLOOD PRESSURE: 55 MMHG

## 2024-05-30 DIAGNOSIS — E78.2 MIXED HYPERLIPIDEMIA: ICD-10-CM

## 2024-05-30 DIAGNOSIS — I25.810 CORONARY ARTERY DISEASE INVOLVING CORONARY BYPASS GRAFT OF NATIVE HEART WITHOUT ANGINA PECTORIS: Primary | ICD-10-CM

## 2024-05-30 DIAGNOSIS — I10 ESSENTIAL HYPERTENSION: ICD-10-CM

## 2024-05-30 PROCEDURE — 3075F SYST BP GE 130 - 139MM HG: CPT | Performed by: INTERNAL MEDICINE

## 2024-05-30 PROCEDURE — 99214 OFFICE O/P EST MOD 30 MIN: CPT | Performed by: INTERNAL MEDICINE

## 2024-05-30 PROCEDURE — 3078F DIAST BP <80 MM HG: CPT | Performed by: INTERNAL MEDICINE

## 2024-05-30 NOTE — PROGRESS NOTES
"Chief Complaint  CAD s/p CABG and 6 month follow up     Subjective    Patient is doing well has not been having any anginal chest pain or change in breathing ability.  Has been under increased stress due to deteriorating health of his wife due to dementia and having to care for.  Past Medical History:   Diagnosis Date    Allergic rhinitis due to allergen     Benign prostatic hyperplasia     CAD s/p CABG 03/30/2022    Chest pain     Chronic allergic rhinitis     CKD (chronic kidney disease) stage 3, GFR 30-59 ml/min     Eczema     Erectile dysfunction 10/19/2020    GERD (gastroesophageal reflux disease)     Heart disease     High blood pressure     High cholesterol     Hypercholesteremia     Hypertension     Controlled    Hypothyroidism     Orthostatic hypotension     Polio     Prostate disorder          Current Outpatient Medications:     amLODIPine (NORVASC) 5 MG tablet, TAKE 1 TABLET DAILY, Disp: 90 tablet, Rfl: 3    aspirin 81 MG EC tablet, Take 1 tablet by mouth Daily., Disp: , Rfl:     atorvastatin (LIPITOR) 80 MG tablet, TAKE 1 TABLET DAILY, Disp: 90 tablet, Rfl: 3    B-D TB SYRINGE 1CC/27GX1/2\" 27G X 1/2\" 1 ML misc, USE FOR XIAFLEX INJECTION, Disp: 4 each, Rfl: 3    cetirizine (zyrTEC) 10 MG tablet, Take 1 tablet by mouth Daily., Disp: , Rfl:     Cholecalciferol 50 MCG (2000 UT) capsule, Daily., Disp: , Rfl:     Daridorexant HCl (Quviviq) 50 MG tablet, Take 1 tablet by mouth every night at bedtime., Disp: 30 tablet, Rfl: 5    Dupilumab (Dupixent) 300 MG/2ML solution pen-injector, Inject 1 mL under the skin into the appropriate area as directed Every 14 (Fourteen) Days., Disp: , Rfl:     ketoconazole (NIZORAL) 2 % cream, Apply 1 application  topically to the appropriate area as directed Daily., Disp: 30 g, Rfl: 1    lansoprazole (PREVACID) 30 MG capsule, TAKE 1 CAPSULE DAILY, Disp: 90 capsule, Rfl: 3    levothyroxine (SYNTHROID, LEVOTHROID) 25 MCG tablet, TAKE 1 TABLET DAILY, Disp: 90 tablet, Rfl: 3    " losartan (COZAAR) 25 MG tablet, TAKE 1 TABLET DAILY, Disp: 90 tablet, Rfl: 3    PARoxetine (PAXIL) 10 MG tablet, TAKE 1 TABLET EVERY MORNING, Disp: 90 tablet, Rfl: 3    ranolazine (RANEXA) 500 MG 12 hr tablet, TAKE 1 TABLET TWICE A DAY, Disp: 180 tablet, Rfl: 3    tadalafil (CIALIS) 5 MG tablet, Take 1 tablet by mouth Daily As Needed for Erectile Dysfunction for up to 180 days., Disp: 90 tablet, Rfl: 1    tamsulosin (FLOMAX) 0.4 MG capsule 24 hr capsule, TAKE 1 CAPSULE DAILY 30 MINUTES AFTER DINNER MEAL, Disp: 90 capsule, Rfl: 3    vitamin D (ERGOCALCIFEROL) 1.25 MG (76863 UT) capsule capsule, Take 1 capsule by mouth 1 (One) Time Per Week. Take with a meal., Disp: 13 capsule, Rfl: 1    Xiaflex 0.9 MG injection, , Disp: , Rfl:     zolpidem CR (AMBIEN CR) 12.5 MG CR tablet, Take 1 tablet by mouth At Night As Needed for Sleep., Disp: 90 tablet, Rfl: 1    Current Facility-Administered Medications:     collagenase clostrid histolyt (XIAFLEX) injection 0.58 mg, 0.58 mg, Injection, Once, Mellisa Montes MD    collagenase clostrid histolyt (XIAFLEX) injection 0.58 mg, 0.58 mg, Injection, Once, Mellisa Montes MD    collagenase clostrid histolyt (XIAFLEX) injection 0.58 mg, 0.58 mg, Injection, Once, Mellisa Montes MD    collagenase clostrid histolyt (XIAFLEX) injection 0.58 mg, 0.58 mg, Injection, Once, Mellisa Montes MD    collagenase clostrid histolyt (XIAFLEX) injection 0.58 mg, 0.58 mg, Injection, Once, Mellisa Montes MD    There are no discontinued medications.  Allergies   Allergen Reactions    Sulfa Antibiotics Unknown - Low Severity     Flu like symptoms caused from taking medication        Social History     Tobacco Use    Smoking status: Never    Smokeless tobacco: Never   Vaping Use    Vaping status: Never Used   Substance Use Topics    Alcohol use: Yes     Comment: drinks rarely; beer and liquor    Drug use: Never       Family History   Problem Relation Age of Onset     "Hyperlipidemia Mother     Heart disease Mother     Hypertension Mother     Hyperlipidemia Father     Hypertension Father     Heart attack Father     Malig Hyperthermia Neg Hx         Objective     /55   Pulse 61   Ht 180.3 cm (71\")   Wt 83.9 kg (185 lb)   BMI 25.80 kg/m²       Physical Exam    General Appearance:   no acute distress  Alert and oriented x3  HENT:   lips not cyanotic  Atraumatic  Neck:  No jvd   supple  Respiratory:  no respiratory distress  normal breath sounds  no rales  Cardiovascular:  Regular rate and rhythm  no S3, no S4   no murmur  no rub  Extremities  No cyanosis  lower extremity edema: none    Skin:   warm, dry  No rashes      Result Review :     No results found for: \"PROBNP\"  CMP          11/2/2023    09:38 3/29/2024    12:48 4/17/2024    08:53   CMP   Glucose 96  90  103    BUN 15  19  16    Creatinine 1.38  1.60  1.50    EGFR 52.7  44.1  47.7    Sodium 139  140  140    Potassium 4.5  4.7  4.3    Chloride 102  103  105    Calcium 8.9  9.1  7.6    Total Protein 6.8   7.1    Albumin 3.8  4.2  4.1    Globulin 3.0   3.0    Total Bilirubin 0.5   0.5    Alkaline Phosphatase 69   52    AST (SGOT) 14   11    ALT (SGPT) 11   13    Albumin/Globulin Ratio 1.3   1.4    BUN/Creatinine Ratio 10.9  11.9  10.7    Anion Gap 11.3  11.2  8.0      CBC w/diff          11/2/2023    09:38 3/29/2024    12:48 4/17/2024    08:53   CBC w/Diff   WBC 6.10  6.10  5.21    RBC 4.47  4.81  4.85    Hemoglobin 12.3  13.2  13.0    Hematocrit 36.8  39.2  39.1    MCV 82.3  81.5  80.6    MCH 27.5  27.4  26.8    MCHC 33.4  33.7  33.2    RDW 13.0  13.8  13.8    Platelets 326  252  240    Neutrophil Rel % 68.5  68.7  65.4    Immature Granulocyte Rel % 0.5  0.2  0.4    Lymphocyte Rel % 16.2  19.7  20.9    Monocyte Rel % 11.1  10.0  10.6    Eosinophil Rel % 3.4  1.1  2.5    Basophil Rel % 0.3  0.3  0.2       Lab Results   Component Value Date    TSH 3.480 04/17/2024      Lab Results   Component Value Date    FREET4 1.02 " "04/17/2024      No results found for: \"DDIMERQUANT\"  Magnesium   Date Value Ref Range Status   04/17/2024 1.9 1.6 - 2.4 mg/dL Final      No results found for: \"DIGOXIN\"   No results found for: \"TROPONINT\"        Lipid Panel          11/2/2023    09:38 4/17/2024    08:53   Lipid Panel   Total Cholesterol 113  137    Triglycerides 38  58    HDL Cholesterol 51  65    VLDL Cholesterol 10  12    LDL Cholesterol  52  60    LDL/HDL Ratio 1.07  0.93      No results found for: \"POCTROP\"                   Diagnoses and all orders for this visit:    1. CAD s/p CABG (Primary)  Assessment & Plan:  Patient is doing well no ongoing angina continue with chronic aspirin 81 mg daily        2. Mixed hyperlipidemia  Assessment & Plan:  Continue with Lipitor 80 nightly LDL goal       3. Essential hypertension            Follow Up     Return in about 6 months (around 11/30/2024) for Follow with Varsha Jang, EKG with F/U.          Patient was given instructions and counseling regarding his condition or for health maintenance advice. Please see specific information pulled into the AVS if appropriate.       "

## 2024-06-07 ENCOUNTER — HOSPITAL ENCOUNTER (OUTPATIENT)
Dept: CARDIOLOGY | Facility: HOSPITAL | Age: 79
Discharge: HOME | End: 2024-06-07
Payer: MEDICARE

## 2024-06-07 DIAGNOSIS — I10 ESSENTIAL HYPERTENSION: ICD-10-CM

## 2024-06-07 DIAGNOSIS — R42 DIZZINESS AND GIDDINESS: ICD-10-CM

## 2024-06-07 DIAGNOSIS — H93.A9 PULSATILE TINNITUS: ICD-10-CM

## 2024-06-07 LAB
BH CV XLRA MEAS LEFT CAROTID BULB EDV: 9.7 CM/SEC
BH CV XLRA MEAS LEFT CAROTID BULB PSV: 63 CM/SEC
BH CV XLRA MEAS LEFT DIST CCA EDV: 11 CM/SEC
BH CV XLRA MEAS LEFT DIST CCA PSV: 87.6 CM/SEC
BH CV XLRA MEAS LEFT DIST ICA EDV: 15.6 CM/SEC
BH CV XLRA MEAS LEFT DIST ICA PSV: 88.3 CM/SEC
BH CV XLRA MEAS LEFT ICA/CCA RATIO: 0.9
BH CV XLRA MEAS LEFT MID ICA EDV: 18.8 CM/SEC
BH CV XLRA MEAS LEFT MID ICA PSV: 77.9 CM/SEC
BH CV XLRA MEAS LEFT PROX CCA EDV: 11.6 CM/SEC
BH CV XLRA MEAS LEFT PROX CCA PSV: 91.8 CM/SEC
BH CV XLRA MEAS LEFT PROX ECA EDV: 9.1 CM/SEC
BH CV XLRA MEAS LEFT PROX ECA PSV: 92.2 CM/SEC
BH CV XLRA MEAS LEFT PROX ICA EDV: 13 CM/SEC
BH CV XLRA MEAS LEFT PROX ICA PSV: 77.2 CM/SEC
BH CV XLRA MEAS LEFT VERTEBRAL A EDV: 10.4 CM/SEC
BH CV XLRA MEAS LEFT VERTEBRAL A PSV: 38.3 CM/SEC
BH CV XLRA MEAS RIGHT CAROTID BULB EDV: 15.5 CM/SEC
BH CV XLRA MEAS RIGHT CAROTID BULB PSV: 97.5 CM/SEC
BH CV XLRA MEAS RIGHT DIST CCA EDV: 17.4 CM/SEC
BH CV XLRA MEAS RIGHT DIST CCA PSV: 101.8 CM/SEC
BH CV XLRA MEAS RIGHT DIST ICA EDV: 18.6 CM/SEC
BH CV XLRA MEAS RIGHT DIST ICA PSV: 78.9 CM/SEC
BH CV XLRA MEAS RIGHT ICA/CCA RATIO: 0.8
BH CV XLRA MEAS RIGHT MID ICA EDV: 19.3 CM/SEC
BH CV XLRA MEAS RIGHT MID ICA PSV: 90 CM/SEC
BH CV XLRA MEAS RIGHT PROX CCA EDV: 11.1 CM/SEC
BH CV XLRA MEAS RIGHT PROX CCA PSV: 89.4 CM/SEC
BH CV XLRA MEAS RIGHT PROX ECA EDV: 9.3 CM/SEC
BH CV XLRA MEAS RIGHT PROX ECA PSV: 113 CM/SEC
BH CV XLRA MEAS RIGHT PROX ICA EDV: 16.1 CM/SEC
BH CV XLRA MEAS RIGHT PROX ICA PSV: 80.1 CM/SEC
BH CV XLRA MEAS RIGHT VERTEBRAL A EDV: 9.5 CM/SEC
BH CV XLRA MEAS RIGHT VERTEBRAL A PSV: 35 CM/SEC
LEFT ARM BP: NORMAL MMHG
RIGHT ARM BP: NORMAL MMHG

## 2024-06-07 PROCEDURE — 93880 EXTRACRANIAL BILAT STUDY: CPT

## 2024-06-11 NOTE — PATIENT INSTRUCTIONS
1.  Please return to clinic at your next scheduled visit.  Contact the clinic ([phone]) at least 24 hours prior in the event you need to cancel.  2.  Do no harm to yourself or others.    3.  Avoid alcohol and drugs.    4.  Take all medications as prescribed.  Please contact the clinic with any concerns. If you are in need of medication refills, please call the clinic at [phone].    5. Should you want to get in touch with your provider, Dr. Teddy Montes, please utilize [employer] or contact the office ([phone]), and staff will be able to page Dr. Montes directly.  6.  In the event you have personal crisis, contact the following crisis numbers: Suicide Prevention Hotline 1-310.932.4201; [facility] Helpline 3-827-370-EDWARD; [facility] Emergency Room [phone]; text HELLO to 407872; or 923.

## 2024-06-11 NOTE — PROGRESS NOTES
"Subjective   Reji Mcmillan is a 78 y.o. male who presents today for initial evaluation     Referring Provider:  Kenzie Mccloud, CARSON  [address] [facility]  [address]  [place] [address]    Chief Complaint:  depression    History of Present Illness:     06/12/2024: INITIAL VISIT Chart review:     Dewey: Ambien CR 12.5 mg nightly chronically  Care Everywhere: Several non behavioral health notes pertaining to chronic kidney disease    Psychotropic medication chart review:  Present:  Ambien CR 12.5 mg at night    Previously:  Paxil 10 mg a day    EKG: November 2023: 55, sinus, QTc 419.  Procedures: none  Head imaging: none  Labs: April 2024: Creatinine 1.5, glucose 103, calcium 7.6, otherwise reassuring CMP.  Reassuring A1c, thyroid studies, lipids, vitamin D, CBC.  Total saturation  Iron profile is slightly low at 18.  UDS entirely negative in April 2023.  Initial Chart Review Notes: Seen by primary care in April.  PHQ-9 is a 4.  Seen as a follow-up for anxiety, insomnia.  Sleeping well on Ambien.  Having some fatigue.  Referred to us for anxiety and depression.      Patient Psychotherapy Notes:  Patient goals:  Misc:  Stage IIIb chronic kidney disease  Hx open heart surgery, 2x bypass. 2013.      Chart Review By Dates:      Planning:      VISITS/APPOINTMENTS (BELOW):    \"Reji\"       06/12/2024: In person.  Interview:  His/Her Story: \"Ambien helps, but it doesn't hold me down.\"  P14, G7  Racing thoughts at night  Friends have said I've changed, I'm more \"low\" and not as enthusiastic as I used to be  Has been on paxil for more than 3 years   54 years, wife has dementia, additional stressor  She's losing function  Hx open heart surgery, 2x bypass.  Depression/Mood:  Depressed mood, anhedonia,   poor energy, poor concentration,   insomnia.  Seasonal pattern: def  Severity: Moderate  Duration: Many years  Anxiety:  Uncontrolled worrying, muscle tension, fatigue, poor concentration, feeling on " edge  irritability, insomnia.  Severity: Moderate  Duration: Many years  Panic attacks: n  Psych ROS: Positive for depression, anxiety.  Negative for psychosis and shweta.  ADHD: def  PTSD: def  No SI HI AVH.  Medication compliant: y    Access to Firearms:  yes, locked away    PHQ-9 Depression Screening  PHQ-9 Total Score: 14    Little interest or pleasure in doing things? 2-->more than half the days   Feeling down, depressed, or hopeless? 2-->more than half the days   Trouble falling or staying asleep, or sleeping too much? 3-->nearly every day   Feeling tired or having little energy? 2-->more than half the days   Poor appetite or overeating? 2-->more than half the days   Feeling bad about yourself - or that you are a failure or have let yourself or your family down? 1-->several days   Trouble concentrating on things, such as reading the newspaper or watching television? 1-->several days   Moving or speaking so slowly that other people could have noticed? Or the opposite - being so fidgety or restless that you have been moving around a lot more than usual? 1-->several days   Thoughts that you would be better off dead, or of hurting yourself in some way? 0-->not at all   PHQ-9 Total Score 14     KIMBERLY-7  Feeling nervous, anxious or on edge: Several days  Not being able to stop or control worrying: Several days  Worrying too much about different things: Several days  Trouble Relaxing: Several days  Being so restless that it is hard to sit still: Several days  Feeling afraid as if something awful might happen: Several days  Becoming easily annoyed or irritable: Several days  KIMBERLY 7 Total Score: 7  If you checked any problems, how difficult have these problems made it for you to do your work, take care of things at home, or get along with other people: Somewhat difficult    Past Surgical History:  Past Surgical History:   Procedure Laterality Date    CARDIAC SURGERY      CHOLECYSTECTOMY      COLONOSCOPY      COLONOSCOPY  "N/A 3/20/2023    Procedure: COLONOSCOPY;  Surgeon: Jacques Dc MD;  Location: [facility] ENDOSCOPY;  Service: General;  Laterality: N/A;  diverticulosis, hemorrhoids    CORONARY ARTERY BYPASS GRAFT      cabbage x2    DENTAL PROCEDURE      HERNIA REPAIR      TOE SURGERY Left        Problem List:  Patient Active Problem List   Diagnosis    Essential hypertension    Mixed hyperlipidemia    Hypothyroidism    Vitamin D deficiency    Anxiety and depression    Insomnia    Anemia    Chronic low back pain    Nodular hyperplasia of prostate gland    Elevated prostate specific antigen (PSA)    Disorder of arteries and arterioles, unspecified    Stage 3 chronic kidney disease    CAD s/p CABG    Peyronie disease    Vasculogenic erectile dysfunction    History of elevated PSA    Screening for malignant neoplasm of colon    History of colonic polyps    Other male erectile dysfunction       Allergy:   Allergies   Allergen Reactions    Sulfa Antibiotics Unknown - Low Severity     Flu like symptoms caused from taking medication        Discontinued Medications:  Medications Discontinued During This Encounter   Medication Reason    Cholecalciferol 50 MCG (2000 UT) capsule *Therapy completed    Daridorexant HCl (Quviviq) 50 MG tablet *Therapy completed    PARoxetine (PAXIL) 10 MG tablet Not Efficacious       Current Medications:   Current Outpatient Medications   Medication Sig Dispense Refill    amLODIPine (NORVASC) 5 MG tablet TAKE 1 TABLET DAILY 90 tablet 3    aspirin 81 MG EC tablet Take 1 tablet by mouth Daily.      atorvastatin (LIPITOR) 80 MG tablet TAKE 1 TABLET DAILY 90 tablet 3    B-D TB SYRINGE 1CC/27GX1/2\" 27G X 1/2\" 1 ML misc USE FOR XIAFLEX INJECTION 4 each 3    cetirizine (zyrTEC) 10 MG tablet Take 1 tablet by mouth Daily.      Dupilumab (Dupixent) 300 MG/2ML solution pen-injector Inject 1 mL under the skin into the appropriate area as directed Every 14 (Fourteen) Days.      ketoconazole (NIZORAL) 2 % cream Apply 1 " application  topically to the appropriate area as directed Daily. 30 g 1    lansoprazole (PREVACID) 30 MG capsule TAKE 1 CAPSULE DAILY 90 capsule 3    levothyroxine (SYNTHROID, LEVOTHROID) 25 MCG tablet TAKE 1 TABLET DAILY 90 tablet 3    losartan (COZAAR) 25 MG tablet TAKE 1 TABLET DAILY 90 tablet 3    ranolazine (RANEXA) 500 MG 12 hr tablet TAKE 1 TABLET TWICE A  tablet 3    tadalafil (CIALIS) 5 MG tablet Take 1 tablet by mouth Daily As Needed for Erectile Dysfunction for up to 180 days. 90 tablet 1    tamsulosin (FLOMAX) 0.4 MG capsule 24 hr capsule TAKE 1 CAPSULE DAILY 30 MINUTES AFTER DINNER MEAL 90 capsule 3    vitamin D (ERGOCALCIFEROL) 1.25 MG (65104 UT) capsule capsule Take 1 capsule by mouth 1 (One) Time Per Week. Take with a meal. 13 capsule 1    Xiaflex 0.9 MG injection       zolpidem CR (AMBIEN CR) 12.5 MG CR tablet Take 1 tablet by mouth At Night As Needed for Sleep. 90 tablet 1    FLUoxetine (PROzac) 10 MG capsule Take 1 capsule by mouth Daily. 90 capsule 1     Current Facility-Administered Medications   Medication Dose Route Frequency Provider Last Rate Last Admin    collagenase clostrid histolyt (XIAFLEX) injection 0.58 mg  0.58 mg Injection Once Mellisa Montes MD        collagenase clostrid histolyt (XIAFLEX) injection 0.58 mg  0.58 mg Injection Once Mellisa Montes MD        collagenase clostrid histolyt (XIAFLEX) injection 0.58 mg  0.58 mg Injection Once Mellisa Montes MD        collagenase clostrid histolyt (XIAFLEX) injection 0.58 mg  0.58 mg Injection Once Mellisa Montes MD        collagenase clostrid histolyt (XIAFLEX) injection 0.58 mg  0.58 mg Injection Once Mellisa Montes MD           Past Medical History:  Past Medical History:   Diagnosis Date    Allergic rhinitis due to allergen     Benign prostatic hyperplasia     CAD s/p CABG 03/30/2022    Chest pain     Chronic allergic rhinitis     CKD (chronic kidney disease) stage 3, GFR 30-59 ml/min      Eczema     Erectile dysfunction 10/19/2020    GERD (gastroesophageal reflux disease)     Heart disease     High blood pressure     High cholesterol     Hypercholesteremia     Hypertension     Controlled    Hypothyroidism     Orthostatic hypotension     Polio     Prostate disorder        Past Psychiatric History:  Began Treatment: PCP for years  Diagnoses: MDD  Psychiatrist:Denies  Therapist:Denies  Admission History:Denies  Medication Trials:    Paxil only, used to work    Self Harm: Denies  Suicide Attempts:Denies      Substance Abuse History:   Types: 1 airplane bottle 4x/wk  Withdrawal Symptoms:Denies  Longest Period Sober:Not Applicable   AA: Not applicable     Social History:  Martial Status:  Employed:No. 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    Drug use: Never    Sexual activity: Defer       Family History:   Suicide Attempts: Denies  Suicide Completions:Denies      Family History   Problem Relation Age of Onset    Dementia Mother     Anxiety disorder Mother     Hyperlipidemia Mother     Heart disease Mother     Hypertension Mother     Hyperlipidemia Father     Hypertension Father     Heart attack Father     Depression Sister     Anxiety disorder Sister     No Known Problems Brother     No Known Problems Maternal Aunt     No Known Problems Paternal Aunt     No Known Problems Maternal Uncle     No Known Problems Paternal Uncle     No Known Problems Maternal Grandfather     No Known Problems Maternal Grandmother     No Known Problems Paternal Grandfather     No Known Problems Paternal Grandmother     No Known Problems Cousin     No Known Problems  "Other     Malig Hyperthermia Neg Hx     ADD / ADHD Neg Hx     Alcohol abuse Neg Hx     Bipolar disorder Neg Hx     Drug abuse Neg Hx     OCD Neg Hx     Paranoid behavior Neg Hx     Schizophrenia Neg Hx     Seizures Neg Hx     Self-Injurious Behavior  Neg Hx     Suicide Attempts Neg Hx        Developmental History:       Childhood: Denies Abuse  High School:Completed  College: 4 years, education degree    Mental Status Exam  Appearance  : groomed, good eye contact, normal street clothes  Behavior  : pleasant and cooperative  Motor  : No abnormal  Speech  :normal rhythm, rate, volume, tone, not hyperverbal, not pressured, normal prosidy  Mood  : \"low mood\"  Affect  : mild depression, mood congruent, good variability  Thought Content  : negative suicidal ideations, negative homicidal ideations, negative obsessions  Perceptions  : negative auditory hallucinations, negative visual hallucinations  Thought Process  : linear  Insight/Judgement  : Fair/fair  Cognition  : grossly intact  Attention   : intact      Review of Systems:  Review of Systems   Constitutional:  Positive for diaphoresis. Negative for fatigue.   HENT:  Negative for drooling.    Eyes:  Negative for visual disturbance.   Respiratory:  Negative for cough and shortness of breath.    Cardiovascular:  Negative for chest pain, palpitations and leg swelling.   Gastrointestinal:  Negative for nausea and vomiting.   Endocrine: Negative for cold intolerance and heat intolerance.   Genitourinary:  Negative for difficulty urinating.   Musculoskeletal:  Negative for joint swelling.   Allergic/Immunologic: Negative for immunocompromised state.   Neurological:  Negative for dizziness, seizures, speech difficulty and numbness.       Physical Exam:  Physical Exam    Vital Signs:   /61   Pulse 61   Ht 177.8 cm (70\")   Wt 83.6 kg (184 lb 3.2 oz)   BMI 26.43 kg/m²      Lab Results:   Hospital Outpatient Visit on 06/07/2024   Component Date Value Ref Range Status "    Right arm BP 06/07/2024 134/71  mmHg Final    Left arm BP 06/07/2024 138/71  mmHg Final    Prox CCA PSV 06/07/2024 89.4  cm/sec Final    Prox CCA EDV 06/07/2024 11.1  cm/sec Final    Dist CCA PSV 06/07/2024 101.8  cm/sec Final    Dist CCA EDV 06/07/2024 17.4  cm/sec Final    Prox ICA PSV 06/07/2024 80.1  cm/sec Final    Prox ICA EDV 06/07/2024 16.1  cm/sec Final    Mid ICA PSV 06/07/2024 90.0  cm/sec Final    Mid ICA EDV 06/07/2024 19.3  cm/sec Final    Dist ICA PSV 06/07/2024 78.9  cm/sec Final    Dist ICA EDV 06/07/2024 18.6  cm/sec Final    Prox ECA PSV 06/07/2024 113.0  cm/sec Final    Prox ECA EDV 06/07/2024 9.3  cm/sec Final    Vertebral A PSV 06/07/2024 35.0  cm/sec Final    Vertebral A EDV 06/07/2024 9.5  cm/sec Final    Prox CCA PSV 06/07/2024 91.8  cm/sec Final    Prox CCA EDV 06/07/2024 11.6  cm/sec Final    Dist CCA PSV 06/07/2024 87.6  cm/sec Final    Dist CCA EDV 06/07/2024 11.0  cm/sec Final    Prox ICA PSV 06/07/2024 77.2  cm/sec Final    Prox ICA EDV 06/07/2024 13.0  cm/sec Final    Mid ICA PSV 06/07/2024 77.9  cm/sec Final    Mid ICA EDV 06/07/2024 18.8  cm/sec Final    Dist ICA PSV 06/07/2024 88.3  cm/sec Final    Dist ICA EDV 06/07/2024 15.6  cm/sec Final    Prox ECA PSV 06/07/2024 92.2  cm/sec Final    Prox ECA EDV 06/07/2024 9.1  cm/sec Final    Vertebral A PSV 06/07/2024 38.3  cm/sec Final    Vertebral A EDV 06/07/2024 10.4  cm/sec Final    XLRA MERRY RIGHT CAROTID BULB PSV 06/07/2024 97.5  cm/sec Final    XLRA MERRY RIGHT CAROTID BULB EDV 06/07/2024 15.5  cm/sec Final    XLRA MERRY LEFT CAROTID BULB PSV 06/07/2024 63.0  cm/sec Final    XLRA MERRY LEFT CAROTID BULB EDV 06/07/2024 9.7  cm/sec Final    ICA/CCA ratio 06/07/2024 0.80   Final    ICA/CCA ratio 06/07/2024 0.90   Final   Lab on 04/17/2024   Component Date Value Ref Range Status    Glucose 04/17/2024 103 (H)  65 - 99 mg/dL Final    BUN 04/17/2024 16  8 - 23 mg/dL Final    Creatinine 04/17/2024 1.50 (H)  0.76 - 1.27 mg/dL Final    Sodium  04/17/2024 140  136 - 145 mmol/L Final    Potassium 04/17/2024 4.3  3.5 - 5.2 mmol/L Final    Chloride 04/17/2024 105  98 - 107 mmol/L Final    CO2 04/17/2024 27.0  22.0 - 29.0 mmol/L Final    Calcium 04/17/2024 7.6 (L)  8.6 - 10.5 mg/dL Final    Total Protein 04/17/2024 7.1  6.0 - 8.5 g/dL Final    Albumin 04/17/2024 4.1  3.5 - 5.2 g/dL Final    ALT (SGPT) 04/17/2024 13  1 - 41 U/L Final    AST (SGOT) 04/17/2024 11  1 - 40 U/L Final    Alkaline Phosphatase 04/17/2024 52  39 - 117 U/L Final    Total Bilirubin 04/17/2024 0.5  0.0 - 1.2 mg/dL Final    Globulin 04/17/2024 3.0  gm/dL Final    A/G Ratio 04/17/2024 1.4  g/dL Final    BUN/Creatinine Ratio 04/17/2024 10.7  7.0 - 25.0 Final    Anion Gap 04/17/2024 8.0  5.0 - 15.0 mmol/L Final    eGFR 04/17/2024 47.7 (L)  >60.0 mL/min/1.73 Final    Total Cholesterol 04/17/2024 137  0 - 200 mg/dL Final    Triglycerides 04/17/2024 58  0 - 150 mg/dL Final    HDL Cholesterol 04/17/2024 65 (H)  40 - 60 mg/dL Final    LDL Cholesterol  04/17/2024 60  0 - 100 mg/dL Final    VLDL Cholesterol 04/17/2024 12  5 - 40 mg/dL Final    LDL/HDL Ratio 04/17/2024 0.93   Final    Free T4 04/17/2024 1.02  0.93 - 1.70 ng/dL Final    TSH 04/17/2024 3.480  0.270 - 4.200 uIU/mL Final    25 Hydroxy, Vitamin D 04/17/2024 32.9  30.0 - 100.0 ng/ml Final    Hemoglobin A1C 04/17/2024 5.50  4.80 - 5.60 % Final    Vitamin B-12 04/17/2024 319  211 - 946 pg/mL Final    Folate 04/17/2024 10.20  4.78 - 24.20 ng/mL Final    Magnesium 04/17/2024 1.9  1.6 - 2.4 mg/dL Final    Ferritin 04/17/2024 31.80  30.00 - 400.00 ng/mL Final    Iron 04/17/2024 71  59 - 158 mcg/dL Final    Iron Saturation (TSAT) 04/17/2024 18 (L)  20 - 50 % Final    Transferrin 04/17/2024 260  200 - 360 mg/dL Final    TIBC 04/17/2024 387  298 - 536 mcg/dL Final    WBC 04/17/2024 5.21  3.40 - 10.80 10*3/mm3 Final    RBC 04/17/2024 4.85  4.14 - 5.80 10*6/mm3 Final    Hemoglobin 04/17/2024 13.0  13.0 - 17.7 g/dL Final    Hematocrit 04/17/2024 39.1   37.5 - 51.0 % Final    MCV 04/17/2024 80.6  79.0 - 97.0 fL Final    MCH 04/17/2024 26.8  26.6 - 33.0 pg Final    MCHC 04/17/2024 33.2  31.5 - 35.7 g/dL Final    RDW 04/17/2024 13.8  12.3 - 15.4 % Final    RDW-SD 04/17/2024 40.2  37.0 - 54.0 fl Final    MPV 04/17/2024 10.6  6.0 - 12.0 fL Final    Platelets 04/17/2024 240  140 - 450 10*3/mm3 Final    Neutrophil % 04/17/2024 65.4  42.7 - 76.0 % Final    Lymphocyte % 04/17/2024 20.9  19.6 - 45.3 % Final    Monocyte % 04/17/2024 10.6  5.0 - 12.0 % Final    Eosinophil % 04/17/2024 2.5  0.3 - 6.2 % Final    Basophil % 04/17/2024 0.2  0.0 - 1.5 % Final    Immature Grans % 04/17/2024 0.4  0.0 - 0.5 % Final    Neutrophils, Absolute 04/17/2024 3.41  1.70 - 7.00 10*3/mm3 Final    Lymphocytes, Absolute 04/17/2024 1.09  0.70 - 3.10 10*3/mm3 Final    Monocytes, Absolute 04/17/2024 0.55  0.10 - 0.90 10*3/mm3 Final    Eosinophils, Absolute 04/17/2024 0.13  0.00 - 0.40 10*3/mm3 Final    Basophils, Absolute 04/17/2024 0.01  0.00 - 0.20 10*3/mm3 Final    Immature Grans, Absolute 04/17/2024 0.02  0.00 - 0.05 10*3/mm3 Final    nRBC 04/17/2024 0.0  0.0 - 0.2 /100 WBC Final   Lab on 03/29/2024   Component Date Value Ref Range Status    Glucose 03/29/2024 90  65 - 99 mg/dL Final    BUN 03/29/2024 19  8 - 23 mg/dL Final    Creatinine 03/29/2024 1.60 (H)  0.76 - 1.27 mg/dL Final    Sodium 03/29/2024 140  136 - 145 mmol/L Final    Potassium 03/29/2024 4.7  3.5 - 5.2 mmol/L Final    Chloride 03/29/2024 103  98 - 107 mmol/L Final    CO2 03/29/2024 25.8  22.0 - 29.0 mmol/L Final    Calcium 03/29/2024 9.1  8.6 - 10.5 mg/dL Final    Albumin 03/29/2024 4.2  3.5 - 5.2 g/dL Final    Phosphorus 03/29/2024 3.8  2.5 - 4.5 mg/dL Final    Anion Gap 03/29/2024 11.2  5.0 - 15.0 mmol/L Final    BUN/Creatinine Ratio 03/29/2024 11.9  7.0 - 25.0 Final    eGFR 03/29/2024 44.1 (L)  >60.0 mL/min/1.73 Final    25 Hydroxy, Vitamin D 03/29/2024 38.8  30.0 - 100.0 ng/ml Final    PTH, Intact 03/29/2024 44.3  15.0 - 65.0  pg/mL Final    WBC 03/29/2024 6.10  3.40 - 10.80 10*3/mm3 Final    RBC 03/29/2024 4.81  4.14 - 5.80 10*6/mm3 Final    Hemoglobin 03/29/2024 13.2  13.0 - 17.7 g/dL Final    Hematocrit 03/29/2024 39.2  37.5 - 51.0 % Final    MCV 03/29/2024 81.5  79.0 - 97.0 fL Final    MCH 03/29/2024 27.4  26.6 - 33.0 pg Final    MCHC 03/29/2024 33.7  31.5 - 35.7 g/dL Final    RDW 03/29/2024 13.8  12.3 - 15.4 % Final    RDW-SD 03/29/2024 41.1  37.0 - 54.0 fl Final    MPV 03/29/2024 11.0  6.0 - 12.0 fL Final    Platelets 03/29/2024 252  140 - 450 10*3/mm3 Final    Neutrophil % 03/29/2024 68.7  42.7 - 76.0 % Final    Lymphocyte % 03/29/2024 19.7  19.6 - 45.3 % Final    Monocyte % 03/29/2024 10.0  5.0 - 12.0 % Final    Eosinophil % 03/29/2024 1.1  0.3 - 6.2 % Final    Basophil % 03/29/2024 0.3  0.0 - 1.5 % Final    Immature Grans % 03/29/2024 0.2  0.0 - 0.5 % Final    Neutrophils, Absolute 03/29/2024 4.19  1.70 - 7.00 10*3/mm3 Final    Lymphocytes, Absolute 03/29/2024 1.20  0.70 - 3.10 10*3/mm3 Final    Monocytes, Absolute 03/29/2024 0.61  0.10 - 0.90 10*3/mm3 Final    Eosinophils, Absolute 03/29/2024 0.07  0.00 - 0.40 10*3/mm3 Final    Basophils, Absolute 03/29/2024 0.02  0.00 - 0.20 10*3/mm3 Final    Immature Grans, Absolute 03/29/2024 0.01  0.00 - 0.05 10*3/mm3 Final    nRBC 03/29/2024 0.0  0.0 - 0.2 /100 WBC Final    Color, UA 03/29/2024 Yellow  Yellow, Straw Final    Appearance, UA 03/29/2024 Cloudy (A)  Clear Final    pH, UA 03/29/2024 5.5  5.0 - 8.0 Final    Specific Gravity, UA 03/29/2024 1.020  1.005 - 1.030 Final    Glucose, UA 03/29/2024 Negative  Negative Final    Ketones, UA 03/29/2024 Negative  Negative Final    Bilirubin, UA 03/29/2024 Negative  Negative Final    Blood, UA 03/29/2024 Negative  Negative Final    Protein, UA 03/29/2024 Trace (A)  Negative Final    Leuk Esterase, UA 03/29/2024 Trace (A)  Negative Final    Nitrite, UA 03/29/2024 Negative  Negative Final    Urobilinogen, UA 03/29/2024 0.2 E.U./dL  0.2 - 1.0  E.U./dL Final    Creatinine, Urine 03/29/2024 170.7  mg/dL Final    Total Protein, Urine 03/29/2024 13.8  mg/dL Final    Protein/Creatinine Ratio, Urine 03/29/2024 0.08   Final    RBC, UA 03/29/2024 0-2  None Seen, 0-2 /HPF Final    WBC, UA 03/29/2024 0-2  None Seen, 0-2 /HPF Final    Bacteria, UA 03/29/2024 None Seen  None Seen /HPF Final    Squamous Epithelial Cells, UA 03/29/2024 0-2  None Seen, 0-2 /HPF Final    Hyaline Casts, UA 03/29/2024 0-2  None Seen /LPF Final    Methodology 03/29/2024 Automated Microscopy   Final   Office Visit on 03/04/2024   Component Date Value Ref Range Status    Color 03/04/2024 Yellow  Yellow, Straw, Dark Yellow, Juliet Final    Clarity, UA 03/04/2024 Clear  Clear Final    Specific Gravity  03/04/2024 1.030  1.005 - 1.030 Final    pH, Urine 03/04/2024 5.5  5.0 - 8.0 Final    Leukocytes 03/04/2024 Negative  Negative Final    Nitrite, UA 03/04/2024 Negative  Negative Final    Protein, POC 03/04/2024 Negative  Negative mg/dL Final    Glucose, UA 03/04/2024 Negative  Negative mg/dL Final    Ketones, UA 03/04/2024 Negative  Negative Final    Urobilinogen, UA 03/04/2024 0.2 E.U./dL  Normal, 0.2 E.U./dL Final    Bilirubin 03/04/2024 Small (1+) (A)  Negative Final    Blood, UA 03/04/2024 Negative  Negative Final    Lot Number 03/04/2024 [id number]   Final    Expiration Date 03/04/2024 11-   Final       EKG Results:  No orders to display       Imaging Results:  No Images in the past 120 days found..      Assessment & Plan   Diagnoses and all orders for this visit:    1. Major depressive disorder, recurrent episode, moderate (Primary)  -     FLUoxetine (PROzac) 10 MG capsule; Take 1 capsule by mouth Daily.  Dispense: 90 capsule; Refill: 1    2. Generalized anxiety disorder  -     FLUoxetine (PROzac) 10 MG capsule; Take 1 capsule by mouth Daily.  Dispense: 90 capsule; Refill: 1    3. Insomnia due to mental condition  -     FLUoxetine (PROzac) 10 MG capsule; Take 1 capsule by mouth Daily.   Dispense: 90 capsule; Refill: 1        Visit Diagnoses:    ICD-10-CM ICD-9-CM   1. Major depressive disorder, recurrent episode, moderate  F33.1 296.32   2. Generalized anxiety disorder  F41.1 300.02   3. Insomnia due to mental condition  F51.05 300.9     327.02     6/12: Slow wean off paxil (has been on for years), start prozac (safe in kidney dz). Wife is significant stressor. 4w    PLAN:  Safety: No acute safety concerns  Therapy: None  Risk Assessment: Risk of self-harm acutely is moderate.  Risk factors include anxiety disorder, mood disorder, access to firearms, and recent psychosocial stressors (pandemic). Protective factors include no family history, no present SI, no history of suicide attempts or self-harm in the past, minimal AODA, healthcare seeking, future orientation, willingness to engage in care.  Risk of self-harm chronically is also moderate, but could be further elevated in the event of treatment noncompliance and/or AODA.  Meds:  START prozac 10 mg qam. Risks, benefits, alternatives discussed with patient including GI upset, nausea vomiting diarrhea, theoretical decrease of seizure threshold predisposing the patient to a slightly higher seizure risk, headaches, sexual dysfunction, serotonin syndrome, bleeding risk, increased suicidality in patients 24 years and younger, switching to shweta/hypomania.  After discussion of these risks and benefits, the patient voiced understanding and agreed to proceed.  REDUCE paxil 10 to 5 mg qam x2w, then STOP. Risks, benefits, alternatives discussed with patient including GI upset, nausea vomiting diarrhea, theoretical decrease of seizure threshold predisposing the patient to a slightly higher seizure risk, headaches, sexual dysfunction, serotonin syndrome, bleeding risk, increased suicidality in patients 24 years and younger, switching to shweta/hypomania.  After discussion of these risks and benefits, the patient voiced understanding and agreed to  proceed.  Labs: none    Patient screened positive for depression based on a PHQ-9 score of 14 on 6/12/2024. Follow-up recommendations include: Prescribed antidepressant medication treatment and Suicide Risk Assessment performed.           TREATMENT PLAN/GOALS: Continue supportive psychotherapy efforts and medications as indicated. Treatment and medication options discussed during today's visit. Patient acknowledged and verbally consented to continue with current treatment plan and was educated on the importance of compliance with treatment and follow-up appointments.    MEDICATION ISSUES:  REENA reviewed as expected.  Discussed medication options and treatment plan of prescribed medication as well as the risks, benefits, and side effects including potential falls, possible impaired driving and metabolic adversities among others. Patient is agreeable to call the office with any worsening of symptoms or onset of side effects. Patient is agreeable to call 911 or go to the nearest ER should he/she begin having SI/HI. No medication side effects or related complaints today.     MEDS ORDERED DURING VISIT:  New Medications Ordered This Visit   Medications    FLUoxetine (PROzac) 10 MG capsule     Sig: Take 1 capsule by mouth Daily.     Dispense:  90 capsule     Refill:  1     Please dc paxil       Return in about 4 weeks (around 7/10/2024).         This document has been electronically signed by Teddy Montes MD  June 12, 2024 09:16 EDT    Dictated Utilizing Dragon Dictation: Part of this note may be an electronic transcription/translation of spoken language to printed text using the Dragon Dictation System.

## 2024-06-12 ENCOUNTER — OFFICE VISIT (OUTPATIENT)
Dept: PSYCHIATRY | Facility: CLINIC | Age: 79
End: 2024-06-12
Payer: MEDICARE

## 2024-06-12 VITALS
SYSTOLIC BLOOD PRESSURE: 112 MMHG | HEIGHT: 70 IN | DIASTOLIC BLOOD PRESSURE: 61 MMHG | WEIGHT: 184.2 LBS | BODY MASS INDEX: 26.37 KG/M2 | HEART RATE: 61 BPM

## 2024-06-12 DIAGNOSIS — F51.05 INSOMNIA DUE TO MENTAL CONDITION: ICD-10-CM

## 2024-06-12 DIAGNOSIS — F33.1 MAJOR DEPRESSIVE DISORDER, RECURRENT EPISODE, MODERATE: Primary | ICD-10-CM

## 2024-06-12 DIAGNOSIS — F41.1 GENERALIZED ANXIETY DISORDER: ICD-10-CM

## 2024-06-12 RX ORDER — FLUOXETINE 10 MG/1
10 CAPSULE ORAL DAILY
Qty: 90 CAPSULE | Refills: 1 | Status: SHIPPED | OUTPATIENT
Start: 2024-06-12

## 2024-06-12 NOTE — TREATMENT PLAN
Multi-Disciplinary Problems (from Behavioral Health Treatment Plan)      Active Problems       Problem: Anxiety  Start Date: 06/12/24      Problem Details: The patient self-scales this problem as a 3 with 10 being the worst.          Goal Priority Start Date Expected End Date End Date    Patient will develop and implement behavioral and cognitive strategies to reduce anxiety and irrational fears. -- 06/12/24 -- --    Goal Details: Progress toward goal:  Not appropriate to rate progress toward goal since this is the initial treatment plan.          Goal Intervention Frequency Start Date End Date    Help patient explore past emotional issues in relation to present anxiety. Q Month 06/12/24 --    Intervention Details: Duration of treatment until until remission of symptoms.          Goal Intervention Frequency Start Date End Date    Help patient develop an awareness of their cognitive and physical responses to anxiety. Q Month 06/12/24 --    Intervention Details: Duration of treatment until until remission of symptoms.                  Problem: Depression  Start Date: 06/12/24      Problem Details: The patient self-scales this problem as a 5 with 10 being the worst.          Goal Priority Start Date Expected End Date End Date    Patient will demonstrate the ability to initiate new constructive life skills outside of sessions on a consistent basis. -- 06/12/24 -- --    Goal Details: Progress toward goal:  Not appropriate to rate progress toward goal since this is the initial treatment plan.          Goal Intervention Frequency Start Date End Date    Assist patient in setting attainable activities of daily living goals. PRN 06/12/24 --      Goal Intervention Frequency Start Date End Date    Provide education about depression Q Month 06/12/24 --    Intervention Details: Duration of treatment until until remission of symptoms.          Goal Intervention Frequency Start Date End Date    Assist patient in developing healthy  coping strategies. Q Month 06/12/24 --    Intervention Details: Duration of treatment until until remission of symptoms.                          Reviewed By       Teddy Montes MD 06/12/24 8528                     I have discussed and reviewed this treatment plan with the patient.

## 2024-06-14 ENCOUNTER — PATIENT ROUNDING (BHMG ONLY) (OUTPATIENT)
Dept: PSYCHIATRY | Facility: CLINIC | Age: 79
End: 2024-06-14
Payer: MEDICARE

## 2024-06-14 NOTE — PROGRESS NOTES
June 14, 2024    Hello, may I speak with Reji Mcmillan?    My name is JAYY ESTRADA      I am  with [facility]  [facility]  [address]  [place] [address] [phone].    Before we get started may I verify your date of birth? 1945    I am calling to officially welcome you to our practice and ask about your recent visit. Is this a good time to talk? NO-VOICEMAIL LEFT    Tell me about your visit with us. What things went well?  NO-VOICEMAIL LEFT     We're always looking for ways to make our patients' experiences even better. Do you have recommendations on ways we may improve?  NO-VOICEMAIL LEFT    Overall were you satisfied with your first visit to our practice? NO-VOICEMAIL LEFT       I appreciate you taking the time to speak with me today. Is there anything else I can do for you? NO-VOICEMAIL LEFT      Thank you, and have a great day.

## 2024-06-27 DIAGNOSIS — R42 DIZZINESS AND GIDDINESS: ICD-10-CM

## 2024-06-27 DIAGNOSIS — H93.A9 PULSATILE TINNITUS: ICD-10-CM

## 2024-06-27 DIAGNOSIS — H93.A1 PULSATILE TINNITUS OF RIGHT EAR: ICD-10-CM

## 2024-07-14 NOTE — PATIENT INSTRUCTIONS
1.  Please return to clinic at your next scheduled visit.  Contact the clinic ([phone]) at least 24 hours prior in the event you need to cancel.  2.  Do no harm to yourself or others.    3.  Avoid alcohol and drugs.    4.  Take all medications as prescribed.  Please contact the clinic with any concerns. If you are in need of medication refills, please call the clinic at [phone].    5. Should you want to get in touch with your provider, Dr. Teddy Montes, please utilize [employer] or contact the office ([phone]), and staff will be able to page Dr. Montes directly.  6.  In the event you have personal crisis, contact the following crisis numbers: Suicide Prevention Hotline 1-228.927.5736; [facility] Helpline 2-313-290-EDWARD; [facility] Emergency Room [phone]; text HELLO to 964747; or 150.

## 2024-07-14 NOTE — PROGRESS NOTES
"Subjective   Reji Mcmillan is a 78 y.o. male who presents today for initial evaluation     Referring Provider:  No referring provider defined for this encounter.    Chief Complaint:  depression    History of Present Illness:     2024: INITIAL VISIT Chart review:     Dewey: Ambien CR 12.5 mg nightly chronically  Care Everywhere: Several non behavioral health notes pertaining to chronic kidney disease    Psychotropic medication chart review:  Present:  Ambien CR 12.5 mg at night    Previously:  Paxil 10 mg a day    EKG: 2023: 55, sinus, QTc 419.  Procedures: none  Head imaging: none  Labs: 2024: Creatinine 1.5, glucose 103, calcium 7.6, otherwise reassuring CMP.  Reassuring A1c, thyroid studies, lipids, vitamin D, CBC.  Total saturation  Iron profile is slightly low at 18.  UDS entirely negative in 2023.  Initial Chart Review Notes: Seen by primary care in April.  PHQ-9 is a 4.  Seen as a follow-up for anxiety, insomnia.  Sleeping well on Ambien.  Having some fatigue.  Referred to us for anxiety and depression.      Patient Psychotherapy Notes:  Patient goals:  Misc:  Stage IIIb chronic kidney disease  Hx open heart surgery, 2x bypass. .      Chart Review By Dates:  07/15/2024: no visits.    Plannin/12: Slow wean off paxil (has been on for years), start prozac (safe in kidney dz). Wife is significant stressor. 4w    VISITS/APPOINTMENTS (BELOW):    \"Reji\"     07/15/2024: In person interview:  \"Well, I didn't know what to expect.\"  One of my friends noticed I'm a little more talkative.  Not dreading to do stuff.  I'd like something else for sleep. I don't want to be on narcotics.  MDD: Improving mood  KIMBERLY: improving  Panic attacks: n  Energy: improving  Concentration: improving  Insomnia: stable on ambien, but vivid dreams  Eating/Weight: 184  Refills: y  Substances: denies  Therapy: n  Medication compliant: y  SE: n  No SI HI AVH.      2024: In " "person.  Interview:  His/Her Story: \"Ambien helps, but it doesn't hold me down.\"  P14, G7  Racing thoughts at night  Friends have said I've changed, I'm more \"low\" and not as enthusiastic as I used to be  Has been on paxil for more than 3 years   54 years, wife has dementia, additional stressor  She's losing function  Hx open heart surgery, 2x bypass.  Depression/Mood:  Depressed mood, anhedonia,   poor energy, poor concentration,   insomnia.  Seasonal pattern: def  Severity: Moderate  Duration: Many years  Anxiety:  Uncontrolled worrying, muscle tension, fatigue, poor concentration, feeling on edge  irritability, insomnia.  Severity: Moderate  Duration: Many years  Panic attacks: n  Psych ROS: Positive for depression, anxiety.  Negative for psychosis and shweta.  ADHD: def  PTSD: def  No SI HI AVH.  Medication compliant: y    Access to Firearms:  yes, locked away    PHQ-9 Depression Screening  PHQ-9 Total Score:      Little interest or pleasure in doing things?     Feeling down, depressed, or hopeless?     Trouble falling or staying asleep, or sleeping too much?     Feeling tired or having little energy?     Poor appetite or overeating?     Feeling bad about yourself - or that you are a failure or have let yourself or your family down?     Trouble concentrating on things, such as reading the newspaper or watching television?     Moving or speaking so slowly that other people could have noticed? Or the opposite - being so fidgety or restless that you have been moving around a lot more than usual?     Thoughts that you would be better off dead, or of hurting yourself in some way?     PHQ-9 Total Score       KIMBERLY-7       Past Surgical History:  Past Surgical History:   Procedure Laterality Date    CARDIAC SURGERY      CHOLECYSTECTOMY      COLONOSCOPY      COLONOSCOPY N/A 3/20/2023    Procedure: COLONOSCOPY;  Surgeon: Jacques Dc MD;  Location: [facility] ENDOSCOPY;  Service: General;  Laterality: N/A;  " "diverticulosis, hemorrhoids    CORONARY ARTERY BYPASS GRAFT      cabbage x2    DENTAL PROCEDURE      HERNIA REPAIR      TOE SURGERY Left        Problem List:  Patient Active Problem List   Diagnosis    Essential hypertension    Mixed hyperlipidemia    Hypothyroidism    Vitamin D deficiency    Anxiety and depression    Insomnia    Anemia    Chronic low back pain    Nodular hyperplasia of prostate gland    Elevated prostate specific antigen (PSA)    Disorder of arteries and arterioles, unspecified    Stage 3 chronic kidney disease    CAD s/p CABG    Peyronie disease    Vasculogenic erectile dysfunction    History of elevated PSA    Screening for malignant neoplasm of colon    History of colonic polyps    Other male erectile dysfunction       Allergy:   Allergies   Allergen Reactions    Sulfa Antibiotics Unknown - Low Severity     Flu like symptoms caused from taking medication        Discontinued Medications:  There are no discontinued medications.      Current Medications:   Current Outpatient Medications   Medication Sig Dispense Refill    amLODIPine (NORVASC) 5 MG tablet TAKE 1 TABLET DAILY 90 tablet 3    aspirin 81 MG EC tablet Take 1 tablet by mouth Daily.      atorvastatin (LIPITOR) 80 MG tablet TAKE 1 TABLET DAILY 90 tablet 3    B-D TB SYRINGE 1CC/27GX1/2\" 27G X 1/2\" 1 ML misc USE FOR XIAFLEX INJECTION 4 each 3    cetirizine (zyrTEC) 10 MG tablet Take 1 tablet by mouth Daily.      Dupilumab (Dupixent) 300 MG/2ML solution pen-injector Inject 1 mL under the skin into the appropriate area as directed Every 14 (Fourteen) Days.      FLUoxetine (PROzac) 10 MG capsule Take 1 capsule by mouth Daily. 90 capsule 1    ketoconazole (NIZORAL) 2 % cream Apply 1 application  topically to the appropriate area as directed Daily. 30 g 1    lansoprazole (PREVACID) 30 MG capsule TAKE 1 CAPSULE DAILY 90 capsule 3    levothyroxine (SYNTHROID, LEVOTHROID) 25 MCG tablet TAKE 1 TABLET DAILY 90 tablet 3    losartan (COZAAR) 25 MG tablet " TAKE 1 TABLET DAILY 90 tablet 3    ranolazine (RANEXA) 500 MG 12 hr tablet TAKE 1 TABLET TWICE A  tablet 3    tadalafil (CIALIS) 5 MG tablet Take 1 tablet by mouth Daily As Needed for Erectile Dysfunction for up to 180 days. 90 tablet 1    tamsulosin (FLOMAX) 0.4 MG capsule 24 hr capsule TAKE 1 CAPSULE DAILY 30 MINUTES AFTER DINNER MEAL 90 capsule 3    zolpidem CR (AMBIEN CR) 12.5 MG CR tablet Take 1 tablet by mouth At Night As Needed for Sleep. 90 tablet 1    mirtazapine (REMERON) 7.5 MG tablet Take 1 tablet by mouth Every Night. 90 tablet 1    vitamin D (ERGOCALCIFEROL) 1.25 MG (76332 UT) capsule capsule Take 1 capsule by mouth 1 (One) Time Per Week. Take with a meal. (Patient not taking: Reported on 7/15/2024) 13 capsule 1    Xiaflex 0.9 MG injection  (Patient not taking: Reported on 7/15/2024)       Current Facility-Administered Medications   Medication Dose Route Frequency Provider Last Rate Last Admin    collagenase clostrid histolyt (XIAFLEX) injection 0.58 mg  0.58 mg Injection Once Mellisa Montes MD        collagenase clostrid histolyt (XIAFLEX) injection 0.58 mg  0.58 mg Injection Once Mellisa Montes MD        collagenase clostrid histolyt (XIAFLEX) injection 0.58 mg  0.58 mg Injection Once Mellisa Montes MD        collagenase clostrid histolyt (XIAFLEX) injection 0.58 mg  0.58 mg Injection Once Mellisa Montes MD        collagenase clostrid histolyt (XIAFLEX) injection 0.58 mg  0.58 mg Injection Once Mellisa Montes MD           Past Medical History:  Past Medical History:   Diagnosis Date    Allergic rhinitis due to allergen     Benign prostatic hyperplasia     CAD s/p CABG 03/30/2022    Chest pain     Chronic allergic rhinitis     CKD (chronic kidney disease) stage 3, GFR 30-59 ml/min     Eczema     Erectile dysfunction 10/19/2020    GERD (gastroesophageal reflux disease)     Heart disease     High blood pressure     High cholesterol     Hypercholesteremia      Hypertension     Controlled    Hypothyroidism     Orthostatic hypotension     Polio     Prostate disorder        Past Psychiatric History:  Began Treatment: PCP for years  Diagnoses: MDD  Psychiatrist:Denies  Therapist:Denies  Admission History:Denies  Medication Trials:    Paxil only, used to work    Self Harm: Denies  Suicide Attempts:Denies      Substance Abuse History:   Types: 1 airplane bottle 4x/wk  Withdrawal Symptoms:Denies  Longest Period Sober:Not Applicable   AA: Not applicable     Social History:  Martial Status:  Employed:No. 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    Drug use: Never    Sexual activity: Defer       Family History:   Suicide Attempts: Denies  Suicide Completions:Denies      Family History   Problem Relation Age of Onset    Dementia Mother     Anxiety disorder Mother     Hyperlipidemia Mother     Heart disease Mother     Hypertension Mother     Hyperlipidemia Father     Hypertension Father     Heart attack Father     Depression Sister     Anxiety disorder Sister     No Known Problems Brother     No Known Problems Maternal Aunt     No Known Problems Paternal Aunt     No Known Problems Maternal Uncle     No Known Problems Paternal Uncle     No Known Problems Maternal Grandfather     No Known Problems Maternal Grandmother     No Known Problems Paternal Grandfather     No Known Problems Paternal Grandmother     No Known Problems Cousin     No Known Problems Other     Malig Hyperthermia Neg Hx     ADD / ADHD Neg Hx     Alcohol abuse Neg Hx     Bipolar disorder Neg Hx     Drug abuse Neg Hx     OCD Neg Hx     Paranoid behavior Neg Hx   "   Schizophrenia Neg Hx     Seizures Neg Hx     Self-Injurious Behavior  Neg Hx     Suicide Attempts Neg Hx        Developmental History:       Childhood: Denies Abuse  High School:Completed  College: 4 years, education degree    Mental Status Exam  Appearance  : groomed, good eye contact, normal street clothes  Behavior  : pleasant and cooperative  Motor  : No abnormal  Speech  :normal rhythm, rate, volume, tone, not hyperverbal, not pressured, normal prosidy  Mood  : \"I guess I've noticed... less dread\"  Affect  : euthymic, mood congruent, good variability  Thought Content  : negative suicidal ideations, negative homicidal ideations, negative obsessions  Perceptions  : negative auditory hallucinations, negative visual hallucinations  Thought Process  : linear  Insight/Judgement  : Fair/fair  Cognition  : grossly intact  Attention   : intact      Review of Systems:  Review of Systems   Constitutional:  Negative for diaphoresis and fatigue.   HENT:  Negative for drooling.    Eyes:  Negative for visual disturbance.   Respiratory:  Negative for cough and shortness of breath.    Cardiovascular:  Positive for chest pain. Negative for palpitations and leg swelling.   Gastrointestinal:  Negative for nausea and vomiting.   Endocrine: Negative for cold intolerance and heat intolerance.   Genitourinary:  Negative for difficulty urinating.   Musculoskeletal:  Negative for joint swelling.   Allergic/Immunologic: Negative for immunocompromised state.   Neurological:  Positive for numbness. Negative for dizziness, seizures and speech difficulty.       Physical Exam:  Physical Exam    Vital Signs:   /58   Pulse 58   Ht 177.8 cm (70\")   Wt 83.8 kg (184 lb 12.8 oz)   BMI 26.52 kg/m²      Lab Results:   Hospital Outpatient Visit on 06/07/2024   Component Date Value Ref Range Status    Right arm BP 06/07/2024 134/71  mmHg Final    Left arm BP 06/07/2024 138/71  mmHg Final    Prox CCA PSV 06/07/2024 89.4  cm/sec Final    " Prox CCA EDV 06/07/2024 11.1  cm/sec Final    Dist CCA PSV 06/07/2024 101.8  cm/sec Final    Dist CCA EDV 06/07/2024 17.4  cm/sec Final    Prox ICA PSV 06/07/2024 80.1  cm/sec Final    Prox ICA EDV 06/07/2024 16.1  cm/sec Final    Mid ICA PSV 06/07/2024 90.0  cm/sec Final    Mid ICA EDV 06/07/2024 19.3  cm/sec Final    Dist ICA PSV 06/07/2024 78.9  cm/sec Final    Dist ICA EDV 06/07/2024 18.6  cm/sec Final    Prox ECA PSV 06/07/2024 113.0  cm/sec Final    Prox ECA EDV 06/07/2024 9.3  cm/sec Final    Vertebral A PSV 06/07/2024 35.0  cm/sec Final    Vertebral A EDV 06/07/2024 9.5  cm/sec Final    Prox CCA PSV 06/07/2024 91.8  cm/sec Final    Prox CCA EDV 06/07/2024 11.6  cm/sec Final    Dist CCA PSV 06/07/2024 87.6  cm/sec Final    Dist CCA EDV 06/07/2024 11.0  cm/sec Final    Prox ICA PSV 06/07/2024 77.2  cm/sec Final    Prox ICA EDV 06/07/2024 13.0  cm/sec Final    Mid ICA PSV 06/07/2024 77.9  cm/sec Final    Mid ICA EDV 06/07/2024 18.8  cm/sec Final    Dist ICA PSV 06/07/2024 88.3  cm/sec Final    Dist ICA EDV 06/07/2024 15.6  cm/sec Final    Prox ECA PSV 06/07/2024 92.2  cm/sec Final    Prox ECA EDV 06/07/2024 9.1  cm/sec Final    Vertebral A PSV 06/07/2024 38.3  cm/sec Final    Vertebral A EDV 06/07/2024 10.4  cm/sec Final    XLRA MERRY RIGHT CAROTID BULB PSV 06/07/2024 97.5  cm/sec Final    XLRA MERRY RIGHT CAROTID BULB EDV 06/07/2024 15.5  cm/sec Final    XLRA MERRY LEFT CAROTID BULB PSV 06/07/2024 63.0  cm/sec Final    XLRA MERRY LEFT CAROTID BULB EDV 06/07/2024 9.7  cm/sec Final    ICA/CCA ratio 06/07/2024 0.80   Final    ICA/CCA ratio 06/07/2024 0.90   Final   Lab on 04/17/2024   Component Date Value Ref Range Status    Glucose 04/17/2024 103 (H)  65 - 99 mg/dL Final    BUN 04/17/2024 16  8 - 23 mg/dL Final    Creatinine 04/17/2024 1.50 (H)  0.76 - 1.27 mg/dL Final    Sodium 04/17/2024 140  136 - 145 mmol/L Final    Potassium 04/17/2024 4.3  3.5 - 5.2 mmol/L Final    Chloride 04/17/2024 105  98 - 107 mmol/L  Final    CO2 04/17/2024 27.0  22.0 - 29.0 mmol/L Final    Calcium 04/17/2024 7.6 (L)  8.6 - 10.5 mg/dL Final    Total Protein 04/17/2024 7.1  6.0 - 8.5 g/dL Final    Albumin 04/17/2024 4.1  3.5 - 5.2 g/dL Final    ALT (SGPT) 04/17/2024 13  1 - 41 U/L Final    AST (SGOT) 04/17/2024 11  1 - 40 U/L Final    Alkaline Phosphatase 04/17/2024 52  39 - 117 U/L Final    Total Bilirubin 04/17/2024 0.5  0.0 - 1.2 mg/dL Final    Globulin 04/17/2024 3.0  gm/dL Final    A/G Ratio 04/17/2024 1.4  g/dL Final    BUN/Creatinine Ratio 04/17/2024 10.7  7.0 - 25.0 Final    Anion Gap 04/17/2024 8.0  5.0 - 15.0 mmol/L Final    eGFR 04/17/2024 47.7 (L)  >60.0 mL/min/1.73 Final    Total Cholesterol 04/17/2024 137  0 - 200 mg/dL Final    Triglycerides 04/17/2024 58  0 - 150 mg/dL Final    HDL Cholesterol 04/17/2024 65 (H)  40 - 60 mg/dL Final    LDL Cholesterol  04/17/2024 60  0 - 100 mg/dL Final    VLDL Cholesterol 04/17/2024 12  5 - 40 mg/dL Final    LDL/HDL Ratio 04/17/2024 0.93   Final    Free T4 04/17/2024 1.02  0.93 - 1.70 ng/dL Final    TSH 04/17/2024 3.480  0.270 - 4.200 uIU/mL Final    25 Hydroxy, Vitamin D 04/17/2024 32.9  30.0 - 100.0 ng/ml Final    Hemoglobin A1C 04/17/2024 5.50  4.80 - 5.60 % Final    Vitamin B-12 04/17/2024 319  211 - 946 pg/mL Final    Folate 04/17/2024 10.20  4.78 - 24.20 ng/mL Final    Magnesium 04/17/2024 1.9  1.6 - 2.4 mg/dL Final    Ferritin 04/17/2024 31.80  30.00 - 400.00 ng/mL Final    Iron 04/17/2024 71  59 - 158 mcg/dL Final    Iron Saturation (TSAT) 04/17/2024 18 (L)  20 - 50 % Final    Transferrin 04/17/2024 260  200 - 360 mg/dL Final    TIBC 04/17/2024 387  298 - 536 mcg/dL Final    WBC 04/17/2024 5.21  3.40 - 10.80 10*3/mm3 Final    RBC 04/17/2024 4.85  4.14 - 5.80 10*6/mm3 Final    Hemoglobin 04/17/2024 13.0  13.0 - 17.7 g/dL Final    Hematocrit 04/17/2024 39.1  37.5 - 51.0 % Final    MCV 04/17/2024 80.6  79.0 - 97.0 fL Final    MCH 04/17/2024 26.8  26.6 - 33.0 pg Final    MCHC 04/17/2024 33.2   31.5 - 35.7 g/dL Final    RDW 04/17/2024 13.8  12.3 - 15.4 % Final    RDW-SD 04/17/2024 40.2  37.0 - 54.0 fl Final    MPV 04/17/2024 10.6  6.0 - 12.0 fL Final    Platelets 04/17/2024 240  140 - 450 10*3/mm3 Final    Neutrophil % 04/17/2024 65.4  42.7 - 76.0 % Final    Lymphocyte % 04/17/2024 20.9  19.6 - 45.3 % Final    Monocyte % 04/17/2024 10.6  5.0 - 12.0 % Final    Eosinophil % 04/17/2024 2.5  0.3 - 6.2 % Final    Basophil % 04/17/2024 0.2  0.0 - 1.5 % Final    Immature Grans % 04/17/2024 0.4  0.0 - 0.5 % Final    Neutrophils, Absolute 04/17/2024 3.41  1.70 - 7.00 10*3/mm3 Final    Lymphocytes, Absolute 04/17/2024 1.09  0.70 - 3.10 10*3/mm3 Final    Monocytes, Absolute 04/17/2024 0.55  0.10 - 0.90 10*3/mm3 Final    Eosinophils, Absolute 04/17/2024 0.13  0.00 - 0.40 10*3/mm3 Final    Basophils, Absolute 04/17/2024 0.01  0.00 - 0.20 10*3/mm3 Final    Immature Grans, Absolute 04/17/2024 0.02  0.00 - 0.05 10*3/mm3 Final    nRBC 04/17/2024 0.0  0.0 - 0.2 /100 WBC Final   Lab on 03/29/2024   Component Date Value Ref Range Status    Glucose 03/29/2024 90  65 - 99 mg/dL Final    BUN 03/29/2024 19  8 - 23 mg/dL Final    Creatinine 03/29/2024 1.60 (H)  0.76 - 1.27 mg/dL Final    Sodium 03/29/2024 140  136 - 145 mmol/L Final    Potassium 03/29/2024 4.7  3.5 - 5.2 mmol/L Final    Chloride 03/29/2024 103  98 - 107 mmol/L Final    CO2 03/29/2024 25.8  22.0 - 29.0 mmol/L Final    Calcium 03/29/2024 9.1  8.6 - 10.5 mg/dL Final    Albumin 03/29/2024 4.2  3.5 - 5.2 g/dL Final    Phosphorus 03/29/2024 3.8  2.5 - 4.5 mg/dL Final    Anion Gap 03/29/2024 11.2  5.0 - 15.0 mmol/L Final    BUN/Creatinine Ratio 03/29/2024 11.9  7.0 - 25.0 Final    eGFR 03/29/2024 44.1 (L)  >60.0 mL/min/1.73 Final    25 Hydroxy, Vitamin D 03/29/2024 38.8  30.0 - 100.0 ng/ml Final    PTH, Intact 03/29/2024 44.3  15.0 - 65.0 pg/mL Final    WBC 03/29/2024 6.10  3.40 - 10.80 10*3/mm3 Final    RBC 03/29/2024 4.81  4.14 - 5.80 10*6/mm3 Final    Hemoglobin  03/29/2024 13.2  13.0 - 17.7 g/dL Final    Hematocrit 03/29/2024 39.2  37.5 - 51.0 % Final    MCV 03/29/2024 81.5  79.0 - 97.0 fL Final    MCH 03/29/2024 27.4  26.6 - 33.0 pg Final    MCHC 03/29/2024 33.7  31.5 - 35.7 g/dL Final    RDW 03/29/2024 13.8  12.3 - 15.4 % Final    RDW-SD 03/29/2024 41.1  37.0 - 54.0 fl Final    MPV 03/29/2024 11.0  6.0 - 12.0 fL Final    Platelets 03/29/2024 252  140 - 450 10*3/mm3 Final    Neutrophil % 03/29/2024 68.7  42.7 - 76.0 % Final    Lymphocyte % 03/29/2024 19.7  19.6 - 45.3 % Final    Monocyte % 03/29/2024 10.0  5.0 - 12.0 % Final    Eosinophil % 03/29/2024 1.1  0.3 - 6.2 % Final    Basophil % 03/29/2024 0.3  0.0 - 1.5 % Final    Immature Grans % 03/29/2024 0.2  0.0 - 0.5 % Final    Neutrophils, Absolute 03/29/2024 4.19  1.70 - 7.00 10*3/mm3 Final    Lymphocytes, Absolute 03/29/2024 1.20  0.70 - 3.10 10*3/mm3 Final    Monocytes, Absolute 03/29/2024 0.61  0.10 - 0.90 10*3/mm3 Final    Eosinophils, Absolute 03/29/2024 0.07  0.00 - 0.40 10*3/mm3 Final    Basophils, Absolute 03/29/2024 0.02  0.00 - 0.20 10*3/mm3 Final    Immature Grans, Absolute 03/29/2024 0.01  0.00 - 0.05 10*3/mm3 Final    nRBC 03/29/2024 0.0  0.0 - 0.2 /100 WBC Final    Color, UA 03/29/2024 Yellow  Yellow, Straw Final    Appearance, UA 03/29/2024 Cloudy (A)  Clear Final    pH, UA 03/29/2024 5.5  5.0 - 8.0 Final    Specific Gravity, UA 03/29/2024 1.020  1.005 - 1.030 Final    Glucose, UA 03/29/2024 Negative  Negative Final    Ketones, UA 03/29/2024 Negative  Negative Final    Bilirubin, UA 03/29/2024 Negative  Negative Final    Blood, UA 03/29/2024 Negative  Negative Final    Protein, UA 03/29/2024 Trace (A)  Negative Final    Leuk Esterase, UA 03/29/2024 Trace (A)  Negative Final    Nitrite, UA 03/29/2024 Negative  Negative Final    Urobilinogen, UA 03/29/2024 0.2 E.U./dL  0.2 - 1.0 E.U./dL Final    Creatinine, Urine 03/29/2024 170.7  mg/dL Final    Total Protein, Urine 03/29/2024 13.8  mg/dL Final     Protein/Creatinine Ratio, Urine 03/29/2024 0.08   Final    RBC, UA 03/29/2024 0-2  None Seen, 0-2 /HPF Final    WBC, UA 03/29/2024 0-2  None Seen, 0-2 /HPF Final    Bacteria, UA 03/29/2024 None Seen  None Seen /HPF Final    Squamous Epithelial Cells, UA 03/29/2024 0-2  None Seen, 0-2 /HPF Final    Hyaline Casts, UA 03/29/2024 0-2  None Seen /LPF Final    Methodology 03/29/2024 Automated Microscopy   Final   Office Visit on 03/04/2024   Component Date Value Ref Range Status    Color 03/04/2024 Yellow  Yellow, Straw, Dark Yellow, Juliet Final    Clarity, UA 03/04/2024 Clear  Clear Final    Specific Gravity  03/04/2024 1.030  1.005 - 1.030 Final    pH, Urine 03/04/2024 5.5  5.0 - 8.0 Final    Leukocytes 03/04/2024 Negative  Negative Final    Nitrite, UA 03/04/2024 Negative  Negative Final    Protein, POC 03/04/2024 Negative  Negative mg/dL Final    Glucose, UA 03/04/2024 Negative  Negative mg/dL Final    Ketones, UA 03/04/2024 Negative  Negative Final    Urobilinogen, UA 03/04/2024 0.2 E.U./dL  Normal, 0.2 E.U./dL Final    Bilirubin 03/04/2024 Small (1+) (A)  Negative Final    Blood, UA 03/04/2024 Negative  Negative Final    Lot Number 03/04/2024 [id number]   Final    Expiration Date 03/04/2024 11-   Final       EKG Results:  No orders to display       Imaging Results:  No Images in the past 120 days found..      Assessment & Plan   Diagnoses and all orders for this visit:    1. Major depressive disorder, recurrent episode, moderate (Primary)  -     mirtazapine (REMERON) 7.5 MG tablet; Take 1 tablet by mouth Every Night.  Dispense: 90 tablet; Refill: 1    2. Generalized anxiety disorder  -     mirtazapine (REMERON) 7.5 MG tablet; Take 1 tablet by mouth Every Night.  Dispense: 90 tablet; Refill: 1    3. Insomnia due to mental condition  -     mirtazapine (REMERON) 7.5 MG tablet; Take 1 tablet by mouth Every Night.  Dispense: 90 tablet; Refill: 1        Visit Diagnoses:    ICD-10-CM ICD-9-CM   1. Major depressive  disorder, recurrent episode, moderate  F33.1 296.32   2. Generalized anxiety disorder  F41.1 300.02   3. Insomnia due to mental condition  F51.05 300.9     327.02     07/15/2024: Improving MDD, KIMBERLY. Wants to trial another medication besides ambien (vivid dreams, hangover after). Start mirtazapine.    Allowed patient to freely discuss and process issues, such as:  Anxiety and depression regarding relationships.  ... using Rogerian psychotherapeutic techniques including unconditional positive regard, reflective listening, and demonstrating clear empathy, with the goal of ameliorating symptoms and maintaining, restoring, or improving self-esteem, adaptive skills, and ego or psychological functions (Shea et al. 1991).  Time (minutes) spent providing supportive psychotherapy: 16  (This time is exclusive to the therapy session and separate from the time spent on activities used to meet the criteria for the E/M service (history, exam, medical decision-making).)  Start: 10:04  Stop: 10:20  Functional status: mild impairment  Treatment plan: Medication management and supportive psychotherapy  Prognosis: good  Progress: improving  4w    6/12: Slow wean off paxil (has been on for years), start prozac (safe in kidney dz). Wife is significant stressor. 4w    PLAN:  Safety: No acute safety concerns  Therapy: None  Risk Assessment: Risk of self-harm acutely is moderate.  Risk factors include anxiety disorder, mood disorder, access to firearms, and recent psychosocial stressors (pandemic). Protective factors include no family history, no present SI, no history of suicide attempts or self-harm in the past, minimal AODA, healthcare seeking, future orientation, willingness to engage in care.  Risk of self-harm chronically is also moderate, but could be further elevated in the event of treatment noncompliance and/or AODA.  Meds:  START mirtazapine 7.5 mg qhs (max dose 15 mg). Risks, benefits, alternatives discussed with patient  including GI upset, sedation, dizziness with falls risk, increased appetite.  Do not use before operating vehicle, vessel, or machine. After discussion of these risks and benefits, the patient voiced understanding and agreed to proceed.  CONTINUE prozac 10 mg qam. Risks, benefits, alternatives discussed with patient including GI upset, nausea vomiting diarrhea, theoretical decrease of seizure threshold predisposing the patient to a slightly higher seizure risk, headaches, sexual dysfunction, serotonin syndrome, bleeding risk, increased suicidality in patients 24 years and younger, switching to shweta/hypomania.  After discussion of these risks and benefits, the patient voiced understanding and agreed to proceed.  S/P:  paxil 10 mg qam. Ineffective 6/24  Labs: none    Patient screened positive for depression based on a PHQ-9 score of 14 on 6/12/2024. Follow-up recommendations include: Prescribed antidepressant medication treatment and Suicide Risk Assessment performed.           TREATMENT PLAN/GOALS: Continue supportive psychotherapy efforts and medications as indicated. Treatment and medication options discussed during today's visit. Patient acknowledged and verbally consented to continue with current treatment plan and was educated on the importance of compliance with treatment and follow-up appointments.    MEDICATION ISSUES:  REENA reviewed as expected.  Discussed medication options and treatment plan of prescribed medication as well as the risks, benefits, and side effects including potential falls, possible impaired driving and metabolic adversities among others. Patient is agreeable to call the office with any worsening of symptoms or onset of side effects. Patient is agreeable to call 911 or go to the nearest ER should he/she begin having SI/HI. No medication side effects or related complaints today.     MEDS ORDERED DURING VISIT:  New Medications Ordered This Visit   Medications    mirtazapine (REMERON) 7.5  MG tablet     Sig: Take 1 tablet by mouth Every Night.     Dispense:  90 tablet     Refill:  1       Return in about 4 weeks (around 8/12/2024).         This document has been electronically signed by Teddy Montes MD  July 15, 2024 10:18 EDT    Dictated Utilizing Dragon Dictation: Part of this note may be an electronic transcription/translation of spoken language to printed text using the Dragon Dictation System.

## 2024-07-15 ENCOUNTER — OFFICE VISIT (OUTPATIENT)
Dept: PSYCHIATRY | Facility: CLINIC | Age: 79
End: 2024-07-15
Payer: MEDICARE

## 2024-07-15 VITALS
DIASTOLIC BLOOD PRESSURE: 58 MMHG | HEIGHT: 70 IN | HEART RATE: 58 BPM | BODY MASS INDEX: 26.45 KG/M2 | SYSTOLIC BLOOD PRESSURE: 130 MMHG | WEIGHT: 184.8 LBS

## 2024-07-15 DIAGNOSIS — F41.1 GENERALIZED ANXIETY DISORDER: ICD-10-CM

## 2024-07-15 DIAGNOSIS — F51.05 INSOMNIA DUE TO MENTAL CONDITION: ICD-10-CM

## 2024-07-15 DIAGNOSIS — F33.1 MAJOR DEPRESSIVE DISORDER, RECURRENT EPISODE, MODERATE: Primary | ICD-10-CM

## 2024-07-15 PROCEDURE — 3078F DIAST BP <80 MM HG: CPT | Performed by: STUDENT IN AN ORGANIZED HEALTH CARE EDUCATION/TRAINING PROGRAM

## 2024-07-15 PROCEDURE — 3075F SYST BP GE 130 - 139MM HG: CPT | Performed by: STUDENT IN AN ORGANIZED HEALTH CARE EDUCATION/TRAINING PROGRAM

## 2024-07-15 PROCEDURE — 99214 OFFICE O/P EST MOD 30 MIN: CPT | Performed by: STUDENT IN AN ORGANIZED HEALTH CARE EDUCATION/TRAINING PROGRAM

## 2024-07-15 PROCEDURE — 90833 PSYTX W PT W E/M 30 MIN: CPT | Performed by: STUDENT IN AN ORGANIZED HEALTH CARE EDUCATION/TRAINING PROGRAM

## 2024-07-15 PROCEDURE — 1159F MED LIST DOCD IN RCRD: CPT | Performed by: STUDENT IN AN ORGANIZED HEALTH CARE EDUCATION/TRAINING PROGRAM

## 2024-07-15 PROCEDURE — 1160F RVW MEDS BY RX/DR IN RCRD: CPT | Performed by: STUDENT IN AN ORGANIZED HEALTH CARE EDUCATION/TRAINING PROGRAM

## 2024-07-15 RX ORDER — MIRTAZAPINE 7.5 MG/1
7.5 TABLET, FILM COATED ORAL NIGHTLY
Qty: 90 TABLET | Refills: 1 | Status: SHIPPED | OUTPATIENT
Start: 2024-07-15

## 2024-08-01 ENCOUNTER — HOSPITAL ENCOUNTER (OUTPATIENT)
Dept: MRI IMAGING | Facility: HOSPITAL | Age: 79
Discharge: HOME | End: 2024-08-01
Admitting: NURSE PRACTITIONER
Payer: MEDICARE

## 2024-08-01 DIAGNOSIS — H93.A1 PULSATILE TINNITUS OF RIGHT EAR: ICD-10-CM

## 2024-08-01 DIAGNOSIS — R42 DIZZINESS AND GIDDINESS: ICD-10-CM

## 2024-08-01 LAB
CREAT BLDA-MCNC: 1.6 MG/DL (ref 0.6–1.3)
EGFRCR SERPLBLD CKD-EPI 2021: 44.1 ML/MIN/1.73

## 2024-08-01 PROCEDURE — 82565 ASSAY OF CREATININE: CPT

## 2024-08-01 PROCEDURE — 70553 MRI BRAIN STEM W/O & W/DYE: CPT

## 2024-08-01 PROCEDURE — A9577 INJ MULTIHANCE: HCPCS | Performed by: NURSE PRACTITIONER

## 2024-08-01 PROCEDURE — 0 GADOBENATE DIMEGLUMINE 529 MG/ML SOLUTION: Performed by: NURSE PRACTITIONER

## 2024-08-01 RX ADMIN — GADOBENATE DIMEGLUMINE 17 ML: 529 INJECTION, SOLUTION INTRAVENOUS at 18:05

## 2024-08-05 ENCOUNTER — TELEPHONE (OUTPATIENT)
Dept: PSYCHIATRY | Facility: CLINIC | Age: 79
End: 2024-08-05
Payer: MEDICARE

## 2024-08-05 NOTE — TELEPHONE ENCOUNTER
Called patient to inform of provider message    Would double the dose and check in with us at the end of the week to see if that helped.     Patient acknowledged, no questions or concerns

## 2024-08-05 NOTE — TELEPHONE ENCOUNTER
PT(PATIENT) VERIFIED     mirtazapine (REMERON) 7.5 MG tablet (07/15/2024)     PT(PATIENT) STATES THE MEDICATION DOESN'T SEEM TO BE WORKING FOR HIM     PT(PATIENT) STATES IT TAKES A LONG TIME FOR HIM TO GO TO SLEEP, THEN HE WAKES UP AFTER A SHORT TIME AND IS UNABLE TO GO BACK TO SLEEP     PT(PATIENT) STATES HE IS FATIGUED FROM LACK OF REST     NEXT APPT WITH PROVIDER   Appointment with Teddy Montes MD (2024)     PLEASE ADVISE

## 2024-08-19 ENCOUNTER — OFFICE VISIT (OUTPATIENT)
Dept: PSYCHIATRY | Facility: CLINIC | Age: 79
End: 2024-08-19
Payer: MEDICARE

## 2024-08-19 VITALS
SYSTOLIC BLOOD PRESSURE: 145 MMHG | HEART RATE: 53 BPM | DIASTOLIC BLOOD PRESSURE: 53 MMHG | WEIGHT: 193.6 LBS | BODY MASS INDEX: 27.72 KG/M2 | HEIGHT: 70 IN

## 2024-08-19 DIAGNOSIS — F51.05 INSOMNIA DUE TO MENTAL CONDITION: ICD-10-CM

## 2024-08-19 DIAGNOSIS — F33.1 MAJOR DEPRESSIVE DISORDER, RECURRENT EPISODE, MODERATE: Primary | ICD-10-CM

## 2024-08-19 DIAGNOSIS — F41.1 GENERALIZED ANXIETY DISORDER: ICD-10-CM

## 2024-08-19 PROCEDURE — 90833 PSYTX W PT W E/M 30 MIN: CPT | Performed by: STUDENT IN AN ORGANIZED HEALTH CARE EDUCATION/TRAINING PROGRAM

## 2024-08-19 PROCEDURE — 3078F DIAST BP <80 MM HG: CPT | Performed by: STUDENT IN AN ORGANIZED HEALTH CARE EDUCATION/TRAINING PROGRAM

## 2024-08-19 PROCEDURE — 1159F MED LIST DOCD IN RCRD: CPT | Performed by: STUDENT IN AN ORGANIZED HEALTH CARE EDUCATION/TRAINING PROGRAM

## 2024-08-19 PROCEDURE — 3077F SYST BP >= 140 MM HG: CPT | Performed by: STUDENT IN AN ORGANIZED HEALTH CARE EDUCATION/TRAINING PROGRAM

## 2024-08-19 PROCEDURE — 1160F RVW MEDS BY RX/DR IN RCRD: CPT | Performed by: STUDENT IN AN ORGANIZED HEALTH CARE EDUCATION/TRAINING PROGRAM

## 2024-08-19 PROCEDURE — 99214 OFFICE O/P EST MOD 30 MIN: CPT | Performed by: STUDENT IN AN ORGANIZED HEALTH CARE EDUCATION/TRAINING PROGRAM

## 2024-08-19 RX ORDER — MIRTAZAPINE 15 MG/1
15 TABLET, FILM COATED ORAL NIGHTLY
Qty: 135 TABLET | Refills: 1 | Status: SHIPPED | OUTPATIENT
Start: 2024-08-19

## 2024-08-19 NOTE — PLAN OF CARE
Amie psychotherapy to help manage depression and anxiety related to wife's situation, frustration with her worsening condition.

## 2024-08-19 NOTE — PROGRESS NOTES
"Subjective   Reji Mcmillan is a 78 y.o. male who presents today for initial evaluation     Referring Provider:  No referring provider defined for this encounter.    Chief Complaint:  depression    History of Present Illness:     2024: INITIAL VISIT Chart review:     Dewey: Ambien CR 12.5 mg nightly chronically  Care Everywhere: Several non behavioral health notes pertaining to chronic kidney disease    Psychotropic medication chart review:  Present:  Ambien CR 12.5 mg at night    Previously:  Paxil 10 mg a day    EKG: 2023: 55, sinus, QTc 419.  Procedures: none  Head imaging: none  Labs: 2024: Creatinine 1.5, glucose 103, calcium 7.6, otherwise reassuring CMP.  Reassuring A1c, thyroid studies, lipids, vitamin D, CBC.  Total saturation  Iron profile is slightly low at 18.  UDS entirely negative in 2023.  Initial Chart Review Notes: Seen by primary care in April.  PHQ-9 is a 4.  Seen as a follow-up for anxiety, insomnia.  Sleeping well on Ambien.  Having some fatigue.  Referred to us for anxiety and depression.      Patient Psychotherapy Notes:  Patient goals:  Misc:  Stage IIIb chronic kidney disease  Hx open heart surgery, 2x bypass. .      Chart Review By Dates:  2024: cr 1.60, eGFR 44. Doubled mirtazapine dose.  7/15/24: no visits.    Plannin/15/2024: Improving MDD, KIMBERLY. Wants to trial another medication besides ambien (vivid dreams, hangover after). Start mirtazapine.  : Slow wean off paxil (has been on for years), start prozac (safe in kidney dz). Wife is significant stressor. 4w    VISITS/APPOINTMENTS (BELOW):    \"Rjei\"     2024: In person interview:  \"I'm still dealing with my wife's situation.\"  Discussed anger  Takes all day to get her out of bed  She doesn't talk like she used to  Taking three mirtazapine helped with insomnia, took 30 minutes to sleep  MDD: Improving mood  KIMBERLY: improving  Panic attacks: stable  Energy: improving  Concentration: " "improving  Insomnia: initial, improving  Eating/Weight: 193, 184  Refills: y  Substances: denies  Therapy: none  Medication compliant: y  SE: n  No SI HI AVH.       07/15/2024: In person interview:  \"Well, I didn't know what to expect.\"  One of my friends noticed I'm a little more talkative.  Not dreading to do stuff.  I'd like something else for sleep. I don't want to be on narcotics.  MDD: Improving mood  KIMBERLY: improving  Panic attacks: n  Energy: improving  Concentration: improving  Insomnia: stable on ambien, but vivid dreams  Eating/Weight: 184  Refills: y  Substances: denies  Therapy: n  Medication compliant: y  SE: n  No SI HI AVH.      06/12/2024: In person.  Interview:  His/Her Story: \"Ambien helps, but it doesn't hold me down.\"  P14, G7  Racing thoughts at night  Friends have said I've changed, I'm more \"low\" and not as enthusiastic as I used to be  Has been on paxil for more than 3 years   54 years, wife has dementia, additional stressor  She's losing function  Hx open heart surgery, 2x bypass.  Depression/Mood:  Depressed mood, anhedonia,   poor energy, poor concentration,   insomnia.  Seasonal pattern: def  Severity: Moderate  Duration: Many years  Anxiety:  Uncontrolled worrying, muscle tension, fatigue, poor concentration, feeling on edge  irritability, insomnia.  Severity: Moderate  Duration: Many years  Panic attacks: n  Psych ROS: Positive for depression, anxiety.  Negative for psychosis and shweta.  ADHD: def  PTSD: def  No SI HI AVH.  Medication compliant: y    Access to Firearms:  yes, locked away    PHQ-9 Depression Screening  PHQ-9 Total Score:      Little interest or pleasure in doing things?     Feeling down, depressed, or hopeless?     Trouble falling or staying asleep, or sleeping too much?     Feeling tired or having little energy?     Poor appetite or overeating?     Feeling bad about yourself - or that you are a failure or have let yourself or your family down?     Trouble " concentrating on things, such as reading the newspaper or watching television?     Moving or speaking so slowly that other people could have noticed? Or the opposite - being so fidgety or restless that you have been moving around a lot more than usual?     Thoughts that you would be better off dead, or of hurting yourself in some way?     PHQ-9 Total Score       KIMBERLY-7       Past Surgical History:  Past Surgical History:   Procedure Laterality Date    CARDIAC SURGERY      CHOLECYSTECTOMY      COLONOSCOPY      COLONOSCOPY N/A 3/20/2023    Procedure: COLONOSCOPY;  Surgeon: Jacques Dc MD;  Location: [facility] ENDOSCOPY;  Service: General;  Laterality: N/A;  diverticulosis, hemorrhoids    CORONARY ARTERY BYPASS GRAFT      cabbage x2    DENTAL PROCEDURE      HERNIA REPAIR      TOE SURGERY Left        Problem List:  Patient Active Problem List   Diagnosis    Essential hypertension    Mixed hyperlipidemia    Hypothyroidism    Vitamin D deficiency    Anxiety and depression    Insomnia    Anemia    Chronic low back pain    Nodular hyperplasia of prostate gland    Elevated prostate specific antigen (PSA)    Disorder of arteries and arterioles, unspecified    Stage 3 chronic kidney disease    CAD s/p CABG    Peyronie disease    Vasculogenic erectile dysfunction    History of elevated PSA    Screening for malignant neoplasm of colon    History of colonic polyps    Other male erectile dysfunction       Allergy:   Allergies   Allergen Reactions    Sulfa Antibiotics Unknown - Low Severity     Flu like symptoms caused from taking medication        Discontinued Medications:  Medications Discontinued During This Encounter   Medication Reason    mirtazapine (REMERON) 7.5 MG tablet Reorder         Current Medications:   Current Outpatient Medications   Medication Sig Dispense Refill    amLODIPine (NORVASC) 5 MG tablet TAKE 1 TABLET DAILY 90 tablet 3    aspirin 81 MG EC tablet Take 1 tablet by mouth Daily.      atorvastatin (LIPITOR)  "80 MG tablet TAKE 1 TABLET DAILY 90 tablet 3    B-D TB SYRINGE 1CC/27GX1/2\" 27G X 1/2\" 1 ML misc USE FOR XIAFLEX INJECTION 4 each 3    cetirizine (zyrTEC) 10 MG tablet Take 1 tablet by mouth Daily.      Dupilumab (Dupixent) 300 MG/2ML solution pen-injector Inject 1 mL under the skin into the appropriate area as directed Every 14 (Fourteen) Days.      FLUoxetine (PROzac) 10 MG capsule Take 1 capsule by mouth Daily. 90 capsule 1    ketoconazole (NIZORAL) 2 % cream Apply 1 application  topically to the appropriate area as directed Daily. 30 g 1    lansoprazole (PREVACID) 30 MG capsule TAKE 1 CAPSULE DAILY 90 capsule 3    levothyroxine (SYNTHROID, LEVOTHROID) 25 MCG tablet TAKE 1 TABLET DAILY 90 tablet 3    losartan (COZAAR) 25 MG tablet TAKE 1 TABLET DAILY 90 tablet 3    mirtazapine (REMERON) 15 MG tablet Take 1 tablet by mouth Every Night. 135 tablet 1    ranolazine (RANEXA) 500 MG 12 hr tablet TAKE 1 TABLET TWICE A  tablet 3    tadalafil (CIALIS) 5 MG tablet Take 1 tablet by mouth Daily As Needed for Erectile Dysfunction for up to 180 days. 90 tablet 1    tamsulosin (FLOMAX) 0.4 MG capsule 24 hr capsule TAKE 1 CAPSULE DAILY 30 MINUTES AFTER DINNER MEAL 90 capsule 3    vitamin D (ERGOCALCIFEROL) 1.25 MG (38723 UT) capsule capsule Take 1 capsule by mouth 1 (One) Time Per Week. Take with a meal. (Patient not taking: Reported on 7/15/2024) 13 capsule 1    Xiaflex 0.9 MG injection  (Patient not taking: Reported on 7/15/2024)      zolpidem CR (AMBIEN CR) 12.5 MG CR tablet Take 1 tablet by mouth At Night As Needed for Sleep. (Patient not taking: Reported on 8/19/2024) 90 tablet 1     Current Facility-Administered Medications   Medication Dose Route Frequency Provider Last Rate Last Admin    collagenase clostrid histolyt (XIAFLEX) injection 0.58 mg  0.58 mg Injection Once Mellisa Montes MD        collagenase clostrid histolyt (XIAFLEX) injection 0.58 mg  0.58 mg Injection Once Mellisa Montes MD        " collagenase clostrid histolyt (XIAFLEX) injection 0.58 mg  0.58 mg Injection Once Mellisa Montes MD        collagenase clostrid histolyt (XIAFLEX) injection 0.58 mg  0.58 mg Injection Once Mellisa Montes MD        collagenase clostrid histolyt (XIAFLEX) injection 0.58 mg  0.58 mg Injection Once Mellisa Montes MD           Past Medical History:  Past Medical History:   Diagnosis Date    Allergic rhinitis due to allergen     Benign prostatic hyperplasia     CAD s/p CABG 03/30/2022    Chest pain     Chronic allergic rhinitis     CKD (chronic kidney disease) stage 3, GFR 30-59 ml/min     Eczema     Erectile dysfunction 10/19/2020    GERD (gastroesophageal reflux disease)     Heart disease     High blood pressure     High cholesterol     Hypercholesteremia     Hypertension     Controlled    Hypothyroidism     Orthostatic hypotension     Polio     Prostate disorder        Past Psychiatric History:  Began Treatment: PCP for years  Diagnoses: MDD  Psychiatrist:Denies  Therapist:Denies  Admission History:Denies  Medication Trials:    Paxil only, used to work    Self Harm: Denies  Suicide Attempts:Denies      Substance Abuse History:   Types: 1 airplane bottle 4x/wk  Withdrawal Symptoms:Denies  Longest Period Sober:Not Applicable   AA: Not applicable     Social History:  Martial Status:  Employed:No. 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    Drug use: Never    Sexual activity: Defer       Family History:   Suicide Attempts: Denies  Suicide Completions:Denies      Family History   Problem Relation Age of Onset    Dementia Mother     Anxiety  "disorder Mother     Hyperlipidemia Mother     Heart disease Mother     Hypertension Mother     Hyperlipidemia Father     Hypertension Father     Heart attack Father     Depression Sister     Anxiety disorder Sister     No Known Problems Brother     No Known Problems Maternal Aunt     No Known Problems Paternal Aunt     No Known Problems Maternal Uncle     No Known Problems Paternal Uncle     No Known Problems Maternal Grandfather     No Known Problems Maternal Grandmother     No Known Problems Paternal Grandfather     No Known Problems Paternal Grandmother     No Known Problems Cousin     No Known Problems Other     Malig Hyperthermia Neg Hx     ADD / ADHD Neg Hx     Alcohol abuse Neg Hx     Bipolar disorder Neg Hx     Drug abuse Neg Hx     OCD Neg Hx     Paranoid behavior Neg Hx     Schizophrenia Neg Hx     Seizures Neg Hx     Self-Injurious Behavior  Neg Hx     Suicide Attempts Neg Hx        Developmental History:       Childhood: Denies Abuse  High School:Completed  College: 4 years, education degree    Mental Status Exam  Appearance  : groomed, good eye contact, normal street clothes  Behavior  : pleasant and cooperative  Motor  : No abnormal  Speech  :normal rhythm, rate, volume, tone, not hyperverbal, not pressured, normal prosidy  Mood  : \"I'm better, sleeping better\"  Affect  : mild constriction, nearly euthymic, mood congruent, good variability  Thought Content  : negative suicidal ideations, negative homicidal ideations, negative obsessions  Perceptions  : negative auditory hallucinations, negative visual hallucinations  Thought Process  : linear  Insight/Judgement  : Fair/fair  Cognition  : grossly intact  Attention   : intact      Review of Systems:  Review of Systems   Constitutional:  Positive for fatigue. Negative for diaphoresis.   HENT:  Negative for drooling.    Eyes:  Negative for visual disturbance.   Respiratory:  Negative for cough, chest tightness and shortness of breath.    Cardiovascular:  " "Negative for chest pain, palpitations and leg swelling.   Gastrointestinal:  Negative for abdominal pain, diarrhea, nausea and vomiting.   Endocrine: Negative for cold intolerance and heat intolerance.   Genitourinary:  Negative for difficulty urinating.   Musculoskeletal:  Negative for joint swelling.   Allergic/Immunologic: Negative for immunocompromised state.   Neurological:  Positive for numbness. Negative for dizziness, seizures, speech difficulty, light-headedness and headaches.   Psychiatric/Behavioral:  Positive for sleep disturbance. Negative for agitation.        Physical Exam:  Physical Exam    Vital Signs:   /53   Pulse 53   Ht 177.8 cm (70\")   Wt 87.8 kg (193 lb 9.6 oz)   BMI 27.78 kg/m²      Lab Results:   Hospital Outpatient Visit on 08/01/2024   Component Date Value Ref Range Status    Creatinine 08/01/2024 1.60 (H)  0.60 - 1.30 mg/dL Final    Serial Number: [id number]:  [id number]    eGFR 08/01/2024 44.1 (L)  >60.0 mL/min/1.73 Final   Hospital Outpatient Visit on 06/07/2024   Component Date Value Ref Range Status    Right arm BP 06/07/2024 134/71  mmHg Final    Left arm BP 06/07/2024 138/71  mmHg Final    Prox CCA PSV 06/07/2024 89.4  cm/sec Final    Prox CCA EDV 06/07/2024 11.1  cm/sec Final    Dist CCA PSV 06/07/2024 101.8  cm/sec Final    Dist CCA EDV 06/07/2024 17.4  cm/sec Final    Prox ICA PSV 06/07/2024 80.1  cm/sec Final    Prox ICA EDV 06/07/2024 16.1  cm/sec Final    Mid ICA PSV 06/07/2024 90.0  cm/sec Final    Mid ICA EDV 06/07/2024 19.3  cm/sec Final    Dist ICA PSV 06/07/2024 78.9  cm/sec Final    Dist ICA EDV 06/07/2024 18.6  cm/sec Final    Prox ECA PSV 06/07/2024 113.0  cm/sec Final    Prox ECA EDV 06/07/2024 9.3  cm/sec Final    Vertebral A PSV 06/07/2024 35.0  cm/sec Final    Vertebral A EDV 06/07/2024 9.5  cm/sec Final    Prox CCA PSV 06/07/2024 91.8  cm/sec Final    Prox CCA EDV 06/07/2024 11.6  cm/sec Final    Dist CCA PSV 06/07/2024 87.6  cm/sec Final    Dist CCA EDV " 06/07/2024 11.0  cm/sec Final    Prox ICA PSV 06/07/2024 77.2  cm/sec Final    Prox ICA EDV 06/07/2024 13.0  cm/sec Final    Mid ICA PSV 06/07/2024 77.9  cm/sec Final    Mid ICA EDV 06/07/2024 18.8  cm/sec Final    Dist ICA PSV 06/07/2024 88.3  cm/sec Final    Dist ICA EDV 06/07/2024 15.6  cm/sec Final    Prox ECA PSV 06/07/2024 92.2  cm/sec Final    Prox ECA EDV 06/07/2024 9.1  cm/sec Final    Vertebral A PSV 06/07/2024 38.3  cm/sec Final    Vertebral A EDV 06/07/2024 10.4  cm/sec Final    XLRA MERRY RIGHT CAROTID BULB PSV 06/07/2024 97.5  cm/sec Final    XLRA MERRY RIGHT CAROTID BULB EDV 06/07/2024 15.5  cm/sec Final    XLRA MERRY LEFT CAROTID BULB PSV 06/07/2024 63.0  cm/sec Final    XLRA MERRY LEFT CAROTID BULB EDV 06/07/2024 9.7  cm/sec Final    ICA/CCA ratio 06/07/2024 0.80   Final    ICA/CCA ratio 06/07/2024 0.90   Final   Lab on 04/17/2024   Component Date Value Ref Range Status    Glucose 04/17/2024 103 (H)  65 - 99 mg/dL Final    BUN 04/17/2024 16  8 - 23 mg/dL Final    Creatinine 04/17/2024 1.50 (H)  0.76 - 1.27 mg/dL Final    Sodium 04/17/2024 140  136 - 145 mmol/L Final    Potassium 04/17/2024 4.3  3.5 - 5.2 mmol/L Final    Chloride 04/17/2024 105  98 - 107 mmol/L Final    CO2 04/17/2024 27.0  22.0 - 29.0 mmol/L Final    Calcium 04/17/2024 7.6 (L)  8.6 - 10.5 mg/dL Final    Total Protein 04/17/2024 7.1  6.0 - 8.5 g/dL Final    Albumin 04/17/2024 4.1  3.5 - 5.2 g/dL Final    ALT (SGPT) 04/17/2024 13  1 - 41 U/L Final    AST (SGOT) 04/17/2024 11  1 - 40 U/L Final    Alkaline Phosphatase 04/17/2024 52  39 - 117 U/L Final    Total Bilirubin 04/17/2024 0.5  0.0 - 1.2 mg/dL Final    Globulin 04/17/2024 3.0  gm/dL Final    A/G Ratio 04/17/2024 1.4  g/dL Final    BUN/Creatinine Ratio 04/17/2024 10.7  7.0 - 25.0 Final    Anion Gap 04/17/2024 8.0  5.0 - 15.0 mmol/L Final    eGFR 04/17/2024 47.7 (L)  >60.0 mL/min/1.73 Final    Total Cholesterol 04/17/2024 137  0 - 200 mg/dL Final    Triglycerides 04/17/2024 58  0 -  150 mg/dL Final    HDL Cholesterol 04/17/2024 65 (H)  40 - 60 mg/dL Final    LDL Cholesterol  04/17/2024 60  0 - 100 mg/dL Final    VLDL Cholesterol 04/17/2024 12  5 - 40 mg/dL Final    LDL/HDL Ratio 04/17/2024 0.93   Final    Free T4 04/17/2024 1.02  0.93 - 1.70 ng/dL Final    TSH 04/17/2024 3.480  0.270 - 4.200 uIU/mL Final    25 Hydroxy, Vitamin D 04/17/2024 32.9  30.0 - 100.0 ng/ml Final    Hemoglobin A1C 04/17/2024 5.50  4.80 - 5.60 % Final    Vitamin B-12 04/17/2024 319  211 - 946 pg/mL Final    Folate 04/17/2024 10.20  4.78 - 24.20 ng/mL Final    Magnesium 04/17/2024 1.9  1.6 - 2.4 mg/dL Final    Ferritin 04/17/2024 31.80  30.00 - 400.00 ng/mL Final    Iron 04/17/2024 71  59 - 158 mcg/dL Final    Iron Saturation (TSAT) 04/17/2024 18 (L)  20 - 50 % Final    Transferrin 04/17/2024 260  200 - 360 mg/dL Final    TIBC 04/17/2024 387  298 - 536 mcg/dL Final    WBC 04/17/2024 5.21  3.40 - 10.80 10*3/mm3 Final    RBC 04/17/2024 4.85  4.14 - 5.80 10*6/mm3 Final    Hemoglobin 04/17/2024 13.0  13.0 - 17.7 g/dL Final    Hematocrit 04/17/2024 39.1  37.5 - 51.0 % Final    MCV 04/17/2024 80.6  79.0 - 97.0 fL Final    MCH 04/17/2024 26.8  26.6 - 33.0 pg Final    MCHC 04/17/2024 33.2  31.5 - 35.7 g/dL Final    RDW 04/17/2024 13.8  12.3 - 15.4 % Final    RDW-SD 04/17/2024 40.2  37.0 - 54.0 fl Final    MPV 04/17/2024 10.6  6.0 - 12.0 fL Final    Platelets 04/17/2024 240  140 - 450 10*3/mm3 Final    Neutrophil % 04/17/2024 65.4  42.7 - 76.0 % Final    Lymphocyte % 04/17/2024 20.9  19.6 - 45.3 % Final    Monocyte % 04/17/2024 10.6  5.0 - 12.0 % Final    Eosinophil % 04/17/2024 2.5  0.3 - 6.2 % Final    Basophil % 04/17/2024 0.2  0.0 - 1.5 % Final    Immature Grans % 04/17/2024 0.4  0.0 - 0.5 % Final    Neutrophils, Absolute 04/17/2024 3.41  1.70 - 7.00 10*3/mm3 Final    Lymphocytes, Absolute 04/17/2024 1.09  0.70 - 3.10 10*3/mm3 Final    Monocytes, Absolute 04/17/2024 0.55  0.10 - 0.90 10*3/mm3 Final    Eosinophils, Absolute  04/17/2024 0.13  0.00 - 0.40 10*3/mm3 Final    Basophils, Absolute 04/17/2024 0.01  0.00 - 0.20 10*3/mm3 Final    Immature Grans, Absolute 04/17/2024 0.02  0.00 - 0.05 10*3/mm3 Final    nRBC 04/17/2024 0.0  0.0 - 0.2 /100 WBC Final   Lab on 03/29/2024   Component Date Value Ref Range Status    Glucose 03/29/2024 90  65 - 99 mg/dL Final    BUN 03/29/2024 19  8 - 23 mg/dL Final    Creatinine 03/29/2024 1.60 (H)  0.76 - 1.27 mg/dL Final    Sodium 03/29/2024 140  136 - 145 mmol/L Final    Potassium 03/29/2024 4.7  3.5 - 5.2 mmol/L Final    Chloride 03/29/2024 103  98 - 107 mmol/L Final    CO2 03/29/2024 25.8  22.0 - 29.0 mmol/L Final    Calcium 03/29/2024 9.1  8.6 - 10.5 mg/dL Final    Albumin 03/29/2024 4.2  3.5 - 5.2 g/dL Final    Phosphorus 03/29/2024 3.8  2.5 - 4.5 mg/dL Final    Anion Gap 03/29/2024 11.2  5.0 - 15.0 mmol/L Final    BUN/Creatinine Ratio 03/29/2024 11.9  7.0 - 25.0 Final    eGFR 03/29/2024 44.1 (L)  >60.0 mL/min/1.73 Final    25 Hydroxy, Vitamin D 03/29/2024 38.8  30.0 - 100.0 ng/ml Final    PTH, Intact 03/29/2024 44.3  15.0 - 65.0 pg/mL Final    WBC 03/29/2024 6.10  3.40 - 10.80 10*3/mm3 Final    RBC 03/29/2024 4.81  4.14 - 5.80 10*6/mm3 Final    Hemoglobin 03/29/2024 13.2  13.0 - 17.7 g/dL Final    Hematocrit 03/29/2024 39.2  37.5 - 51.0 % Final    MCV 03/29/2024 81.5  79.0 - 97.0 fL Final    MCH 03/29/2024 27.4  26.6 - 33.0 pg Final    MCHC 03/29/2024 33.7  31.5 - 35.7 g/dL Final    RDW 03/29/2024 13.8  12.3 - 15.4 % Final    RDW-SD 03/29/2024 41.1  37.0 - 54.0 fl Final    MPV 03/29/2024 11.0  6.0 - 12.0 fL Final    Platelets 03/29/2024 252  140 - 450 10*3/mm3 Final    Neutrophil % 03/29/2024 68.7  42.7 - 76.0 % Final    Lymphocyte % 03/29/2024 19.7  19.6 - 45.3 % Final    Monocyte % 03/29/2024 10.0  5.0 - 12.0 % Final    Eosinophil % 03/29/2024 1.1  0.3 - 6.2 % Final    Basophil % 03/29/2024 0.3  0.0 - 1.5 % Final    Immature Grans % 03/29/2024 0.2  0.0 - 0.5 % Final    Neutrophils, Absolute  03/29/2024 4.19  1.70 - 7.00 10*3/mm3 Final    Lymphocytes, Absolute 03/29/2024 1.20  0.70 - 3.10 10*3/mm3 Final    Monocytes, Absolute 03/29/2024 0.61  0.10 - 0.90 10*3/mm3 Final    Eosinophils, Absolute 03/29/2024 0.07  0.00 - 0.40 10*3/mm3 Final    Basophils, Absolute 03/29/2024 0.02  0.00 - 0.20 10*3/mm3 Final    Immature Grans, Absolute 03/29/2024 0.01  0.00 - 0.05 10*3/mm3 Final    nRBC 03/29/2024 0.0  0.0 - 0.2 /100 WBC Final    Color, UA 03/29/2024 Yellow  Yellow, Straw Final    Appearance, UA 03/29/2024 Cloudy (A)  Clear Final    pH, UA 03/29/2024 5.5  5.0 - 8.0 Final    Specific Gravity, UA 03/29/2024 1.020  1.005 - 1.030 Final    Glucose, UA 03/29/2024 Negative  Negative Final    Ketones, UA 03/29/2024 Negative  Negative Final    Bilirubin, UA 03/29/2024 Negative  Negative Final    Blood, UA 03/29/2024 Negative  Negative Final    Protein, UA 03/29/2024 Trace (A)  Negative Final    Leuk Esterase, UA 03/29/2024 Trace (A)  Negative Final    Nitrite, UA 03/29/2024 Negative  Negative Final    Urobilinogen, UA 03/29/2024 0.2 E.U./dL  0.2 - 1.0 E.U./dL Final    Creatinine, Urine 03/29/2024 170.7  mg/dL Final    Total Protein, Urine 03/29/2024 13.8  mg/dL Final    Protein/Creatinine Ratio, Urine 03/29/2024 0.08   Final    RBC, UA 03/29/2024 0-2  None Seen, 0-2 /HPF Final    WBC, UA 03/29/2024 0-2  None Seen, 0-2 /HPF Final    Bacteria, UA 03/29/2024 None Seen  None Seen /HPF Final    Squamous Epithelial Cells, UA 03/29/2024 0-2  None Seen, 0-2 /HPF Final    Hyaline Casts, UA 03/29/2024 0-2  None Seen /LPF Final    Methodology 03/29/2024 Automated Microscopy   Final   Office Visit on 03/04/2024   Component Date Value Ref Range Status    Color 03/04/2024 Yellow  Yellow, Straw, Dark Yellow, Juliet Final    Clarity, UA 03/04/2024 Clear  Clear Final    Specific Gravity  03/04/2024 1.030  1.005 - 1.030 Final    pH, Urine 03/04/2024 5.5  5.0 - 8.0 Final    Leukocytes 03/04/2024 Negative  Negative Final    Nitrite, UA  03/04/2024 Negative  Negative Final    Protein, POC 03/04/2024 Negative  Negative mg/dL Final    Glucose, UA 03/04/2024 Negative  Negative mg/dL Final    Ketones, UA 03/04/2024 Negative  Negative Final    Urobilinogen, UA 03/04/2024 0.2 E.U./dL  Normal, 0.2 E.U./dL Final    Bilirubin 03/04/2024 Small (1+) (A)  Negative Final    Blood, UA 03/04/2024 Negative  Negative Final    Lot Number 03/04/2024 [id number]   Final    Expiration Date 03/04/2024 11-   Final       EKG Results:  No orders to display       Imaging Results:  No Images in the past 120 days found..      Assessment & Plan   Diagnoses and all orders for this visit:    1. Major depressive disorder, recurrent episode, moderate (Primary)  -     mirtazapine (REMERON) 15 MG tablet; Take 1 tablet by mouth Every Night.  Dispense: 135 tablet; Refill: 1    2. Generalized anxiety disorder  -     mirtazapine (REMERON) 15 MG tablet; Take 1 tablet by mouth Every Night.  Dispense: 135 tablet; Refill: 1    3. Insomnia due to mental condition  -     mirtazapine (REMERON) 15 MG tablet; Take 1 tablet by mouth Every Night.  Dispense: 135 tablet; Refill: 1        Visit Diagnoses:    ICD-10-CM ICD-9-CM   1. Major depressive disorder, recurrent episode, moderate  F33.1 296.32   2. Generalized anxiety disorder  F41.1 300.02   3. Insomnia due to mental condition  F51.05 300.9     327.02     08/19/2024: Improving, increase mirtazapine. Thinking about getting help with his wife at home.    Allowed patient to freely discuss and process issues, such as:  Anxiety and depression regarding family's well-being, wife.  Anxiety and depression regarding taking care of his wife.  ... using Rogerian psychotherapeutic techniques including unconditional positive regard, reflective listening, and demonstrating clear empathy, with the goal of ameliorating symptoms and maintaining, restoring, or improving self-esteem, adaptive skills, and ego or psychological functions (Shea et al.  1991).  Time (minutes) spent providing supportive psychotherapy: 16  (This time is exclusive to the therapy session and separate from the time spent on activities used to meet the criteria for the E/M service (history, exam, medical decision-making).)  Start: 10:26  Stop: 11:42  Functional status: mild impairment  Treatment plan: Medication management and supportive psychotherapy  Prognosis: good  Progress: improving  4w    07/15/2024: Improving MDD, KIMBERLY. Wants to trial another medication besides ambien (vivid dreams, hangover after). Start mirtazapine.    6/12: Slow wean off paxil (has been on for years), start prozac (safe in kidney dz). Wife is significant stressor. 4w    PLAN:  Safety: No acute safety concerns  Therapy: None  Risk Assessment: Risk of self-harm acutely is moderate.  Risk factors include anxiety disorder, mood disorder, access to firearms, and recent psychosocial stressors (pandemic). Protective factors include no family history, no present SI, no history of suicide attempts or self-harm in the past, minimal AODA, healthcare seeking, future orientation, willingness to engage in care.  Risk of self-harm chronically is also moderate, but could be further elevated in the event of treatment noncompliance and/or AODA.  Meds:  INCREASE mirtazapine 7.5 to 22.5 mg qhs (max dose 22.5 mg given eGFR). Risks, benefits, alternatives discussed with patient including GI upset, sedation, dizziness with falls risk, increased appetite.  Do not use before operating vehicle, vessel, or machine. After discussion of these risks and benefits, the patient voiced understanding and agreed to proceed.  CONTINUE prozac 10 mg qam. Risks, benefits, alternatives discussed with patient including GI upset, nausea vomiting diarrhea, theoretical decrease of seizure threshold predisposing the patient to a slightly higher seizure risk, headaches, sexual dysfunction, serotonin syndrome, bleeding risk, increased suicidality in patients  24 years and younger, switching to shweta/hypomania.  After discussion of these risks and benefits, the patient voiced understanding and agreed to proceed.  S/P:  paxil 10 mg qam. Ineffective 6/24  Labs: none    Patient screened positive for depression based on a PHQ-9 score of 14 on 6/12/2024. Follow-up recommendations include: Prescribed antidepressant medication treatment and Suicide Risk Assessment performed.           TREATMENT PLAN/GOALS: Continue supportive psychotherapy efforts and medications as indicated. Treatment and medication options discussed during today's visit. Patient acknowledged and verbally consented to continue with current treatment plan and was educated on the importance of compliance with treatment and follow-up appointments.    MEDICATION ISSUES:  REENA reviewed as expected.  Discussed medication options and treatment plan of prescribed medication as well as the risks, benefits, and side effects including potential falls, possible impaired driving and metabolic adversities among others. Patient is agreeable to call the office with any worsening of symptoms or onset of side effects. Patient is agreeable to call 911 or go to the nearest ER should he/she begin having SI/HI. No medication side effects or related complaints today.     MEDS ORDERED DURING VISIT:  New Medications Ordered This Visit   Medications    mirtazapine (REMERON) 15 MG tablet     Sig: Take 1 tablet by mouth Every Night.     Dispense:  135 tablet     Refill:  1     Replaces 7.5 mg dose.       Return in about 4 weeks (around 9/16/2024).         This document has been electronically signed by Teddy Montes MD  August 19, 2024 10:48 EDT    Dictated Utilizing Dragon Dictation: Part of this note may be an electronic transcription/translation of spoken language to printed text using the Dragon Dictation System.

## 2024-08-19 NOTE — TREATMENT PLAN
Multi-Disciplinary Problems (from Behavioral Health Treatment Plan)      Active Problems       Problem: Anger  Start Date: 08/19/24      Problem Details: The patient self-scales this problem as a 5 with 10 being the worst.    Wife's situation, frustration with her worsening condition.        Goal Priority Start Date Expected End Date End Date    Patient will develop specific, socially acceptable way to manage anger. -- 08/19/24 02/17/25 --    Goal Details: Progress toward goal:  improving          Goal Intervention Frequency Start Date End Date    Process patient's angry feelings or outbursts that have recently occurred and review alternative behaviors Q Month 08/19/24 --    Intervention Details: Duration of treatment until remission of symptoms.          Goal Intervention Frequency Start Date End Date    Work with patient to develop constructive way to handle anger. Q Month 08/19/24 --    Intervention Details: Duration of treatment until remission of symptoms.                  Problem: Anxiety  Start Date: 08/19/24      Problem Details: The patient self-scales this problem as a 5 with 10 being the worst.    Wife's situation, frustration with her worsening condition.        Goal Priority Start Date Expected End Date End Date    Patient will develop and implement behavioral and cognitive strategies to reduce anxiety and irrational fears. -- 08/19/24 02/17/25 --    Goal Details: Progress toward goal:  improving.          Goal Intervention Frequency Start Date End Date    Help patient explore past emotional issues in relation to present anxiety. Q Month 08/19/24 --    Intervention Details: Duration of treatment until remission of symptoms.          Goal Intervention Frequency Start Date End Date    Help patient develop an awareness of their cognitive and physical responses to anxiety. Q Month 08/19/24 --    Intervention Details: Duration of treatment until remission of symptoms.                  Problem: Depression   Start Date: 08/19/24      Problem Details: The patient self-scales this problem as a 5 with 10 being the worst.    Wife's situation, frustration with her worsening condition.        Goal Priority Start Date Expected End Date End Date    Patient will demonstrate the ability to initiate new constructive life skills outside of sessions on a consistent basis. -- 08/19/24 02/17/25 --    Goal Details: Progress toward goal:  improving          Goal Intervention Frequency Start Date End Date    Assist patient in setting attainable activities of daily living goals. PRN 08/19/24 --      Goal Intervention Frequency Start Date End Date    Provide education about depression Q Month 08/19/24 --    Intervention Details: Duration of treatment until remission of symptoms.          Goal Intervention Frequency Start Date End Date    Assist patient in developing healthy coping strategies. Weekly 08/19/24 --    Intervention Details: Duration of treatment until remission of symptoms.                          Reviewed By       Teddy Montes MD 08/19/24 1038                     I have discussed and reviewed this treatment plan with the patient.

## 2024-08-19 NOTE — PATIENT INSTRUCTIONS
1.  Please return to clinic at your next scheduled visit.  Contact the clinic ([phone]) at least 24 hours prior in the event you need to cancel.  2.  Do no harm to yourself or others.    3.  Avoid alcohol and drugs.    4.  Take all medications as prescribed.  Please contact the clinic with any concerns. If you are in need of medication refills, please call the clinic at [phone].    5. Should you want to get in touch with your provider, Dr. Teddy Montes, please utilize [employer] or contact the office ([phone]), and staff will be able to page Dr. Montes directly.  6.  In the event you have personal crisis, contact the following crisis numbers: Suicide Prevention Hotline 1-917.403.8257; [facility] Helpline 8-215-573-[facility]WARD; [facility] Emergency Room [phone]; text HELLO to 974191; or [phone].

## 2024-09-23 ENCOUNTER — OFFICE VISIT (OUTPATIENT)
Dept: PSYCHIATRY | Facility: CLINIC | Age: 79
End: 2024-09-23
Payer: MEDICARE

## 2024-09-23 VITALS
DIASTOLIC BLOOD PRESSURE: 52 MMHG | BODY MASS INDEX: 27.26 KG/M2 | HEART RATE: 63 BPM | SYSTOLIC BLOOD PRESSURE: 126 MMHG | HEIGHT: 70 IN | WEIGHT: 190.4 LBS

## 2024-09-23 DIAGNOSIS — F41.1 GENERALIZED ANXIETY DISORDER: ICD-10-CM

## 2024-09-23 DIAGNOSIS — F33.1 MAJOR DEPRESSIVE DISORDER, RECURRENT EPISODE, MODERATE: Primary | ICD-10-CM

## 2024-09-23 DIAGNOSIS — F51.05 INSOMNIA DUE TO MENTAL CONDITION: ICD-10-CM

## 2024-09-23 PROCEDURE — 1159F MED LIST DOCD IN RCRD: CPT | Performed by: STUDENT IN AN ORGANIZED HEALTH CARE EDUCATION/TRAINING PROGRAM

## 2024-09-23 PROCEDURE — 3074F SYST BP LT 130 MM HG: CPT | Performed by: STUDENT IN AN ORGANIZED HEALTH CARE EDUCATION/TRAINING PROGRAM

## 2024-09-23 PROCEDURE — 1160F RVW MEDS BY RX/DR IN RCRD: CPT | Performed by: STUDENT IN AN ORGANIZED HEALTH CARE EDUCATION/TRAINING PROGRAM

## 2024-09-23 PROCEDURE — 90833 PSYTX W PT W E/M 30 MIN: CPT | Performed by: STUDENT IN AN ORGANIZED HEALTH CARE EDUCATION/TRAINING PROGRAM

## 2024-09-23 PROCEDURE — 99214 OFFICE O/P EST MOD 30 MIN: CPT | Performed by: STUDENT IN AN ORGANIZED HEALTH CARE EDUCATION/TRAINING PROGRAM

## 2024-09-23 PROCEDURE — 3078F DIAST BP <80 MM HG: CPT | Performed by: STUDENT IN AN ORGANIZED HEALTH CARE EDUCATION/TRAINING PROGRAM

## 2024-09-24 ENCOUNTER — TRANSCRIBE ORDERS (OUTPATIENT)
Dept: LAB | Facility: HOSPITAL | Age: 79
End: 2024-09-24
Payer: MEDICARE

## 2024-09-24 DIAGNOSIS — N18.32 CHRONIC KIDNEY DISEASE (CKD) STAGE G3B/A1, MODERATELY DECREASED GLOMERULAR FILTRATION RATE (GFR) BETWEEN 30-44 ML/MIN/1.73 SQUARE METER AND ALBUMINURIA CREATININE RATIO LESS THAN 30 MG/G (CMS/H*: Primary | ICD-10-CM

## 2024-09-24 DIAGNOSIS — E55.9 VITAMIN D DEFICIENCY, UNSPECIFIED: ICD-10-CM

## 2024-09-26 ENCOUNTER — LAB (OUTPATIENT)
Dept: LAB | Facility: HOSPITAL | Age: 79
End: 2024-09-26
Payer: MEDICARE

## 2024-09-26 DIAGNOSIS — R73.01 IMPAIRED FASTING GLUCOSE: ICD-10-CM

## 2024-09-26 DIAGNOSIS — I10 ESSENTIAL HYPERTENSION: ICD-10-CM

## 2024-09-26 DIAGNOSIS — E03.9 HYPOTHYROIDISM, UNSPECIFIED TYPE: ICD-10-CM

## 2024-09-26 DIAGNOSIS — Z87.898 HISTORY OF ELEVATED PSA: ICD-10-CM

## 2024-09-26 DIAGNOSIS — N18.32 CHRONIC KIDNEY DISEASE (CKD) STAGE G3B/A1, MODERATELY DECREASED GLOMERULAR FILTRATION RATE (GFR) BETWEEN 30-44 ML/MIN/1.73 SQUARE METER AND ALBUMINURIA CREATININE RATIO LESS THAN 30 MG/G (CMS/H*: ICD-10-CM

## 2024-09-26 DIAGNOSIS — N40.0 BENIGN PROSTATIC HYPERPLASIA WITHOUT LOWER URINARY TRACT SYMPTOMS: ICD-10-CM

## 2024-09-26 DIAGNOSIS — D50.9 IRON DEFICIENCY ANEMIA, UNSPECIFIED IRON DEFICIENCY ANEMIA TYPE: ICD-10-CM

## 2024-09-26 DIAGNOSIS — E55.9 VITAMIN D DEFICIENCY, UNSPECIFIED: ICD-10-CM

## 2024-09-26 DIAGNOSIS — E55.9 VITAMIN D DEFICIENCY: ICD-10-CM

## 2024-09-26 DIAGNOSIS — E78.2 MIXED HYPERLIPIDEMIA: ICD-10-CM

## 2024-09-26 LAB
25(OH)D3 SERPL-MCNC: 33.3 NG/ML (ref 30–100)
ALBUMIN SERPL-MCNC: 4.2 G/DL (ref 3.5–5.2)
ALBUMIN/GLOB SERPL: 1.4 G/DL
ALP SERPL-CCNC: 64 U/L (ref 39–117)
ALT SERPL W P-5'-P-CCNC: 15 U/L (ref 1–41)
ANION GAP SERPL CALCULATED.3IONS-SCNC: 10.9 MMOL/L (ref 5–15)
AST SERPL-CCNC: 20 U/L (ref 1–40)
BACTERIA UR QL AUTO: NORMAL /HPF
BASOPHILS # BLD AUTO: 0.01 10*3/MM3 (ref 0–0.2)
BASOPHILS NFR BLD AUTO: 0.1 % (ref 0–1.5)
BILIRUB SERPL-MCNC: 0.4 MG/DL (ref 0–1.2)
BILIRUB UR QL STRIP: NEGATIVE
BUN SERPL-MCNC: 17 MG/DL (ref 8–23)
BUN/CREAT SERPL: 11 (ref 7–25)
CALCIUM SPEC-SCNC: 9.1 MG/DL (ref 8.6–10.5)
CHLORIDE SERPL-SCNC: 106 MMOL/L (ref 98–107)
CHOLEST SERPL-MCNC: 137 MG/DL (ref 0–200)
CLARITY UR: ABNORMAL
CO2 SERPL-SCNC: 23.1 MMOL/L (ref 22–29)
COLOR UR: ABNORMAL
CREAT SERPL-MCNC: 1.54 MG/DL (ref 0.76–1.27)
CREAT UR-MCNC: 221.2 MG/DL
DEPRECATED RDW RBC AUTO: 42.6 FL (ref 37–54)
EGFRCR SERPLBLD CKD-EPI 2021: 45.9 ML/MIN/1.73
EOSINOPHIL # BLD AUTO: 0.08 10*3/MM3 (ref 0–0.4)
EOSINOPHIL NFR BLD AUTO: 1.1 % (ref 0.3–6.2)
ERYTHROCYTE [DISTWIDTH] IN BLOOD BY AUTOMATED COUNT: 13.6 % (ref 12.3–15.4)
FOLATE SERPL-MCNC: >20 NG/ML (ref 4.78–24.2)
GLOBULIN UR ELPH-MCNC: 3 GM/DL
GLUCOSE SERPL-MCNC: 106 MG/DL (ref 65–99)
GLUCOSE UR STRIP-MCNC: NEGATIVE MG/DL
HBA1C MFR BLD: 5.5 % (ref 4.8–5.6)
HCT VFR BLD AUTO: 36.8 % (ref 37.5–51)
HDLC SERPL-MCNC: 55 MG/DL (ref 40–60)
HGB BLD-MCNC: 12.3 G/DL (ref 13–17.7)
HGB UR QL STRIP.AUTO: NEGATIVE
HYALINE CASTS UR QL AUTO: NORMAL /LPF
IMM GRANULOCYTES # BLD AUTO: 0.05 10*3/MM3 (ref 0–0.05)
IMM GRANULOCYTES NFR BLD AUTO: 0.7 % (ref 0–0.5)
IRON 24H UR-MRATE: 40 MCG/DL (ref 59–158)
KETONES UR QL STRIP: ABNORMAL
LDLC SERPL CALC-MCNC: 65 MG/DL (ref 0–100)
LDLC/HDLC SERPL: 1.17 {RATIO}
LEUKOCYTE ESTERASE UR QL STRIP.AUTO: ABNORMAL
LYMPHOCYTES # BLD AUTO: 0.92 10*3/MM3 (ref 0.7–3.1)
LYMPHOCYTES NFR BLD AUTO: 13.1 % (ref 19.6–45.3)
MAGNESIUM SERPL-MCNC: 1.9 MG/DL (ref 1.6–2.4)
MCH RBC QN AUTO: 28.7 PG (ref 26.6–33)
MCHC RBC AUTO-ENTMCNC: 33.4 G/DL (ref 31.5–35.7)
MCV RBC AUTO: 85.8 FL (ref 79–97)
MONOCYTES # BLD AUTO: 0.64 10*3/MM3 (ref 0.1–0.9)
MONOCYTES NFR BLD AUTO: 9.1 % (ref 5–12)
NEUTROPHILS NFR BLD AUTO: 5.32 10*3/MM3 (ref 1.7–7)
NEUTROPHILS NFR BLD AUTO: 75.9 % (ref 42.7–76)
NITRITE UR QL STRIP: NEGATIVE
NRBC BLD AUTO-RTO: 0 /100 WBC (ref 0–0.2)
PH UR STRIP.AUTO: 5.5 [PH] (ref 5–8)
PHOSPHATE SERPL-MCNC: 2.4 MG/DL (ref 2.5–4.5)
PLATELET # BLD AUTO: 288 10*3/MM3 (ref 140–450)
PMV BLD AUTO: 10.7 FL (ref 6–12)
POTASSIUM SERPL-SCNC: 4.2 MMOL/L (ref 3.5–5.2)
PROT ?TM UR-MCNC: 25.1 MG/DL
PROT SERPL-MCNC: 7.2 G/DL (ref 6–8.5)
PROT UR QL STRIP: ABNORMAL
PROT/CREAT UR: 0.11 MG/G{CREAT}
PSA SERPL-MCNC: 4.76 NG/ML (ref 0–4)
PTH-INTACT SERPL-MCNC: 34.2 PG/ML (ref 15–65)
RBC # BLD AUTO: 4.29 10*6/MM3 (ref 4.14–5.8)
RBC # UR STRIP: NORMAL /HPF
REF LAB TEST METHOD: NORMAL
SODIUM SERPL-SCNC: 140 MMOL/L (ref 136–145)
SP GR UR STRIP: 1.02 (ref 1–1.03)
SQUAMOUS #/AREA URNS HPF: NORMAL /HPF
T4 FREE SERPL-MCNC: 1.18 NG/DL (ref 0.92–1.68)
TRIGL SERPL-MCNC: 88 MG/DL (ref 0–150)
TSH SERPL DL<=0.05 MIU/L-ACNC: 2.72 UIU/ML (ref 0.27–4.2)
UROBILINOGEN UR QL STRIP: ABNORMAL
VIT B12 BLD-MCNC: 461 PG/ML (ref 211–946)
VLDLC SERPL-MCNC: 17 MG/DL (ref 5–40)
WBC # UR STRIP: NORMAL /HPF
WBC NRBC COR # BLD AUTO: 7.02 10*3/MM3 (ref 3.4–10.8)

## 2024-09-26 PROCEDURE — 82570 ASSAY OF URINE CREATININE: CPT

## 2024-09-26 PROCEDURE — 83036 HEMOGLOBIN GLYCOSYLATED A1C: CPT

## 2024-09-26 PROCEDURE — 36415 COLL VENOUS BLD VENIPUNCTURE: CPT

## 2024-09-26 PROCEDURE — 82746 ASSAY OF FOLIC ACID SERUM: CPT

## 2024-09-26 PROCEDURE — 82306 VITAMIN D 25 HYDROXY: CPT

## 2024-09-26 PROCEDURE — 84156 ASSAY OF PROTEIN URINE: CPT

## 2024-09-26 PROCEDURE — 83540 ASSAY OF IRON: CPT

## 2024-09-26 PROCEDURE — 83735 ASSAY OF MAGNESIUM: CPT

## 2024-09-26 PROCEDURE — 82607 VITAMIN B-12: CPT

## 2024-09-26 PROCEDURE — 85025 COMPLETE CBC W/AUTO DIFF WBC: CPT

## 2024-09-26 PROCEDURE — 81001 URINALYSIS AUTO W/SCOPE: CPT

## 2024-09-26 PROCEDURE — 84439 ASSAY OF FREE THYROXINE: CPT

## 2024-09-26 PROCEDURE — 84100 ASSAY OF PHOSPHORUS: CPT

## 2024-09-26 PROCEDURE — 84153 ASSAY OF PSA TOTAL: CPT

## 2024-09-26 PROCEDURE — 80053 COMPREHEN METABOLIC PANEL: CPT

## 2024-09-26 PROCEDURE — 83970 ASSAY OF PARATHORMONE: CPT

## 2024-09-26 PROCEDURE — 80061 LIPID PANEL: CPT

## 2024-09-26 PROCEDURE — 84443 ASSAY THYROID STIM HORMONE: CPT

## 2024-09-26 RX ORDER — LEVOTHYROXINE SODIUM 25 UG/1
TABLET ORAL
Qty: 90 TABLET | Refills: 3 | Status: SHIPPED | OUTPATIENT
Start: 2024-09-26

## 2024-10-02 ENCOUNTER — OFFICE VISIT (OUTPATIENT)
Dept: UROLOGY | Facility: CLINIC | Age: 79
End: 2024-10-02
Payer: MEDICARE

## 2024-10-02 VITALS
SYSTOLIC BLOOD PRESSURE: 127 MMHG | HEIGHT: 70 IN | BODY MASS INDEX: 27.49 KG/M2 | TEMPERATURE: 98.4 F | DIASTOLIC BLOOD PRESSURE: 53 MMHG | WEIGHT: 192 LBS | HEART RATE: 63 BPM

## 2024-10-02 DIAGNOSIS — N13.8 PROSTATIC NODULE WITH OBSTRUCTION: ICD-10-CM

## 2024-10-02 DIAGNOSIS — R97.20 ELEVATED PROSTATE SPECIFIC ANTIGEN (PSA): ICD-10-CM

## 2024-10-02 DIAGNOSIS — N40.3 PROSTATIC NODULE WITH OBSTRUCTION: ICD-10-CM

## 2024-10-02 DIAGNOSIS — N52.01 ERECTILE DYSFUNCTION DUE TO ARTERIAL INSUFFICIENCY: ICD-10-CM

## 2024-10-02 DIAGNOSIS — N48.6 PEYRONIE'S DISEASE: Primary | ICD-10-CM

## 2024-10-02 LAB
BACTERIA UR QL AUTO: NORMAL /HPF
BILIRUB BLD-MCNC: NEGATIVE MG/DL
CLARITY, POC: CLEAR
COLOR UR: YELLOW
EPI CELLS #/AREA URNS HPF: 0 /[HPF]
EXPIRATION DATE: ABNORMAL
GLUCOSE UR STRIP-MCNC: NEGATIVE MG/DL
KETONES UR QL: NEGATIVE
LEUKOCYTE EST, POC: ABNORMAL
Lab: ABNORMAL
NITRITE UR-MCNC: NEGATIVE MG/ML
PH UR: 5.5 [PH] (ref 5–8)
PROT UR STRIP-MCNC: NEGATIVE MG/DL
RBC # UR STRIP: NEGATIVE /UL
RBC # UR STRIP: NORMAL /HPF
RENAL EPITHELIAL, POC: 0
SP GR UR: 1 (ref 1–1.03)
UNIDENT CRYS URNS QL MICRO: NORMAL /HPF
UROBILINOGEN UR QL: ABNORMAL
WBC # UR STRIP: NORMAL /HPF

## 2024-10-02 RX ORDER — MULTIPLE VITAMINS W/ MINERALS TAB 9MG-400MCG
1 TAB ORAL DAILY
COMMUNITY

## 2024-10-02 RX ORDER — TAMSULOSIN HYDROCHLORIDE 0.4 MG/1
1 CAPSULE ORAL DAILY
Qty: 90 CAPSULE | Refills: 1 | Status: SHIPPED | OUTPATIENT
Start: 2024-10-02 | End: 2025-03-31

## 2024-10-02 RX ORDER — LOSARTAN POTASSIUM 50 MG/1
50 TABLET ORAL DAILY
COMMUNITY

## 2024-10-02 RX ORDER — TADALAFIL 5 MG/1
5 TABLET ORAL DAILY PRN
Qty: 90 TABLET | Refills: 1 | Status: SHIPPED | OUTPATIENT
Start: 2024-10-02 | End: 2025-03-31

## 2024-10-02 RX ORDER — MIRTAZAPINE 15 MG/1
15 TABLET, FILM COATED ORAL DAILY
COMMUNITY
Start: 2024-08-19 | End: 2024-10-02 | Stop reason: SDUPTHER

## 2024-10-02 NOTE — PROGRESS NOTES
"Chief Complaint  Abnormal Penile Curvature (6mo. F/u)    Subjective          Reji Mcmillan presents to [facility]  History of Present Illness    79-year-old white male is here for evaluation of his prostate gland and elevated PSA.  PSA on 9/26/2024 was 4.760.  Patient continues to have nocturia along with urgency.  Has been frequency but he is emptying his urinary bladder.  His AUA score is 11/35.  Patient has no dysuria or gross hematuria.  AUA score is 1.    There is no family history of prostate cancer.  Patient continues to have slight bend in the penis from Peyronie's disease.    He is not getting very good erections.  Wife has Alzheimer so he cannot have sexual intercourse anyway.  He does not want any outside relationship.    Objective no acute distress  Vital Signs:   /53 (BP Location: Left arm, Patient Position: Sitting, Cuff Size: Adult)   Pulse 63   Temp 98.4 °F (36.9 °C) (Temporal)   Ht 177.8 cm (70\")   Wt 87.1 kg (192 lb)   BMI 27.55 kg/m²     Allergies   Allergen Reactions    Sulfa Antibiotics Unknown - Low Severity     Flu like symptoms caused from taking medication      Past medical history:  has a past medical history of Allergic rhinitis due to allergen, Benign prostatic hyperplasia, CAD s/p CABG (03/30/2022), Chest pain, Chronic allergic rhinitis, CKD (chronic kidney disease) stage 3, GFR 30-59 ml/min, Eczema, Erectile dysfunction (10/19/2020), GERD (gastroesophageal reflux disease), Heart disease, High blood pressure, High cholesterol, Hypercholesteremia, Hypertension, Hypothyroidism, Orthostatic hypotension, Polio, and Prostate disorder.   Past surgical history:  has a past surgical history that includes Cardiac surgery; Cholecystectomy; Colonoscopy; Coronary artery bypass graft; Hernia repair; Dental surgery; Toe Surgery (Left); and Colonoscopy (N/A, 3/20/2023).  Personal history: family history includes Anxiety disorder in his mother and sister; " Dementia in his mother; Depression in his sister; Heart attack in his father; Heart disease in his mother; Hyperlipidemia in his father and mother; Hypertension in his father and mother; No Known Problems in his brother, cousin, maternal aunt, maternal grandfather, maternal grandmother, maternal uncle, paternal aunt, paternal grandfather, paternal grandmother, paternal uncle, and another family member.  Social history:  reports that he has never smoked. He has never been exposed to tobacco smoke. He has never used smokeless tobacco. He reports current alcohol use. He reports that he does not use drugs.    Review of Systems    Please see past medical and surgical history    Physical Exam  Constitutional:       General: He is not in acute distress.     Appearance: Normal appearance. He is normal weight. He is not ill-appearing or toxic-appearing.   HENT:      Head: Normocephalic and atraumatic.      Ears:      Comments: No significant loss of hearing  Abdominal:      Palpations: Abdomen is soft. There is no mass.      Tenderness: There is no abdominal tenderness. There is no right CVA tenderness or left CVA tenderness.   Genitourinary:     Comments: Penis is circumcised.    Peyronie's disease is improved but I can still feel plaques in the penis extending all the way to corona.    Right and left scrotum is normal.    Right and left testicle and epididymis is normal.    FLY.  Prostate gland is almost 100 g and  nodular.  Musculoskeletal:         General: Normal range of motion.      Cervical back: Normal range of motion and neck supple. No rigidity or tenderness.   Lymphadenopathy:      Cervical: No cervical adenopathy.   Skin:     General: Skin is warm.      Coloration: Skin is not jaundiced.   Neurological:      General: No focal deficit present.      Mental Status: He is alert and oriented to person, place, and time.      Motor: No weakness.      Gait: Gait normal.   Psychiatric:         Mood and Affect: Mood  normal.         Behavior: Behavior normal.         Thought Content: Thought content normal.         Judgment: Judgment normal.        Result Review :                 Assessment and Plan    Diagnoses and all orders for this visit:    1. Peyronie's disease (Primary)  -     POC Urinalysis Dipstick, Automated  -     tadalafil (CIALIS) 5 MG tablet; Take 1 tablet by mouth Daily As Needed for Erectile Dysfunction for up to 180 days.  Dispense: 90 tablet; Refill: 1    2. Elevated prostate specific antigen (PSA)  -     MRI Prostate With & Without Contrast; Future  -     POC Urine Microscopic Only    3. Prostatic nodule with obstruction  -     tadalafil (CIALIS) 5 MG tablet; Take 1 tablet by mouth Daily As Needed for Erectile Dysfunction for up to 180 days.  Dispense: 90 tablet; Refill: 1  -     tamsulosin (FLOMAX) 0.4 MG capsule 24 hr capsule; Take 1 capsule by mouth Daily for 180 days.  Dispense: 90 capsule; Refill: 1  -     MRI Prostate With & Without Contrast; Future  -     POC Urine Microscopic Only    4. Erectile dysfunction due to arterial insufficiency  -     tadalafil (CIALIS) 5 MG tablet; Take 1 tablet by mouth Daily As Needed for Erectile Dysfunction for up to 180 days.  Dispense: 90 tablet; Refill: 1         Brief Urine Lab Results  (Last result in the past 365 days)        Color   Clarity   Blood   Leuk Est   Nitrite   Protein   CREAT   Urine HCG        10/02/24 0951 Yellow   Clear   Negative   Trace   Negative   Negative                    Follow Up   No follow-ups on file.  Patient was given instructions and counseling regarding his condition or for health maintenance advice. Please see specific information pulled into the AVS if appropriate.     Mellisa Montes MD

## 2024-10-30 ENCOUNTER — HOSPITAL ENCOUNTER (OUTPATIENT)
Facility: HOSPITAL | Age: 79
Discharge: HOME | End: 2024-10-30
Admitting: UROLOGY
Payer: MEDICARE

## 2024-10-30 DIAGNOSIS — R97.20 ELEVATED PROSTATE SPECIFIC ANTIGEN (PSA): ICD-10-CM

## 2024-10-30 DIAGNOSIS — N40.3 PROSTATIC NODULE WITH OBSTRUCTION: ICD-10-CM

## 2024-10-30 DIAGNOSIS — N13.8 PROSTATIC NODULE WITH OBSTRUCTION: ICD-10-CM

## 2024-10-30 PROCEDURE — A9577 INJ MULTIHANCE: HCPCS | Performed by: UROLOGY

## 2024-10-30 PROCEDURE — 0 GADOBENATE DIMEGLUMINE 529 MG/ML SOLUTION: Performed by: UROLOGY

## 2024-10-30 PROCEDURE — 72197 MRI PELVIS W/O & W/DYE: CPT

## 2024-10-30 RX ADMIN — GADOBENATE DIMEGLUMINE 20 ML: 529 INJECTION, SOLUTION INTRAVENOUS at 10:59

## 2024-11-01 ENCOUNTER — OFFICE VISIT (OUTPATIENT)
Dept: PSYCHIATRY | Facility: CLINIC | Age: 79
End: 2024-11-01
Payer: MEDICARE

## 2024-11-01 VITALS
OXYGEN SATURATION: 97 % | SYSTOLIC BLOOD PRESSURE: 131 MMHG | DIASTOLIC BLOOD PRESSURE: 59 MMHG | HEIGHT: 70 IN | WEIGHT: 192 LBS | BODY MASS INDEX: 27.49 KG/M2 | HEART RATE: 68 BPM

## 2024-11-01 DIAGNOSIS — F33.1 MAJOR DEPRESSIVE DISORDER, RECURRENT EPISODE, MODERATE: Primary | ICD-10-CM

## 2024-11-01 DIAGNOSIS — F51.05 INSOMNIA DUE TO MENTAL CONDITION: ICD-10-CM

## 2024-11-01 DIAGNOSIS — F41.1 GENERALIZED ANXIETY DISORDER: ICD-10-CM

## 2024-11-01 RX ORDER — ATORVASTATIN CALCIUM 80 MG/1
80 TABLET, FILM COATED ORAL DAILY
Qty: 90 TABLET | Refills: 3 | Status: SHIPPED | OUTPATIENT
Start: 2024-11-01

## 2024-11-01 RX ORDER — FLUOXETINE 10 MG/1
10 CAPSULE ORAL DAILY
Qty: 90 CAPSULE | Refills: 1 | Status: SHIPPED | OUTPATIENT
Start: 2024-11-01

## 2024-11-01 NOTE — PATIENT INSTRUCTIONS
1.  Please return to clinic at your next scheduled visit.  Contact the clinic ([phone]) at least 24 hours prior in the event you need to cancel.  2.  Do no harm to yourself or others.    3.  Avoid alcohol and drugs.    4.  Take all medications as prescribed.  Please contact the clinic with any concerns. If you are in need of medication refills, please call the clinic at [phone].    5. Should you want to get in touch with your provider, Dr. Teddy Montes, please utilize [employer] or contact the office ([phone]), and staff will be able to page Dr. Montes directly.  6.  In the event you have personal crisis, contact the following crisis numbers: Suicide Prevention Hotline 1-649.713.7863; [facility] Helpline 0-385-251-EDWARD; [facility] [phone]; text HELLO to 703166; or 606.

## 2024-11-01 NOTE — PROGRESS NOTES
"Subjective   Reji Mcmillan is a 79 y.o. male who presents today for initial evaluation     Referring Provider:  No referring provider defined for this encounter.    Chief Complaint:  depression    History of Present Illness:     2024: INITIAL VISIT Chart review:     [place]wey: Ambien CR 12.5 mg nightly chronically  Care Everywhere: Several non behavioral health notes pertaining to chronic kidney disease    Psychotropic medication chart review:  Present:  Ambien CR 12.5 mg at night    Previously:  Paxil 10 mg a day    EKG: 2023: 55, sinus, QTc 419.  Procedures: none  Head imaging: none  Labs: 2024: Creatinine 1.5, glucose 103, calcium 7.6, otherwise reassuring CMP.  Reassuring A1c, thyroid studies, lipids, vitamin D, CBC.  Total saturation  Iron profile is slightly low at 18.  UDS entirely negative in 2023.  Initial Chart Review Notes: Seen by primary care in April.  PHQ-9 is a 4.  Seen as a follow-up for anxiety, insomnia.  Sleeping well on Ambien.  Having some fatigue.  Referred to us for anxiety and depression.      Chart Review By Dates:  2024: nephro, urol, abnl UA; high PSA; reassuring thyroid studies; abnl cmp cr 1.54 gluc 106.  2024: no visits.  2024: cr 1.60, eGFR 44. Doubled mirtazapine dose.  7/15/24: no visits.    Plannin2024: Improved, possibly stable.   2024: Improving, increase mirtazapine. Thinking about getting help with his wife at home.  07/15/2024: Improving MDD, KIMBERLY. Wants to trial another medication besides ambien (vivid dreams, hangover after). Start mirtazapine.  : Slow wean off paxil (has been on for years), start prozac (safe in kidney dz). Wife is significant stressor. 4w    VISITS/APPOINTMENTS (BELOW):    \"Reji\"   Stage IIIb chronic kidney disease  Hx open heart surgery, 2x bypass. .      2024:   In person interview:  \"It's been a busy week.\"  Discussed his wife  \"I've been ok\"  Looking into getting help to take " "care of his wife, ongoing. Has had a SW look into it, they don't qualify for medicare (make too much money).  MDD: possibly stable  KIMBERLY: stable, but worried about his wife.  Panic attacks: stable  Energy: stable  Concentration: stable  Insomnia: stable  Eating/Weight: 192, 190, 193, 184  Refills: yes  Substances: denies  Therapy: none  Medication compliant: yes  SE: none  No SI HI AVH.       09/23/2024:   In person interview:  \"I think I'm improving... it's my wife.\"  I'm learning not to be angry with her  Discussed wife  I've been looking into getting help  MDD: possibly stable  KIMBERLY: possibly stable  Panic attacks: stable  Energy: stable  Concentration: stable  Insomnia: stable  Eating/Weight: 190, 193, 184  Refills: yes  Substances: denies  Therapy: none  Medication compliant: yes  SE: none  No SI HI AVH.       08/19/2024:   In person interview:  \"I'm still dealing with my wife's situation.\"  Discussed anger  Takes all day to get her out of bed  She doesn't talk like she used to  Taking three mirtazapine helped with insomnia, took 30 minutes to sleep  MDD: Improving mood  KIMBERLY: improving  Panic attacks: stable  Energy: improving  Concentration: improving  Insomnia: initial, improving  Eating/Weight: 193, 184  Refills: y  Substances: denies  Therapy: none  Medication compliant: y  SE: n  No SI HI AVH.       07/15/2024: In person interview:  \"Well, I didn't know what to expect.\"  One of my friends noticed I'm a little more talkative.  Not dreading to do stuff.  I'd like something else for sleep. I don't want to be on narcotics.  MDD: Improving mood  KIMBERLY: improving  Panic attacks: n  Energy: improving  Concentration: improving  Insomnia: stable on ambien, but vivid dreams  Eating/Weight: 184  Refills: y  Substances: denies  Therapy: n  Medication compliant: y  SE: n  No SI HI AVH.      06/12/2024: In person.  Interview:  His/Her Story: \"Ambien helps, but it doesn't hold me down.\"  P14, G7  Racing thoughts at " "night  Friends have said I've changed, I'm more \"low\" and not as enthusiastic as I used to be  Has been on paxil for more than 3 years   54 years, wife has dementia, additional stressor  She's losing function  Hx open heart surgery, 2x bypass.  Depression/Mood:  Depressed mood, anhedonia,   poor energy, poor concentration,   insomnia.  Seasonal pattern: def  Severity: Moderate  Duration: Many years  Anxiety:  Uncontrolled worrying, muscle tension, fatigue, poor concentration, feeling on edge  irritability, insomnia.  Severity: Moderate  Duration: Many years  Panic attacks: n  Psych ROS: Positive for depression, anxiety.  Negative for psychosis and shweta.  ADHD: def  PTSD: def  No SI HI AVH.  Medication compliant: y    Access to Firearms:  yes, locked away    PHQ-9 Depression Screening  PHQ-9 Total Score:      Little interest or pleasure in doing things?     Feeling down, depressed, or hopeless?     Trouble falling or staying asleep, or sleeping too much?     Feeling tired or having little energy?     Poor appetite or overeating?     Feeling bad about yourself - or that you are a failure or have let yourself or your family down?     Trouble concentrating on things, such as reading the newspaper or watching television?     Moving or speaking so slowly that other people could have noticed? Or the opposite - being so fidgety or restless that you have been moving around a lot more than usual?     Thoughts that you would be better off dead, or of hurting yourself in some way?     PHQ-9 Total Score       KIMBERLY-7       Past Surgical History:  Past Surgical History:   Procedure Laterality Date    CARDIAC SURGERY      CHOLECYSTECTOMY      COLONOSCOPY      COLONOSCOPY N/A 3/20/2023    Procedure: COLONOSCOPY;  Surgeon: Jacques Dc MD;  Location: [facility] ENDOSCOPY;  Service: General;  Laterality: N/A;  diverticulosis, hemorrhoids    CORONARY ARTERY BYPASS GRAFT      cabbage x2    DENTAL PROCEDURE      HERNIA REPAIR   " "   TOE SURGERY Left        Problem List:  Patient Active Problem List   Diagnosis    Essential hypertension    Mixed hyperlipidemia    Hypothyroidism    Vitamin D deficiency    Anxiety and depression    Insomnia    Anemia    Chronic low back pain    Nodular hyperplasia of prostate gland    Elevated prostate specific antigen (PSA)    Disorder of arteries and arterioles, unspecified    Stage 3 chronic kidney disease    CAD s/p CABG    Peyronie disease    Vasculogenic erectile dysfunction    History of elevated PSA    Screening for malignant neoplasm of colon    History of colonic polyps    Other male erectile dysfunction       Allergy:   Allergies   Allergen Reactions    Sulfa Antibiotics Unknown - Low Severity     Flu like symptoms caused from taking medication        Discontinued Medications:  Medications Discontinued During This Encounter   Medication Reason    FLUoxetine (PROzac) 10 MG capsule Reorder             Current Medications:   Current Outpatient Medications   Medication Sig Dispense Refill    FLUoxetine (PROzac) 10 MG capsule Take 1 capsule by mouth Daily. 90 capsule 1    amLODIPine (NORVASC) 5 MG tablet TAKE 1 TABLET DAILY 90 tablet 3    aspirin 81 MG EC tablet Take 1 tablet by mouth Daily.      atorvastatin (LIPITOR) 80 MG tablet TAKE 1 TABLET DAILY 90 tablet 3    B-D TB SYRINGE 1CC/27GX1/2\" 27G X 1/2\" 1 ML misc USE FOR XIAFLEX INJECTION 4 each 3    cetirizine (zyrTEC) 10 MG tablet Take 1 tablet by mouth Daily.      Dupilumab (Dupixent) 300 MG/2ML solution pen-injector Inject 1 mL under the skin into the appropriate area as directed Every 14 (Fourteen) Days.      ketoconazole (NIZORAL) 2 % cream Apply 1 application  topically to the appropriate area as directed Daily. 30 g 1    lansoprazole (PREVACID) 30 MG capsule TAKE 1 CAPSULE DAILY 90 capsule 3    levothyroxine (SYNTHROID, LEVOTHROID) 25 MCG tablet TAKE 1 TABLET DAILY 90 tablet 3    losartan (COZAAR) 50 MG tablet Take 1 tablet by mouth Daily.      " mirtazapine (REMERON) 15 MG tablet Take 1 tablet by mouth Every Night. (Patient taking differently: Take 1 tablet by mouth Every Night. 1.5 tab) 135 tablet 1    multivitamin with minerals tablet tablet Take 1 tablet by mouth Daily.      ranolazine (RANEXA) 500 MG 12 hr tablet TAKE 1 TABLET TWICE A  tablet 3    tadalafil (CIALIS) 5 MG tablet Take 1 tablet by mouth Daily As Needed for Erectile Dysfunction for up to 180 days. 90 tablet 1    tamsulosin (FLOMAX) 0.4 MG capsule 24 hr capsule Take 1 capsule by mouth Daily for 180 days. 90 capsule 1     No current facility-administered medications for this visit.       Past Medical History:  Past Medical History:   Diagnosis Date    Allergic rhinitis due to allergen     Benign prostatic hyperplasia     CAD s/p CABG 03/30/2022    Chest pain     Chronic allergic rhinitis     CKD (chronic kidney disease) stage 3, GFR 30-59 ml/min     Eczema     Erectile dysfunction 10/19/2020    GERD (gastroesophageal reflux disease)     Heart disease     High blood pressure     High cholesterol     Hypercholesteremia     Hypertension     Controlled    Hypothyroidism     Orthostatic hypotension     Polio     Prostate disorder        Past Psychiatric History:  Began Treatment: PCP for years  Diagnoses: MDD  Psychiatrist:Denies  Therapist:Denies  Admission History:Denies  Medication Trials:    Paxil only, used to work    Self Harm: Denies  Suicide Attempts:Denies      Substance Abuse History:   Types: 1 airplane bottle 4x/wk  Withdrawal Symptoms:Denies  Longest Period Sober:Not Applicable   AA: Not applicable     Social History:  Martial Status:  Employed:No. Taught sones for 44 years, industrial maintenance  Kids:Yes  House:Lives in a house   History: Denies    Social History     Socioeconomic History    Marital status:    Tobacco Use    Smoking status: Never     Passive exposure: Never    Smokeless tobacco: Never   Vaping Use    Vaping status: Never Used  "  Substance and Sexual Activity    Alcohol use: Yes     Comment: 2-3 times weekly due to stress.    Drug use: Never    Sexual activity: Defer       Family History:   Suicide Attempts: Denies  Suicide Completions:Denies      Family History   Problem Relation Age of Onset    Dementia Mother     Anxiety disorder Mother     Hyperlipidemia Mother     Heart disease Mother     Hypertension Mother     Hyperlipidemia Father     Hypertension Father     Heart attack Father     Depression Sister     Anxiety disorder Sister     No Known Problems Brother     No Known Problems Maternal Aunt     No Known Problems Paternal Aunt     No Known Problems Maternal Uncle     No Known Problems Paternal Uncle     No Known Problems Maternal Grandfather     No Known Problems Maternal Grandmother     No Known Problems Paternal Grandfather     No Known Problems Paternal Grandmother     No Known Problems Cousin     No Known Problems Other     Malig Hyperthermia Neg Hx     ADD / ADHD Neg Hx     Alcohol abuse Neg Hx     Bipolar disorder Neg Hx     Drug abuse Neg Hx     OCD Neg Hx     Paranoid behavior Neg Hx     Schizophrenia Neg Hx     Seizures Neg Hx     Self-Injurious Behavior  Neg Hx     Suicide Attempts Neg Hx        Developmental History:       Childhood: Denies Abuse  High School:Completed  College: 4 years, education degree    Mental Status Exam  Appearance  : groomed, good eye contact, normal street clothes  Behavior  : pleasant and cooperative  Motor  : No abnormal  Speech  :normal rhythm, rate, volume, tone, not hyperverbal, not pressured, normal prosidy  Mood  : \"I've been ok\"  Affect  : euthymic, mood congruent, good variability  Thought Content  : negative suicidal ideations, negative homicidal ideations, negative obsessions  Perceptions  : negative auditory hallucinations, negative visual hallucinations  Thought Process  : linear  Insight/Judgement  : Fair/fair  Cognition  : grossly intact  Attention   : intact      Review of " "Systems:  Review of Systems   Constitutional:  Positive for activity change, fatigue and unexpected weight change.   Neurological:  Negative for headaches.   Psychiatric/Behavioral:  Positive for agitation and sleep disturbance.        Physical Exam:  Physical Exam    Vital Signs:   /59   Pulse 68   Ht 177.8 cm (70\")   Wt 87.1 kg (192 lb)   SpO2 97%   BMI 27.55 kg/m²      Lab Results:   Office Visit on 10/02/2024   Component Date Value Ref Range Status    Color 10/02/2024 Yellow  Yellow, Straw, Dark Yellow, Juliet Final    Clarity, UA 10/02/2024 Clear  Clear Final    Specific Gravity  10/02/2024 1.005  1.005 - 1.030 Final    pH, Urine 10/02/2024 5.5  5.0 - 8.0 Final    Leukocytes 10/02/2024 Trace (A)  Negative Final    Nitrite, UA 10/02/2024 Negative  Negative Final    Protein, POC 10/02/2024 Negative  Negative mg/dL Final    Glucose, UA 10/02/2024 Negative  Negative mg/dL Final    Ketones, UA 10/02/2024 Negative  Negative Final    Urobilinogen, UA 10/02/2024 0.2 E.U./dL  Normal, 0.2 E.U./dL Final    Bilirubin 10/02/2024 Negative  Negative Final    Blood, UA 10/02/2024 Negative  Negative Final    Lot Number 10/02/2024 [id number]   Final    Expiration Date 10/02/2024 07/31/25   Final    RBC, UA 10/02/2024 None Seen  None Seen, 0-2 /HPF Final    WBC, UA 10/02/2024 None Seen  None Seen, 0-2 /HPF Final    Bacteria, UA 10/02/2024 None Seen  None Seen /HPF Final    Crystals, UA 10/02/2024 None Seen  /HPF Final    Epithelial Cells (non renal) 10/02/2024 0   Final    Renal Epithel, UA 10/02/2024 0   Final   Lab on 09/26/2024   Component Date Value Ref Range Status    PSA 09/26/2024 4.760 (H)  0.000 - 4.000 ng/mL Final    Glucose 09/26/2024 106 (H)  65 - 99 mg/dL Final    BUN 09/26/2024 17  8 - 23 mg/dL Final    Creatinine 09/26/2024 1.54 (H)  0.76 - 1.27 mg/dL Final    Sodium 09/26/2024 140  136 - 145 mmol/L Final    Potassium 09/26/2024 4.2  3.5 - 5.2 mmol/L Final    Chloride 09/26/2024 106  98 - 107 mmol/L Final    " CO2 09/26/2024 23.1  22.0 - 29.0 mmol/L Final    Calcium 09/26/2024 9.1  8.6 - 10.5 mg/dL Final    Total Protein 09/26/2024 7.2  6.0 - 8.5 g/dL Final    Albumin 09/26/2024 4.2  3.5 - 5.2 g/dL Final    ALT (SGPT) 09/26/2024 15  1 - 41 U/L Final    AST (SGOT) 09/26/2024 20  1 - 40 U/L Final    Alkaline Phosphatase 09/26/2024 64  39 - 117 U/L Final    Total Bilirubin 09/26/2024 0.4  0.0 - 1.2 mg/dL Final    Globulin 09/26/2024 3.0  gm/dL Final    A/G Ratio 09/26/2024 1.4  g/dL Final    BUN/Creatinine Ratio 09/26/2024 11.0  7.0 - 25.0 Final    Anion Gap 09/26/2024 10.9  5.0 - 15.0 mmol/L Final    eGFR 09/26/2024 45.9 (L)  >60.0 mL/min/1.73 Final    TSH 09/26/2024 2.720  0.270 - 4.200 uIU/mL Final    Total Cholesterol 09/26/2024 137  0 - 200 mg/dL Final    Triglycerides 09/26/2024 88  0 - 150 mg/dL Final    HDL Cholesterol 09/26/2024 55  40 - 60 mg/dL Final    LDL Cholesterol  09/26/2024 65  0 - 100 mg/dL Final    VLDL Cholesterol 09/26/2024 17  5 - 40 mg/dL Final    LDL/HDL Ratio 09/26/2024 1.17   Final    Vitamin B-12 09/26/2024 461  211 - 946 pg/mL Final    Folate 09/26/2024 >20.00  4.78 - 24.20 ng/mL Final    Iron 09/26/2024 40 (L)  59 - 158 mcg/dL Final    Magnesium 09/26/2024 1.9  1.6 - 2.4 mg/dL Final    Free T4 09/26/2024 1.18  0.92 - 1.68 ng/dL Final    25 Hydroxy, Vitamin D 09/26/2024 33.3  30.0 - 100.0 ng/ml Final    Hemoglobin A1C 09/26/2024 5.50  4.80 - 5.60 % Final    PTH, Intact 09/26/2024 34.2  15.0 - 65.0 pg/mL Final    WBC 09/26/2024 7.02  3.40 - 10.80 10*3/mm3 Final    RBC 09/26/2024 4.29  4.14 - 5.80 10*6/mm3 Final    Hemoglobin 09/26/2024 12.3 (L)  13.0 - 17.7 g/dL Final    Hematocrit 09/26/2024 36.8 (L)  37.5 - 51.0 % Final    MCV 09/26/2024 85.8  79.0 - 97.0 fL Final    MCH 09/26/2024 28.7  26.6 - 33.0 pg Final    MCHC 09/26/2024 33.4  31.5 - 35.7 g/dL Final    RDW 09/26/2024 13.6  12.3 - 15.4 % Final    RDW-SD 09/26/2024 42.6  37.0 - 54.0 fl Final    MPV 09/26/2024 10.7  6.0 - 12.0 fL Final     Platelets 09/26/2024 288  140 - 450 10*3/mm3 Final    Neutrophil % 09/26/2024 75.9  42.7 - 76.0 % Final    Lymphocyte % 09/26/2024 13.1 (L)  19.6 - 45.3 % Final    Monocyte % 09/26/2024 9.1  5.0 - 12.0 % Final    Eosinophil % 09/26/2024 1.1  0.3 - 6.2 % Final    Basophil % 09/26/2024 0.1  0.0 - 1.5 % Final    Immature Grans % 09/26/2024 0.7 (H)  0.0 - 0.5 % Final    Neutrophils, Absolute 09/26/2024 5.32  1.70 - 7.00 10*3/mm3 Final    Lymphocytes, Absolute 09/26/2024 0.92  0.70 - 3.10 10*3/mm3 Final    Monocytes, Absolute 09/26/2024 0.64  0.10 - 0.90 10*3/mm3 Final    Eosinophils, Absolute 09/26/2024 0.08  0.00 - 0.40 10*3/mm3 Final    Basophils, Absolute 09/26/2024 0.01  0.00 - 0.20 10*3/mm3 Final    Immature Grans, Absolute 09/26/2024 0.05  0.00 - 0.05 10*3/mm3 Final    nRBC 09/26/2024 0.0  0.0 - 0.2 /100 WBC Final    Phosphorus 09/26/2024 2.4 (L)  2.5 - 4.5 mg/dL Final    Creatinine, Urine 09/26/2024 221.2  mg/dL Final    Total Protein, Urine 09/26/2024 25.1  mg/dL Final    Protein/Creatinine Ratio, Urine 09/26/2024 0.11   Final    Color, UA 09/26/2024 Dark Yellow (A)  Yellow, Straw Final    Appearance, UA 09/26/2024 Turbid (A)  Clear Final    pH, UA 09/26/2024 5.5  5.0 - 8.0 Final    Specific Gravity, UA 09/26/2024 1.024  1.005 - 1.030 Final    Glucose, UA 09/26/2024 Negative  Negative Final    Ketones, UA 09/26/2024 Trace (A)  Negative Final    Bilirubin, UA 09/26/2024 Negative  Negative Final    Blood, UA 09/26/2024 Negative  Negative Final    Protein, UA 09/26/2024 30 mg/dL (1+) (A)  Negative Final    Leuk Esterase, UA 09/26/2024 Trace (A)  Negative Final    Nitrite, UA 09/26/2024 Negative  Negative Final    Urobilinogen, UA 09/26/2024 1.0 E.U./dL  0.2 - 1.0 E.U./dL Final    RBC, UA 09/26/2024 0-2  None Seen, 0-2 /HPF Final    WBC, UA 09/26/2024 0-2  None Seen, 0-2 /HPF Final    Bacteria, UA 09/26/2024 None Seen  None Seen /HPF Final    Squamous Epithelial Cells, UA 09/26/2024 0-2  None Seen, 0-2 /HPF Final     Hyaline Casts, UA 09/26/2024 0-2  None Seen /LPF Final    Methodology 09/26/2024 Automated Microscopy   Final   Hospital Outpatient Visit on 08/01/2024   Component Date Value Ref Range Status    Creatinine 08/01/2024 1.60 (H)  0.60 - 1.30 mg/dL Final    Serial Number: [id number]:  [id number]    eGFR 08/01/2024 44.1 (L)  >60.0 mL/min/1.73 Final   Hospital Outpatient Visit on 06/07/2024   Component Date Value Ref Range Status    Right arm BP 06/07/2024 134/71  mmHg Final    Left arm BP 06/07/2024 138/71  mmHg Final    Prox CCA PSV 06/07/2024 89.4  cm/sec Final    Prox CCA EDV 06/07/2024 11.1  cm/sec Final    Dist CCA PSV 06/07/2024 101.8  cm/sec Final    Dist CCA EDV 06/07/2024 17.4  cm/sec Final    Prox ICA PSV 06/07/2024 80.1  cm/sec Final    Prox ICA EDV 06/07/2024 16.1  cm/sec Final    Mid ICA PSV 06/07/2024 90.0  cm/sec Final    Mid ICA EDV 06/07/2024 19.3  cm/sec Final    Dist ICA PSV 06/07/2024 78.9  cm/sec Final    Dist ICA EDV 06/07/2024 18.6  cm/sec Final    Prox ECA PSV 06/07/2024 113.0  cm/sec Final    Prox ECA EDV 06/07/2024 9.3  cm/sec Final    Vertebral A PSV 06/07/2024 35.0  cm/sec Final    Vertebral A EDV 06/07/2024 9.5  cm/sec Final    Prox CCA PSV 06/07/2024 91.8  cm/sec Final    Prox CCA EDV 06/07/2024 11.6  cm/sec Final    Dist CCA PSV 06/07/2024 87.6  cm/sec Final    Dist CCA EDV 06/07/2024 11.0  cm/sec Final    Prox ICA PSV 06/07/2024 77.2  cm/sec Final    Prox ICA EDV 06/07/2024 13.0  cm/sec Final    Mid ICA PSV 06/07/2024 77.9  cm/sec Final    Mid ICA EDV 06/07/2024 18.8  cm/sec Final    Dist ICA PSV 06/07/2024 88.3  cm/sec Final    Dist ICA EDV 06/07/2024 15.6  cm/sec Final    Prox ECA PSV 06/07/2024 92.2  cm/sec Final    Prox ECA EDV 06/07/2024 9.1  cm/sec Final    Vertebral A PSV 06/07/2024 38.3  cm/sec Final    Vertebral A EDV 06/07/2024 10.4  cm/sec Final    XLRA MERRY RIGHT CAROTID BULB PSV 06/07/2024 97.5  cm/sec Final    XLRA MERRY RIGHT CAROTID BULB EDV 06/07/2024 15.5  cm/sec Final     XLRA MERRY LEFT CAROTID BULB PSV 06/07/2024 63.0  cm/sec Final    XLRA MERRY LEFT CAROTID BULB EDV 06/07/2024 9.7  cm/sec Final    ICA/CCA ratio 06/07/2024 0.80   Final    ICA/CCA ratio 06/07/2024 0.90   Final       EKG Results:  No orders to display       Imaging Results:  No Images in the past 120 days found..      Assessment & Plan   Diagnoses and all orders for this visit:    1. Major depressive disorder, recurrent episode, moderate (Primary)  -     FLUoxetine (PROzac) 10 MG capsule; Take 1 capsule by mouth Daily.  Dispense: 90 capsule; Refill: 1    2. Generalized anxiety disorder  -     FLUoxetine (PROzac) 10 MG capsule; Take 1 capsule by mouth Daily.  Dispense: 90 capsule; Refill: 1    3. Insomnia due to mental condition  -     FLUoxetine (PROzac) 10 MG capsule; Take 1 capsule by mouth Daily.  Dispense: 90 capsule; Refill: 1        Visit Diagnoses:    ICD-10-CM ICD-9-CM   1. Major depressive disorder, recurrent episode, moderate  F33.1 296.32   2. Generalized anxiety disorder  F41.1 300.02   3. Insomnia due to mental condition  F51.05 300.9     327.02     11/01/2024: Stable, well, no med changes. Plans to do more reading before bed (30 min before).     Allowed patient to freely discuss and process issues, such as:  Anxiety and depression regarding family conflict/relationships.  Anxiety and depression regarding family's well-being, wife  Anxiety regarding the election coming up.  ... using Rogerian psychotherapeutic techniques including unconditional positive regard, reflective listening, and demonstrating clear empathy, with the goal of ameliorating symptoms and maintaining, restoring, or improving self-esteem, adaptive skills, and ego or psychological functions (Shea et al. 1991), the long-term goal of which is to develop a better, healthier perspective and help the patient bear their circumstances more easily.  Time (minutes) spent providing supportive psychotherapy: 16  (This time is exclusive to the  therapy session and separate from the time spent on activities used to meet the criteria for the E/M service (history, exam, medical decision-making).)  Start: 9:33  Stop: 9:49  Functional status: mild impairment  Treatment plan: Medication management and supportive psychotherapy  Prognosis: good  Progress: fairly stable  6w    09/23/2024: Improved, possibly stable.     08/19/2024: Improving, increase mirtazapine. Thinking about getting help with his wife at home.    07/15/2024: Improving MDD, KIMBERLY. Wants to trial another medication besides ambien (vivid dreams, hangover after). Start mirtazapine.    6/12: Slow wean off paxil (has been on for years), start prozac (safe in kidney dz). Wife is significant stressor. 4w    PLAN:  Safety: No acute safety concerns  Therapy: None  Risk Assessment: Risk of self-harm acutely is moderate.  Risk factors include anxiety disorder, mood disorder, access to firearms, and recent psychosocial stressors (pandemic). Protective factors include no family history, no present SI, no history of suicide attempts or self-harm in the past, minimal AODA, healthcare seeking, future orientation, willingness to engage in care.  Risk of self-harm chronically is also moderate, but could be further elevated in the event of treatment noncompliance and/or AODA.  Meds:  CONTINUE mirtazapine 22.5 mg qhs (max dose 22.5 mg given eGFR). Risks, benefits, alternatives discussed with patient including GI upset, sedation, dizziness with falls risk, increased appetite.  Do not use before operating vehicle, vessel, or machine. After discussion of these risks and benefits, the patient voiced understanding and agreed to proceed.  CONTINUE prozac 10 mg qam. Risks, benefits, alternatives discussed with patient including GI upset, nausea vomiting diarrhea, theoretical decrease of seizure threshold predisposing the patient to a slightly higher seizure risk, headaches, sexual dysfunction, serotonin syndrome, bleeding  risk, increased suicidality in patients 24 years and younger, switching to shweta/hypomania.  After discussion of these risks and benefits, the patient voiced understanding and agreed to proceed.  S/P:  paxil 10 mg qam. Ineffective 6/24  Labs: none    Patient screened positive for depression based on a PHQ-9 score of  on . Follow-up recommendations include: Prescribed antidepressant medication treatment and Suicide Risk Assessment performed.           TREATMENT PLAN/GOALS: Continue supportive psychotherapy efforts and medications as indicated. Treatment and medication options discussed during today's visit. Patient acknowledged and verbally consented to continue with current treatment plan and was educated on the importance of compliance with treatment and follow-up appointments.    MEDICATION ISSUES:  REENA reviewed as expected.  Discussed medication options and treatment plan of prescribed medication as well as the risks, benefits, and side effects including potential falls, possible impaired driving and metabolic adversities among others. Patient is agreeable to call the office with any worsening of symptoms or onset of side effects. Patient is agreeable to call 911 or go to the nearest ER should he/she begin having SI/HI. No medication side effects or related complaints today.     MEDS ORDERED DURING VISIT:  New Medications Ordered This Visit   Medications    FLUoxetine (PROzac) 10 MG capsule     Sig: Take 1 capsule by mouth Daily.     Dispense:  90 capsule     Refill:  1     Thank you for the help. Please call with questions: [phone].       Return in about 6 weeks (around 12/13/2024).         This document has been electronically signed by Teddy Montes MD  November 1, 2024 09:51 EDT    Dictated Utilizing Dragon Dictation: Part of this note may be an electronic transcription/translation of spoken language to printed text using the Dragon Dictation System.

## 2024-11-05 ENCOUNTER — OFFICE VISIT (OUTPATIENT)
Dept: UROLOGY | Facility: CLINIC | Age: 79
End: 2024-11-05
Payer: MEDICARE

## 2024-11-05 VITALS
DIASTOLIC BLOOD PRESSURE: 51 MMHG | BODY MASS INDEX: 27.57 KG/M2 | HEART RATE: 73 BPM | SYSTOLIC BLOOD PRESSURE: 125 MMHG | WEIGHT: 192.6 LBS | TEMPERATURE: 98.7 F | HEIGHT: 70 IN

## 2024-11-05 DIAGNOSIS — N48.6 PEYRONIE'S DISEASE: ICD-10-CM

## 2024-11-05 DIAGNOSIS — N40.3 PROSTATIC NODULE WITH OBSTRUCTION: ICD-10-CM

## 2024-11-05 DIAGNOSIS — N13.8 PROSTATIC NODULE WITH OBSTRUCTION: ICD-10-CM

## 2024-11-05 DIAGNOSIS — L90.0 LICHEN SCLEROSUS ET ATROPHICUS: ICD-10-CM

## 2024-11-05 DIAGNOSIS — R97.20 ELEVATED PROSTATE SPECIFIC ANTIGEN (PSA): ICD-10-CM

## 2024-11-05 DIAGNOSIS — N52.01 ERECTILE DYSFUNCTION DUE TO ARTERIAL INSUFFICIENCY: Primary | ICD-10-CM

## 2024-11-05 LAB
BILIRUB BLD-MCNC: NEGATIVE MG/DL
CLARITY, POC: CLEAR
COLOR UR: ABNORMAL
EXPIRATION DATE: ABNORMAL
GLUCOSE UR STRIP-MCNC: NEGATIVE MG/DL
KETONES UR QL: NEGATIVE
LEUKOCYTE EST, POC: NEGATIVE
Lab: ABNORMAL
NITRITE UR-MCNC: NEGATIVE MG/ML
PH UR: 5.5 [PH] (ref 5–8)
PROT UR STRIP-MCNC: ABNORMAL MG/DL
RBC # UR STRIP: NEGATIVE /UL
SP GR UR: 1.03 (ref 1–1.03)
UROBILINOGEN UR QL: ABNORMAL

## 2024-11-05 RX ORDER — BETAMETHASONE DIPROPIONATE 0.5 MG/G
1 OINTMENT, AUGMENTED TOPICAL 2 TIMES DAILY
Qty: 60 G | Refills: 5 | Status: SHIPPED | OUTPATIENT
Start: 2024-11-05 | End: 2025-05-04

## 2024-11-05 RX ORDER — TADALAFIL 5 MG/1
5 TABLET ORAL DAILY PRN
Qty: 90 TABLET | Refills: 1 | Status: SHIPPED | OUTPATIENT
Start: 2024-11-05 | End: 2025-05-04

## 2024-11-05 NOTE — PROGRESS NOTES
"Chief Complaint  Elevated PSA.    Prostatic nodule.    Peyronie's disease.    Erectile dysfunction    Lichen sclerosus of penis    Subjective no acute distress        Reji Mcmillan presents to [facility] UROLOG[facility]  History of Present Illness    79-year-old white male is here for evaluation of his prostate and elevated PSA.  He has erectile dysfunction and Peyronie's disease.  Patient has had 4 courses of Xiaflex injection and the curvature is improved.    From the time he is taking tamsulosin in the morning his nocturia has improved.    Continues to have problem with erectile dysfunction.  His wife has Alzheimer so at this time he cannot use his penis.    Objective no acute distress  Vital Signs:   /51 (BP Location: Left arm, Patient Position: Sitting, Cuff Size: Large Adult)   Pulse 73   Temp 98.7 °F (37.1 °C) (Tympanic)   Ht 177.8 cm (70\")   Wt 87.4 kg (192 lb 9.6 oz)   BMI 27.64 kg/m²     Allergies   Allergen Reactions    Sulfa Antibiotics Unknown - Low Severity     Flu like symptoms caused from taking medication      Past medical history:  has a past medical history of Allergic rhinitis due to allergen, Benign prostatic hyperplasia, CAD s/p CABG (03/30/2022), Chest pain, Chronic allergic rhinitis, CKD (chronic kidney disease) stage 3, GFR 30-59 ml/min, Eczema, Erectile dysfunction (10/19/2020), GERD (gastroesophageal reflux disease), Heart disease, High blood pressure, High cholesterol, Hypercholesteremia, Hypertension, Hypothyroidism, Orthostatic hypotension, Polio, and Prostate disorder.   Past surgical history:  has a past surgical history that includes Cardiac surgery; Cholecystectomy; Colonoscopy; Coronary artery bypass graft; Hernia repair; Dental surgery; Toe Surgery (Left); and Colonoscopy (N/A, 3/20/2023).  Personal history: family history includes Anxiety disorder in his mother and sister; Dementia in his mother; Depression in his sister; Heart attack in his father; Heart " disease in his mother; Hyperlipidemia in his father and mother; Hypertension in his father and mother; No Known Problems in his brother, cousin, maternal aunt, maternal grandfather, maternal grandmother, maternal uncle, paternal aunt, paternal grandfather, paternal grandmother, paternal uncle, and another family member.  Social history:  reports that he has never smoked. He has never been exposed to tobacco smoke. He has never used smokeless tobacco. He reports current alcohol use. He reports that he does not use drugs.    Review of Systems    Please see past medical and surgical history    Physical Exam  Constitutional:       General: He is not in acute distress.     Appearance: Normal appearance. He is normal weight. He is not ill-appearing or toxic-appearing.   HENT:      Head: Normocephalic and atraumatic.      Ears:      Comments: No loss of hearing  Abdominal:      Palpations: There is no mass.      Tenderness: There is no abdominal tenderness.   Genitourinary:     Comments: Penis is circumcised.  Knowles has whitish depigmentation and changes of lichen sclerosis at atrophicus.  Meatus is normal.    Right and left scrotum is normal.    Right left testicle and epididymis is normal.    Patient had MRI of prostate about a week ago and we are waiting for the report  Musculoskeletal:         General: Normal range of motion.   Skin:     General: Skin is warm.      Coloration: Skin is not jaundiced.   Neurological:      General: No focal deficit present.      Mental Status: He is alert and oriented to person, place, and time.      Motor: No weakness.      Gait: Gait normal.   Psychiatric:         Mood and Affect: Mood normal.         Behavior: Behavior normal.         Thought Content: Thought content normal.         Judgment: Judgment normal.        Result Review :                 Assessment and Plan    Diagnoses and all orders for this visit:    1. Erectile dysfunction due to arterial insufficiency (Primary)  -      POC Urinalysis Dipstick, Automated  -     tadalafil (CIALIS) 5 MG tablet; Take 1 tablet by mouth Daily As Needed for Erectile Dysfunction for up to 180 days.  Dispense: 90 tablet; Refill: 1    2. Elevated prostate specific antigen (PSA)    3. Prostatic nodule with obstruction  -     tadalafil (CIALIS) 5 MG tablet; Take 1 tablet by mouth Daily As Needed for Erectile Dysfunction for up to 180 days.  Dispense: 90 tablet; Refill: 1    4. Lichen sclerosus et atrophicus  -     betamethasone, augmented, (Diprolene) 0.05 % ointment; Apply 1 Application topically to the appropriate area as directed 2 (Two) Times a Day for 180 days.  Dispense: 60 g; Refill: 5    5. Peyronie's disease  -     tadalafil (CIALIS) 5 MG tablet; Take 1 tablet by mouth Daily As Needed for Erectile Dysfunction for up to 180 days.  Dispense: 90 tablet; Refill: 1    Start the patient on Diprolene ointment for lichen sclerosus atrophicus of the corona.  Will continue tadalafil 5 mg daily for erectile dysfunction and Peyronie's disease.  Refer the patient to Ms. Parul Paz for follow-up.    I am going to call him about the results of his MRI of prostate when it is available.     Brief Urine Lab Results  (Last result in the past 365 days)        Color   Clarity   Blood   Leuk Est   Nitrite   Protein   CREAT   Urine HCG        10/02/24 0951 Yellow   Clear   Negative   Trace   Negative   Negative                    Follow Up   No follow-ups on file.  Patient was given instructions and counseling regarding his condition or for health maintenance advice. Please see specific information pulled into the AVS if appropriate.     Mellisa Montes MD

## 2024-11-06 ENCOUNTER — TELEPHONE (OUTPATIENT)
Dept: UROLOGY | Facility: CLINIC | Age: 79
End: 2024-11-06
Payer: MEDICARE

## 2024-11-06 DIAGNOSIS — R97.20 ELEVATED PROSTATE SPECIFIC ANTIGEN (PSA): Primary | ICD-10-CM

## 2024-11-06 DIAGNOSIS — R93.5 ABNORMAL MRI, PELVIS: ICD-10-CM

## 2024-11-06 NOTE — TELEPHONE ENCOUNTER
I called the patient and informed him that there is abnormal area on MRI of his prostate.  I need to refer him to Dr. Dawson for second opinion and possible biopsy and he is agreeable.

## 2024-11-25 ENCOUNTER — OFFICE VISIT (OUTPATIENT)
Dept: INTERNAL MEDICINE | Age: 79
End: 2024-11-25
Payer: MEDICARE

## 2024-11-25 VITALS
HEART RATE: 81 BPM | BODY MASS INDEX: 27.32 KG/M2 | DIASTOLIC BLOOD PRESSURE: 80 MMHG | WEIGHT: 190.8 LBS | OXYGEN SATURATION: 99 % | TEMPERATURE: 97.7 F | SYSTOLIC BLOOD PRESSURE: 114 MMHG | HEIGHT: 70 IN

## 2024-11-25 DIAGNOSIS — R73.01 IMPAIRED FASTING GLUCOSE: ICD-10-CM

## 2024-11-25 DIAGNOSIS — N18.31 STAGE 3A CHRONIC KIDNEY DISEASE: ICD-10-CM

## 2024-11-25 DIAGNOSIS — F32.A ANXIETY AND DEPRESSION: ICD-10-CM

## 2024-11-25 DIAGNOSIS — F41.9 ANXIETY AND DEPRESSION: ICD-10-CM

## 2024-11-25 DIAGNOSIS — I10 ESSENTIAL HYPERTENSION: Primary | Chronic | ICD-10-CM

## 2024-11-25 DIAGNOSIS — I20.9 ANGINA PECTORIS: ICD-10-CM

## 2024-11-25 DIAGNOSIS — E03.9 HYPOTHYROIDISM, UNSPECIFIED TYPE: Chronic | ICD-10-CM

## 2024-11-25 DIAGNOSIS — K21.9 GASTROESOPHAGEAL REFLUX DISEASE, UNSPECIFIED WHETHER ESOPHAGITIS PRESENT: ICD-10-CM

## 2024-11-25 DIAGNOSIS — F51.01 PRIMARY INSOMNIA: ICD-10-CM

## 2024-11-25 DIAGNOSIS — E55.9 VITAMIN D DEFICIENCY: ICD-10-CM

## 2024-11-25 DIAGNOSIS — D50.9 IRON DEFICIENCY ANEMIA, UNSPECIFIED IRON DEFICIENCY ANEMIA TYPE: ICD-10-CM

## 2024-11-25 DIAGNOSIS — E78.2 MIXED HYPERLIPIDEMIA: Chronic | ICD-10-CM

## 2024-11-25 NOTE — PROGRESS NOTES
Chief Complaint  Follow-up (The patient is coming in for a 6 month follow up. The patient has no concerns. )    Subjective      History of Present Illness  The patient presents for evaluation of multiple medical concerns.    He has undergone MRIs for his prostate, head, and neck. Initially, he was informed that his prostate was normal, but a second opinion was recommended. He is scheduled to see a urologist next month. He takes Flomax in the middle of the day, avoiding it in the morning before the gym and at night before bed.    He is currently taking a multivitamin and plans to donate blood next month. His kidney function is stable. He is on Prozac, which seems to be effective. His sleep medication was switched from Ambien to mirtazapine 15 mg, which is also working well. He reports no dizziness. He is also on Prevacid 30 mg for acid reflux, which is effective. He has not received the shingles vaccine and reports no history of chickenpox.    He is on atorvastatin 80 mg a day. Cardiology has him on Ranexa 500 mg every 12 hours. He had open heart surgery.    He reports no leg swelling.    IMMUNIZATIONS  He has received influenza and COVID-19 vaccines.       Past Medical History:   Diagnosis Date    Allergic rhinitis due to allergen     Benign prostatic hyperplasia     CAD s/p CABG 03/30/2022    Chest pain     Chronic allergic rhinitis     CKD (chronic kidney disease) stage 3, GFR 30-59 ml/min     Eczema     Erectile dysfunction 10/19/2020    GERD (gastroesophageal reflux disease)     Heart disease     High blood pressure     High cholesterol     Hypercholesteremia     Hypertension     Controlled    Hypothyroidism     Orthostatic hypotension     Polio     Prostate disorder         Past Surgical History:   Procedure Laterality Date    CARDIAC SURGERY      CHOLECYSTECTOMY      COLONOSCOPY      COLONOSCOPY N/A 3/20/2023    Procedure: COLONOSCOPY;  Surgeon: Jacques Dc MD;  Location: [facility] ENDOSCOPY;  Service:  "General;  Laterality: N/A;  diverticulosis, hemorrhoids    CORONARY ARTERY BYPASS GRAFT      cabbage x2    DENTAL PROCEDURE      HERNIA REPAIR      TOE SURGERY Left         Social History     Tobacco Use   Smoking Status Never    Passive exposure: Never   Smokeless Tobacco Never        Patient Care Team:  Kenzie Mccloud APRN as PCP - General (Nurse Practitioner)  Douglas Gutiérrez MD as Consulting Physician (Nephrology)  Jon Mata MD as Consulting Physician (Cardiology)  Teddy Montes MD as Consulting Physician (Psychiatry)  Gustabo Dawson MD as Consulting Physician (Urology)    Allergies   Allergen Reactions    Sulfa Antibiotics Unknown - Low Severity     Flu like symptoms caused from taking medication          Current Outpatient Medications:     amLODIPine (NORVASC) 5 MG tablet, TAKE 1 TABLET DAILY, Disp: 90 tablet, Rfl: 3    aspirin 81 MG EC tablet, Take 1 tablet by mouth Daily., Disp: , Rfl:     atorvastatin (LIPITOR) 80 MG tablet, TAKE 1 TABLET DAILY, Disp: 90 tablet, Rfl: 3    B-D TB SYRINGE 1CC/27GX1/2\" 27G X 1/2\" 1 ML misc, USE FOR XIAFLEX INJECTION, Disp: 4 each, Rfl: 3    betamethasone, augmented, (Diprolene) 0.05 % ointment, Apply 1 Application topically to the appropriate area as directed 2 (Two) Times a Day for 180 days., Disp: 60 g, Rfl: 5    cetirizine (zyrTEC) 10 MG tablet, Take 1 tablet by mouth Daily., Disp: , Rfl:     Dupilumab (Dupixent) 300 MG/2ML solution pen-injector, Inject 1 mL under the skin into the appropriate area as directed Every 14 (Fourteen) Days., Disp: , Rfl:     FLUoxetine (PROzac) 10 MG capsule, Take 1 capsule by mouth Daily., Disp: 90 capsule, Rfl: 1    ketoconazole (NIZORAL) 2 % cream, Apply 1 application  topically to the appropriate area as directed Daily., Disp: 30 g, Rfl: 1    lansoprazole (PREVACID) 30 MG capsule, TAKE 1 CAPSULE DAILY, Disp: 90 capsule, Rfl: 3    levothyroxine (SYNTHROID, LEVOTHROID) 25 MCG tablet, TAKE 1 TABLET DAILY, Disp: 90 tablet, Rfl: " "3    losartan (COZAAR) 50 MG tablet, Take 1 tablet by mouth Daily., Disp: , Rfl:     mirtazapine (REMERON) 15 MG tablet, Take 1 tablet by mouth Every Night. (Patient taking differently: Take 1 tablet by mouth Every Night. 1.5 tab), Disp: 135 tablet, Rfl: 1    multivitamin with minerals tablet tablet, Take 1 tablet by mouth Daily., Disp: , Rfl:     ranolazine (RANEXA) 500 MG 12 hr tablet, TAKE 1 TABLET TWICE A DAY, Disp: 180 tablet, Rfl: 3    tadalafil (CIALIS) 5 MG tablet, Take 1 tablet by mouth Daily As Needed for Erectile Dysfunction for up to 180 days., Disp: 90 tablet, Rfl: 1    tamsulosin (FLOMAX) 0.4 MG capsule 24 hr capsule, Take 1 capsule by mouth Daily for 180 days., Disp: 90 capsule, Rfl: 1    Objective     Vitals:    11/25/24 1412   BP: 114/80   BP Location: Right arm   Patient Position: Sitting   Cuff Size: Large Adult   Pulse: 81   Temp: 97.7 °F (36.5 °C)   TempSrc: Temporal   SpO2: 99%   Weight: 86.5 kg (190 lb 12.8 oz)   Height: 177.8 cm (70\")        Wt Readings from Last 3 Encounters:   11/25/24 86.5 kg (190 lb 12.8 oz)   11/05/24 87.4 kg (192 lb 9.6 oz)   11/01/24 87.1 kg (192 lb)        BP Readings from Last 3 Encounters:   11/25/24 114/80   11/05/24 125/51   11/01/24 131/59        Physical Exam  Vital Signs  Blood pressure reading is 114/80.    Physical Exam  Vitals reviewed.   Constitutional:       General: He is not in acute distress.  HENT:      Head: Normocephalic and atraumatic.   Cardiovascular:      Rate and Rhythm: Normal rate and regular rhythm.   Pulmonary:      Effort: Pulmonary effort is normal.      Breath sounds: Normal breath sounds. No wheezing, rhonchi or rales.   Musculoskeletal:      Right lower leg: No edema.      Left lower leg: No edema.   Skin:     General: Skin is warm and dry.   Neurological:      General: No focal deficit present.      Mental Status: He is alert.   Psychiatric:         Thought Content: Thought content normal.                Result Review   The following " data was reviewed by: CARSON Gleason on 11/25/2024:  [x]  Tests & Results  []  Hospitalization/Emergency Department/Urgent Care  [x]  Internal/External Consultant Notes       Assessment and Plan     Diagnoses and all orders for this visit:    1. Essential hypertension (Primary)  -     Comprehensive Metabolic Panel; Future    2. Hypothyroidism, unspecified type  -     TSH+Free T4; Future    3. Impaired fasting glucose  -     Hemoglobin A1c; Future    4. Vitamin D deficiency  -     Vitamin D,25-Hydroxy; Future    5. Iron deficiency anemia, unspecified iron deficiency anemia type  -     CBC & Differential; Future  -     Iron Profile; Future  -     Ferritin; Future    6. Stage 3a chronic kidney disease    7. Gastroesophageal reflux disease, unspecified whether esophagitis present    8. Anxiety and depression    9. Primary insomnia    10. Mixed hyperlipidemia  -     Comprehensive Metabolic Panel; Future  -     Lipid Panel; Future    11. Angina pectoris         Assessment & Plan  1. Hypertension.  His blood pressure is well-controlled at 114/80. He will continue his current regimen of losartan 50 mg daily and amlodipine 5 mg daily.    2. Thyroid Disorder.  His levels are within the normal range. He will continue his thyroid medication at 25 mcg daily.    3. Elevated Blood Sugar.  His A1c is at 5.5, indicating no diabetes. No changes to his current regimen are needed.    4. Vitamin D Deficiency.  His vitamin D levels are normal at 33.3. He will continue taking a multivitamin that includes vitamin D.    5. Iron Deficiency.  His iron levels were slightly low in September 2024, likely due to recent blood donation. His hemoglobin was at 12.3, slightly below the normal range of 13. He is advised to take an over-the-counter iron tablet for a short period to boost his blood count. He is also advised to take MiraLAX or stool softeners to counteract the constipating effects of the iron tablet.    6. Chronic Kidney  Disease.  His kidney function remains stable. No changes to his current regimen are needed.    7. Gastroesophageal Reflux Disease (GERD).  He will continue his Prevacid 30 mg for acid reflux but may consider reducing the frequency to every other day or a few times a week.    8. Depression.  He is currently on Prozac and reports that it seems to help him not get too upset. No changes to his current regimen are needed.    9. Insomnia.  He is taking mirtazapine 15 mg for sleep, which he reports is working depending on his nightly routine. No changes to his current regimen are needed.    10. Hyperlipidemia.  He will continue his atorvastatin 80 mg daily as prescribed by cardiology.    11. Angina.  He is on Ranexa 500 mg every 12 hours for chest pains. He is considering stopping it to see if it affects his symptoms, as advised by his cardiologist.    13. Health Maintenance.  He is advised to get the shingles vaccine. He has already received the flu and COVID-19 vaccines.       Patient was given instructions and counseling regarding his condition or for health maintenance advice. Please see specific information pulled into the AVS if appropriate.     Follow Up   Return in about 5 months (around 4/30/2025) for Medicare Wellness.    Patient or patient representative verbalized consent for the use of Ambient Listening during the visit with  CARSON Gleason for chart documentation. 11/27/2024  21:46 EST    CARSON Gleason

## 2024-11-27 PROBLEM — R73.01 IMPAIRED FASTING GLUCOSE: Status: ACTIVE | Noted: 2024-11-27

## 2024-12-09 ENCOUNTER — OFFICE VISIT (OUTPATIENT)
Dept: CARDIOLOGY | Facility: CLINIC | Age: 79
End: 2024-12-09
Payer: MEDICARE

## 2024-12-09 VITALS
SYSTOLIC BLOOD PRESSURE: 135 MMHG | DIASTOLIC BLOOD PRESSURE: 65 MMHG | WEIGHT: 192.8 LBS | HEIGHT: 70 IN | BODY MASS INDEX: 27.6 KG/M2 | HEART RATE: 61 BPM

## 2024-12-09 DIAGNOSIS — E78.2 MIXED HYPERLIPIDEMIA: ICD-10-CM

## 2024-12-09 DIAGNOSIS — I25.810 CORONARY ARTERY DISEASE INVOLVING CORONARY BYPASS GRAFT OF NATIVE HEART WITHOUT ANGINA PECTORIS: Primary | ICD-10-CM

## 2024-12-09 DIAGNOSIS — I10 ESSENTIAL HYPERTENSION: ICD-10-CM

## 2024-12-09 PROCEDURE — 3075F SYST BP GE 130 - 139MM HG: CPT | Performed by: NURSE PRACTITIONER

## 2024-12-09 PROCEDURE — 93000 ELECTROCARDIOGRAM COMPLETE: CPT | Performed by: NURSE PRACTITIONER

## 2024-12-09 PROCEDURE — 99214 OFFICE O/P EST MOD 30 MIN: CPT | Performed by: NURSE PRACTITIONER

## 2024-12-09 PROCEDURE — 1159F MED LIST DOCD IN RCRD: CPT | Performed by: NURSE PRACTITIONER

## 2024-12-09 PROCEDURE — 3078F DIAST BP <80 MM HG: CPT | Performed by: NURSE PRACTITIONER

## 2024-12-09 PROCEDURE — 1160F RVW MEDS BY RX/DR IN RCRD: CPT | Performed by: NURSE PRACTITIONER

## 2024-12-09 RX ORDER — RANOLAZINE 500 MG/1
500 TABLET, EXTENDED RELEASE ORAL 2 TIMES DAILY
Qty: 180 TABLET | Refills: 3 | Status: SHIPPED | OUTPATIENT
Start: 2024-12-09

## 2024-12-09 NOTE — PROGRESS NOTES
Chief Complaint  Follow-up, Coronary Artery Disease, Hyperlipidemia, and Hypertension    Subjective            History of Present Illness  Reji Mcmillan is a 79-year-old male patient who presents to the office today for follow-up.  He has CAD with prior CABG, hypertension, and hyperlipidemia.  He is compliant with medication.  He denies any new or worsening cardiac symptoms today.    PMH  Past Medical History:   Diagnosis Date    Allergic rhinitis due to allergen     Benign prostatic hyperplasia     CAD s/p CABG 03/30/2022    Chest pain     Chronic allergic rhinitis     CKD (chronic kidney disease) stage 3, GFR 30-59 ml/min     Eczema     Erectile dysfunction 10/19/2020    GERD (gastroesophageal reflux disease)     Heart disease     High blood pressure     High cholesterol     Hypercholesteremia     Hypertension     Controlled    Hypothyroidism     Orthostatic hypotension     Polio     Prostate disorder          ALLERGY  Allergies   Allergen Reactions    Sulfa Antibiotics Unknown - Low Severity     Flu like symptoms caused from taking medication          SURGICALHX  Past Surgical History:   Procedure Laterality Date    CARDIAC SURGERY      CHOLECYSTECTOMY      COLONOSCOPY      COLONOSCOPY N/A 3/20/2023    Procedure: COLONOSCOPY;  Surgeon: Jacques Dc MD;  Location: [facility] END[facility]COPY;  Service: General;  Laterality: N/A;  diverticulosis, hemorrhoids    CORONARY ARTERY BYPASS GRAFT      cabbage x2    DENTAL PROCEDURE      HERNIA REPAIR      TOE SURGERY Left           SOC  Social History     Socioeconomic History    Marital status:    Tobacco Use    Smoking status: Never     Passive exposure: Never    Smokeless tobacco: Never   Vaping Use    Vaping status: Never Used   Substance and Sexual Activity    Alcohol use: Yes     Comment: 2-3 times weekly due to stress.    Drug use: Never    Sexual activity: Defer         FAMHX  Family History   Problem Relation Age of Onset    Dementia Mother     Anxiety  disorder Mother     Hyperlipidemia Mother     Heart disease Mother     Hypertension Mother     Hyperlipidemia Father     Hypertension Father     Heart attack Father     Depression Sister     Anxiety disorder Sister     No Known Problems Brother     No Known Problems Maternal Aunt     No Known Problems Paternal Aunt     No Known Problems Maternal Uncle     No Known Problems Paternal Uncle     No Known Problems Maternal Grandfather     No Known Problems Maternal Grandmother     No Known Problems Paternal Grandfather     No Known Problems Paternal Grandmother     No Known Problems Cousin     No Known Problems Other     Malig Hyperthermia Neg Hx     ADD / ADHD Neg Hx     Alcohol abuse Neg Hx     Bipolar disorder Neg Hx     Drug abuse Neg Hx     OCD Neg Hx     Paranoid behavior Neg Hx     Schizophrenia Neg Hx     Seizures Neg Hx     Self-Injurious Behavior  Neg Hx     Suicide Attempts Neg Hx           MEDSIGONLY  Current Outpatient Medications on File Prior to Visit   Medication Sig    amLODIPine (NORVASC) 5 MG tablet TAKE 1 TABLET DAILY    aspirin 81 MG EC tablet Take 1 tablet by mouth Daily.    atorvastatin (LIPITOR) 80 MG tablet TAKE 1 TABLET DAILY    cetirizine (zyrTEC) 10 MG tablet Take 1 tablet by mouth Daily.    Dupilumab (Dupixent) 300 MG/2ML solution pen-injector Inject 1 mL under the skin into the appropriate area as directed Every 14 (Fourteen) Days.    FLUoxetine (PROzac) 10 MG capsule Take 1 capsule by mouth Daily.    ketoconazole (NIZORAL) 2 % cream Apply 1 application  topically to the appropriate area as directed Daily.    lansoprazole (PREVACID) 30 MG capsule TAKE 1 CAPSULE DAILY    levothyroxine (SYNTHROID, LEVOTHROID) 25 MCG tablet TAKE 1 TABLET DAILY    losartan (COZAAR) 50 MG tablet Take 1 tablet by mouth Daily.    mirtazapine (REMERON) 15 MG tablet Take 1 tablet by mouth Every Night. (Patient taking differently: Take 1 tablet by mouth Every Night. 1.5 tab)    multivitamin with minerals tablet tablet  "Take 1 tablet by mouth Daily.    ranolazine (RANEXA) 500 MG 12 hr tablet TAKE 1 TABLET TWICE A DAY    tadalafil (CIALIS) 5 MG tablet Take 1 tablet by mouth Daily As Needed for Erectile Dysfunction for up to 180 days.    tamsulosin (FLOMAX) 0.4 MG capsule 24 hr capsule Take 1 capsule by mouth Daily for 180 days.    B-D TB SYRINGE 1CC/27GX1/2\" 27G X 1/2\" 1 ML misc USE FOR XIAFLEX INJECTION (Patient not taking: Reported on 12/9/2024)    betamethasone, augmented, (Diprolene) 0.05 % ointment Apply 1 Application topically to the appropriate area as directed 2 (Two) Times a Day for 180 days. (Patient not taking: Reported on 12/9/2024)     No current facility-administered medications on file prior to visit.         Objective   /65   Pulse 61   Ht 177.8 cm (70\")   Wt 87.5 kg (192 lb 12.8 oz)   BMI 27.66 kg/m²       Physical Exam  HENT:      Head: Normocephalic.   Neck:      Vascular: No carotid bruit.   Cardiovascular:      Rate and Rhythm: Normal rate and regular rhythm.      Pulses: Normal pulses.      Heart sounds: Normal heart sounds. No murmur heard.  Pulmonary:      Effort: Pulmonary effort is normal.      Breath sounds: Normal breath sounds.   Musculoskeletal:      Cervical back: Neck supple.      Right lower leg: No edema.      Left lower leg: No edema.   Skin:     General: Skin is dry.   Neurological:      Mental Status: He is alert and oriented to person, place, and time.   Psychiatric:         Behavior: Behavior normal.       ECG 12 Lead    Date/Time: 12/9/2024 11:12 AM  Performed by: Varsha Jang APRN    Authorized by: Varsha Jang APRN  Comparison: compared with previous ECG from 11/20/2023  Similar to previous ECG  Rhythm: sinus rhythm  Rate: bradycardic  BPM: 59  Conduction: conduction normal  T Waves: T waves normal  QRS axis: normal  Other findings: non-specific ST-T wave changes    Clinical impression: abnormal EKG      Result Review :   The following data was reviewed by: " "Varsha Jang, APRN on 12/09/2024:  No results found for: \"PROBNP\"  CMP          9/26/2024    11:47   CMP   Glucose 106    BUN 17    Creatinine 1.54    EGFR 45.9    Sodium 140    Potassium 4.2    Chloride 106    Calcium 9.1    Total Protein 7.2    Albumin 4.2    Globulin 3.0    Total Bilirubin 0.4    Alkaline Phosphatase 64    AST (SGOT) 20    ALT (SGPT) 15    Albumin/Globulin Ratio 1.4    BUN/Creatinine Ratio 11.0    Anion Gap 10.9      CBC w/diff          9/26/2024    11:47   CBC w/Diff   WBC 7.02    RBC 4.29    Hemoglobin 12.3    Hematocrit 36.8    MCV 85.8    MCH 28.7    MCHC 33.4    RDW 13.6    Platelets 288    Neutrophil Rel % 75.9    Immature Granulocyte Rel % 0.7    Lymphocyte Rel % 13.1    Monocyte Rel % 9.1    Eosinophil Rel % 1.1    Basophil Rel % 0.1       Lab Results   Component Value Date    TSH 2.720 09/26/2024      Lab Results   Component Value Date    FREET4 1.18 09/26/2024      No results found for: \"DDIMERQUANT\"  Magnesium   Date Value Ref Range Status   09/26/2024 1.9 1.6 - 2.4 mg/dL Final      No results found for: \"DIGOXIN\"   No results found for: \"TROPONINT\"            9/26/2024    11:47   Lipid Panel   Total Cholesterol 137    Triglycerides 88    HDL Cholesterol 55    VLDL Cholesterol 17    LDL Cholesterol  65    LDL/HDL Ratio 1.17             Assessment and Plan    Diagnoses and all orders for this visit:    1. CAD s/p CABG (Primary)  He denies any angina, continue aspirin 81 mg daily and Ranexa 500 mg twice daily.  -     ECG 12 Lead    2. Essential hypertension  Currently controlled without adverse effect from medication, continue amlodipine 5 mg daily and losartan 50 mg daily.    3. Mixed hyperlipidemia  Last lipid panel was 9/26/2024 with LDL 65 which is within goal range, continue atorvastatin 80 mg daily.      Other orders  -     ranolazine (RANEXA) 500 MG 12 hr tablet; Take 1 tablet by mouth 2 (Two) Times a Day.  Dispense: 180 tablet; Refill: 3            Follow Up   Return in about " 6 months (around 6/9/2025) for Follow up with Dr Mata.    Patient was given instructions and counseling regarding his condition or for health maintenance advice. Please see specific information pulled into the AVS if appropriate.     Reji Mcmillan  reports that he has never smoked. He has never been exposed to tobacco smoke. He has never used smokeless tobacco.            CARSON Malik  12/09/24  11:08 EST    Dictated Utilizing Dragon Dictation

## 2024-12-12 ENCOUNTER — OFFICE VISIT (OUTPATIENT)
Dept: PSYCHIATRY | Facility: CLINIC | Age: 79
End: 2024-12-12
Payer: MEDICARE

## 2024-12-12 VITALS
BODY MASS INDEX: 27.43 KG/M2 | WEIGHT: 191.6 LBS | SYSTOLIC BLOOD PRESSURE: 132 MMHG | DIASTOLIC BLOOD PRESSURE: 64 MMHG | HEIGHT: 70 IN | OXYGEN SATURATION: 98 % | HEART RATE: 63 BPM

## 2024-12-12 DIAGNOSIS — F33.1 MAJOR DEPRESSIVE DISORDER, RECURRENT EPISODE, MODERATE: Primary | ICD-10-CM

## 2024-12-12 DIAGNOSIS — F41.1 GENERALIZED ANXIETY DISORDER: ICD-10-CM

## 2024-12-12 DIAGNOSIS — F51.05 INSOMNIA DUE TO MENTAL CONDITION: ICD-10-CM

## 2024-12-12 RX ORDER — MIRTAZAPINE 30 MG/1
30 TABLET, FILM COATED ORAL NIGHTLY
Qty: 90 TABLET | Refills: 1 | Status: SHIPPED | OUTPATIENT
Start: 2024-12-12

## 2024-12-12 NOTE — TREATMENT PLAN
Multi-Disciplinary Problems (from Behavioral Health Treatment Plan)      Active Problems       Problem: Anxiety  Start Date: 12/12/24      Problem Details: The patient self-scales this problem as a 4 with 10 being the worst.   wife's situation, frustration with her worsening condition        Goal Priority Start Date Expected End Date End Date    Patient will develop and implement behavioral and cognitive strategies to reduce anxiety and irrational fears. -- 12/12/24 06/12/25 --    Goal Details: Progress toward goal:  improving          Goal Intervention Frequency Start Date End Date    Help patient explore past emotional issues in relation to present anxiety. Q Month 12/12/24 --    Intervention Details: Duration of treatment until remission of symptoms.          Goal Intervention Frequency Start Date End Date    Help patient develop an awareness of their cognitive and physical responses to anxiety. Q Month 12/12/24 --    Intervention Details: Duration of treatment until remission of symptoms.                  Problem: Depression  Start Date: 12/12/24      Problem Details: The patient self-scales this problem as a 3 with 10 being the worst.   wife's situation, frustration with her worsening condition        Goal Priority Start Date Expected End Date End Date    Patient will demonstrate the ability to initiate new constructive life skills outside of sessions on a consistent basis. -- 12/12/24 06/12/25 --    Goal Details: Progress toward goal:  improving          Goal Intervention Frequency Start Date End Date    Assist patient in setting attainable activities of daily living goals. PRN 12/12/24 --      Goal Intervention Frequency Start Date End Date    Provide education about depression Q Month 12/12/24 --    Intervention Details: Duration of treatment until remission of symptoms.          Goal Intervention Frequency Start Date End Date    Assist patient in developing healthy coping strategies. Q Month 12/12/24 --     Intervention Details: Duration of treatment until remission of symptoms.                          Reviewed By       Teddy Montes MD 12/12/24 [id number]                     I have discussed and reviewed this treatment plan with the patient.

## 2024-12-12 NOTE — PLAN OF CARE
Amie psychotherapy to help manage depression and anxiety related to  wife's situation, frustration with her worsening condition

## 2024-12-12 NOTE — PROGRESS NOTES
"Subjective   Reji Mcmillan is a 79 y.o. male who presents today for initial evaluation     Referring Provider:  No referring provider defined for this encounter.    Chief Complaint:  depression    History of Present Illness:     2024: INITIAL VISIT Chart review:     Dewey: Ambien CR 12.5 mg nightly chronically  Care Everywhere: Several non behavioral health notes pertaining to chronic kidney disease    Psychotropic medication chart review:  Present:  Ambien CR 12.5 mg at night    Previously:  Paxil 10 mg a day    EKG: 2023: 55, sinus, QTc 419.  Procedures: none  Head imaging: none  Labs: 2024: Creatinine 1.5, glucose 103, calcium 7.6, otherwise reassuring CMP.  Reassuring A1c, thyroid studies, lipids, vitamin D, CBC.  Total saturation  Iron profile is slightly low at 18.  UDS entirely negative in 2023.  Initial Chart Review Notes: Seen by primary care in April.  PHQ-9 is a 4.  Seen as a follow-up for anxiety, insomnia.  Sleeping well on Ambien.  Having some fatigue.  Referred to us for anxiety and depression.      Chart Review By Dates:  2024: cards, int med, urology; tr protein on UA.  EK, sinus, inferior infarct, old.  QTc 438  2024: nephro, urol, abnl UA; high PSA; reassuring thyroid studies; abnl cmp cr 1.54 gluc 106.  2024: no visits.  2024: cr 1.60, eGFR 44. Doubled mirtazapine dose.  7/15/24: no visits.    Plannin2024: Stable, well, no med changes. Plans to do more reading before bed (30 min before).   2024: Improved, possibly stable.   2024: Improving, increase mirtazapine. Thinking about getting help with his wife at home.    VISITS/APPOINTMENTS (BELOW):    \"Reji\"   Stage IIIb chronic kidney disease  Hx open heart surgery, 2x bypass. .    2024:   In person interview:  \"I'm surviving, but not very well.\"  Discussed his wife. She's gone into real dementia. Aggressive. Nicole. She's now on rexulti.  Depressed mood, " "anxioius  \"I've been ok\"  Looking into getting help to take care of his wife, ongoing. Has had a SW look into it, they don't qualify for medicare (make too much money).  MDD: depressed mood  KIMBERLY: excessive worrying, on edge  Panic attacks: stable  Energy: down  Concentration: stable  Insomnia: initial and maintenance 2/2 Wife plus mental health  Eating/Weight: 192, 190, 193, 184  Refills: yes  Substances: denies  Therapy: none  Medication compliant: yes  SE: none  No SI HI AVH.       11/01/2024:   In person interview:  \"It's been a busy week.\"  Discussed his wife  \"I've been ok\"  Looking into getting help to take care of his wife, ongoing. Has had a SW look into it, they don't qualify for medicare (make too much money).  MDD: possibly stable  KIMBERLY: stable, but worried about his wife.  Panic attacks: stable  Energy: stable  Concentration: stable  Insomnia: stable  Eating/Weight: 192, 190, 193, 184  Refills: yes  Substances: denies  Therapy: none  Medication compliant: yes  SE: none  No SI HI AVH.       09/23/2024:   In person interview:  \"I think I'm improving... it's my wife.\"  I'm learning not to be angry with her  Discussed wife  I've been looking into getting help  MDD: possibly stable  KIMBERLY: possibly stable  Panic attacks: stable  Energy: stable  Concentration: stable  Insomnia: stable  Eating/Weight: 190, 193, 184  Refills: yes  Substances: denies  Therapy: none  Medication compliant: yes  SE: none  No SI HI AVH.       08/19/2024:   In person interview:  \"I'm still dealing with my wife's situation.\"  Discussed anger  Takes all day to get her out of bed  She doesn't talk like she used to  Taking three mirtazapine helped with insomnia, took 30 minutes to sleep  MDD: Improving mood  KIMBERLY: improving  Panic attacks: stable  Energy: improving  Concentration: improving  Insomnia: initial, improving  Eating/Weight: 193, 184  Refills: y  Substances: denies  Therapy: none  Medication compliant: y  SE: n  No SI HI AVH. " "      07/15/2024: In person interview:  \"Well, I didn't know what to expect.\"  One of my friends noticed I'm a little more talkative.  Not dreading to do stuff.  I'd like something else for sleep. I don't want to be on narcotics.  MDD: Improving mood  KIMBERLY: improving  Panic attacks: n  Energy: improving  Concentration: improving  Insomnia: stable on ambien, but vivid dreams  Eating/Weight: 184  Refills: y  Substances: denies  Therapy: n  Medication compliant: y  SE: n  No SI HI AVH.      06/12/2024: In person.  Interview:  His/Her Story: \"Ambien helps, but it doesn't hold me down.\"  P14, G7  Racing thoughts at night  Friends have said I've changed, I'm more \"low\" and not as enthusiastic as I used to be  Has been on paxil for more than 3 years   54 years, wife has dementia, additional stressor  She's losing function  Hx open heart surgery, 2x bypass.  Depression/Mood:  Depressed mood, anhedonia,   poor energy, poor concentration,   insomnia.  Seasonal pattern: def  Severity: Moderate  Duration: Many years  Anxiety:  Uncontrolled worrying, muscle tension, fatigue, poor concentration, feeling on edge  irritability, insomnia.  Severity: Moderate  Duration: Many years  Panic attacks: n  Psych ROS: Positive for depression, anxiety.  Negative for psychosis and shweta.  ADHD: def  PTSD: def  No SI HI AVH.  Medication compliant: y    Access to Firearms:  yes, locked away    PHQ-9 Depression Screening  PHQ-9 Total Score:      Little interest or pleasure in doing things?     Feeling down, depressed, or hopeless?     Trouble falling or staying asleep, or sleeping too much?     Feeling tired or having little energy?     Poor appetite or overeating?     Feeling bad about yourself - or that you are a failure or have let yourself or your family down?     Trouble concentrating on things, such as reading the newspaper or watching television?     Moving or speaking so slowly that other people could have noticed? Or the opposite - " being so fidgety or restless that you have been moving around a lot more than usual?     Thoughts that you would be better off dead, or of hurting yourself in some way?     PHQ-9 Total Score       KIMBERLY-7       Past Surgical History:  Past Surgical History:   Procedure Laterality Date    CARDIAC SURGERY      CHOLECYSTECTOMY      COLONOSCOPY      COLONOSCOPY N/A 3/20/2023    Procedure: COLONOSCOPY;  Surgeon: Jacques Dc MD;  Location: [facility];  Service: General;  Laterality: N/A;  diverticulosis, hemorrhoids    CORONARY ARTERY BYPASS GRAFT      cabbage x2    DENTAL PROCEDURE      HERNIA REPAIR      TOE SURGERY Left        Problem List:  Patient Active Problem List   Diagnosis    Essential hypertension    Mixed hyperlipidemia    Hypothyroidism    Vitamin D deficiency    Anxiety and depression    Insomnia    Anemia    Chronic low back pain    Nodular hyperplasia of prostate gland    Elevated prostate specific antigen (PSA)    Disorder of arteries and arterioles, unspecified    Stage 3 chronic kidney disease    CAD s/p CABG    Peyronie disease    Vasculogenic erectile dysfunction    History of elevated PSA    Screening for malignant neoplasm of colon    History of colonic polyps    Other male erectile dysfunction    Impaired fasting glucose       Allergy:   Allergies   Allergen Reactions    Sulfa Antibiotics Unknown - Low Severity     Flu like symptoms caused from taking medication        Discontinued Medications:  Medications Discontinued During This Encounter   Medication Reason    mirtazapine (REMERON) 15 MG tablet Reorder    FLUoxetine (PROzac) 10 MG capsule Reorder               Current Medications:   Current Outpatient Medications   Medication Sig Dispense Refill    FLUoxetine (PROzac) 20 MG capsule Take 1 capsule by mouth Daily. 90 capsule 1    mirtazapine (REMERON) 30 MG tablet Take 1 tablet by mouth Every Night. 90 tablet 1    amLODIPine (NORVASC) 5 MG tablet TAKE 1 TABLET DAILY 90 tablet 3     aspirin 81 MG EC tablet Take 1 tablet by mouth Daily.      atorvastatin (LIPITOR) 80 MG tablet TAKE 1 TABLET DAILY 90 tablet 3    cetirizine (zyrTEC) 10 MG tablet Take 1 tablet by mouth Daily.      Dupilumab (Dupixent) 300 MG/2ML solution pen-injector Inject 1 mL under the skin into the appropriate area as directed Every 14 (Fourteen) Days.      ketoconazole (NIZORAL) 2 % cream Apply 1 application  topically to the appropriate area as directed Daily. 30 g 1    lansoprazole (PREVACID) 30 MG capsule TAKE 1 CAPSULE DAILY 90 capsule 3    levothyroxine (SYNTHROID, LEVOTHROID) 25 MCG tablet TAKE 1 TABLET DAILY 90 tablet 3    losartan (COZAAR) 50 MG tablet Take 1 tablet by mouth Daily.      multivitamin with minerals tablet tablet Take 1 tablet by mouth Daily.      ranolazine (RANEXA) 500 MG 12 hr tablet Take 1 tablet by mouth 2 (Two) Times a Day. 180 tablet 3    tadalafil (CIALIS) 5 MG tablet Take 1 tablet by mouth Daily As Needed for Erectile Dysfunction for up to 180 days. 90 tablet 1    tamsulosin (FLOMAX) 0.4 MG capsule 24 hr capsule Take 1 capsule by mouth Daily for 180 days. 90 capsule 1     No current facility-administered medications for this visit.       Past Medical History:  Past Medical History:   Diagnosis Date    Allergic rhinitis due to allergen     Benign prostatic hyperplasia     CAD s/p CABG 03/30/2022    Chest pain     Chronic allergic rhinitis     CKD (chronic kidney disease) stage 3, GFR 30-59 ml/min     Eczema     Erectile dysfunction 10/19/2020    GERD (gastroesophageal reflux disease)     Heart disease     High blood pressure     High cholesterol     Hypercholesteremia     Hypertension     Controlled    Hypothyroidism     Orthostatic hypotension     Polio     Prostate disorder        Past Psychiatric History:  Began Treatment: PCP for years  Diagnoses: MDD  Psychiatrist:Denies  Therapist:Denies  Admission History:Denies  Medication Trials:    Paxil only, used to work    Self Harm: Denies  Suicide  Attempts:Denies      Substance Abuse History:   Types: 1 airplane bottle 4x/wk  Withdrawal Symptoms:Denies  Longest Period Sober:Not Applicable   AA: Not applicable     Social History:  Martial Status:  Employed:No. Taught auto mechanics for 44 years, industrial maintenance  Kids:Yes  House:Lives in a house   History: Denies    Social History     Socioeconomic History    Marital status:    Tobacco Use    Smoking status: Never     Passive exposure: Never    Smokeless tobacco: Never   Vaping Use    Vaping status: Never Used   Substance and Sexual Activity    Alcohol use: Yes     Comment: 2-3 times weekly due to stress.    Drug use: Never    Sexual activity: Defer       Family History:   Suicide Attempts: Denies  Suicide Completions:Denies      Family History   Problem Relation Age of Onset    Dementia Mother     Anxiety disorder Mother     Hyperlipidemia Mother     Heart disease Mother     Hypertension Mother     Hyperlipidemia Father     Hypertension Father     Heart attack Father     Depression Sister     Anxiety disorder Sister     No Known Problems Brother     No Known Problems Maternal Aunt     No Known Problems Paternal Aunt     No Known Problems Maternal Uncle     No Known Problems Paternal Uncle     No Known Problems Maternal Grandfather     No Known Problems Maternal Grandmother     No Known Problems Paternal Grandfather     No Known Problems Paternal Grandmother     No Known Problems Cousin     No Known Problems Other     Malig Hyperthermia Neg Hx     ADD / ADHD Neg Hx     Alcohol abuse Neg Hx     Bipolar disorder Neg Hx     Drug abuse Neg Hx     OCD Neg Hx     Paranoid behavior Neg Hx     Schizophrenia Neg Hx     Seizures Neg Hx     Self-Injurious Behavior  Neg Hx     Suicide Attempts Neg Hx        Developmental History:       Childhood: Denies Abuse  High School:Completed  College: 4 years, education degree    Mental Status Exam  Appearance  : groomed, good eye contact, normal street  "clothes  Behavior  : pleasant and cooperative  Motor  : No abnormal  Speech  :normal rhythm, rate, volume, tone, not hyperverbal, not pressured, normal prosidy  Mood  : \"I've been surviving, but not well\"  Affect  : mild depression, mood congruent, good variability  Thought Content  : negative suicidal ideations, negative homicidal ideations, negative obsessions  Perceptions  : negative auditory hallucinations, negative visual hallucinations  Thought Process  : linear  Insight/Judgement  : Fair/fair  Cognition  : grossly intact  Attention   : intact      Review of Systems:  Review of Systems   Constitutional:  Positive for activity change, fatigue and unexpected weight change.   Psychiatric/Behavioral:  Positive for agitation and sleep disturbance.        Physical Exam:  Physical Exam    Vital Signs:   /64   Pulse 63   Ht 177.8 cm (70\")   Wt 86.9 kg (191 lb 9.6 oz)   SpO2 98%   BMI 27.49 kg/m²      Lab Results:   Office Visit on 11/05/2024   Component Date Value Ref Range Status    Color 11/05/2024 Dark Yellow  Yellow, Straw, Dark Yellow, Juliet Final    Clarity, UA 11/05/2024 Clear  Clear Final    Specific Gravity  11/05/2024 1.030  1.005 - 1.030 Final    pH, Urine 11/05/2024 5.5  5.0 - 8.0 Final    Leukocytes 11/05/2024 Negative  Negative Final    Nitrite, UA 11/05/2024 Negative  Negative Final    Protein, POC 11/05/2024 Trace (A)  Negative mg/dL Final    Glucose, UA 11/05/2024 Negative  Negative mg/dL Final    Ketones, UA 11/05/2024 Negative  Negative Final    Urobilinogen, UA 11/05/2024 0.2 E.U./dL  Normal, 0.2 E.U./dL Final    Bilirubin 11/05/2024 Negative  Negative Final    Blood, UA 11/05/2024 Negative  Negative Final    Lot Number 11/05/2024 [id number]   Final    Expiration Date 11/05/2024 7,312,025   Final   Office Visit on 10/02/2024   Component Date Value Ref Range Status    Color 10/02/2024 Yellow  Yellow, Straw, Dark Yellow, Juliet Final    Clarity, UA 10/02/2024 Clear  Clear Final    Specific " Gravity  10/02/2024 1.005  1.005 - 1.030 Final    pH, Urine 10/02/2024 5.5  5.0 - 8.0 Final    Leukocytes 10/02/2024 Trace (A)  Negative Final    Nitrite, UA 10/02/2024 Negative  Negative Final    Protein, POC 10/02/2024 Negative  Negative mg/dL Final    Glucose, UA 10/02/2024 Negative  Negative mg/dL Final    Ketones, UA 10/02/2024 Negative  Negative Final    Urobilinogen, UA 10/02/2024 0.2 E.U./dL  Normal, 0.2 E.U./dL Final    Bilirubin 10/02/2024 Negative  Negative Final    Blood, UA 10/02/2024 Negative  Negative Final    Lot Number 10/02/2024 [id number]   Final    Expiration Date 10/02/2024 07/31/25   Final    RBC, UA 10/02/2024 None Seen  None Seen, 0-2 /HPF Final    WBC, UA 10/02/2024 None Seen  None Seen, 0-2 /HPF Final    Bacteria, UA 10/02/2024 None Seen  None Seen /HPF Final    Crystals, UA 10/02/2024 None Seen  /HPF Final    Epithelial Cells (non renal) 10/02/2024 0   Final    Renal Epithel, UA 10/02/2024 0   Final   Lab on 09/26/2024   Component Date Value Ref Range Status    PSA 09/26/2024 4.760 (H)  0.000 - 4.000 ng/mL Final    Glucose 09/26/2024 106 (H)  65 - 99 mg/dL Final    BUN 09/26/2024 17  8 - 23 mg/dL Final    Creatinine 09/26/2024 1.54 (H)  0.76 - 1.27 mg/dL Final    Sodium 09/26/2024 140  136 - 145 mmol/L Final    Potassium 09/26/2024 4.2  3.5 - 5.2 mmol/L Final    Chloride 09/26/2024 106  98 - 107 mmol/L Final    CO2 09/26/2024 23.1  22.0 - 29.0 mmol/L Final    Calcium 09/26/2024 9.1  8.6 - 10.5 mg/dL Final    Total Protein 09/26/2024 7.2  6.0 - 8.5 g/dL Final    Albumin 09/26/2024 4.2  3.5 - 5.2 g/dL Final    ALT (SGPT) 09/26/2024 15  1 - 41 U/L Final    AST (SGOT) 09/26/2024 20  1 - 40 U/L Final    Alkaline Phosphatase 09/26/2024 64  39 - 117 U/L Final    Total Bilirubin 09/26/2024 0.4  0.0 - 1.2 mg/dL Final    Globulin 09/26/2024 3.0  gm/dL Final    A/G Ratio 09/26/2024 1.4  g/dL Final    BUN/Creatinine Ratio 09/26/2024 11.0  7.0 - 25.0 Final    Anion Gap 09/26/2024 10.9  5.0 - 15.0 mmol/L  Final    eGFR 09/26/2024 45.9 (L)  >60.0 mL/min/1.73 Final    TSH 09/26/2024 2.720  0.270 - 4.200 uIU/mL Final    Total Cholesterol 09/26/2024 137  0 - 200 mg/dL Final    Triglycerides 09/26/2024 88  0 - 150 mg/dL Final    HDL Cholesterol 09/26/2024 55  40 - 60 mg/dL Final    LDL Cholesterol  09/26/2024 65  0 - 100 mg/dL Final    VLDL Cholesterol 09/26/2024 17  5 - 40 mg/dL Final    LDL/HDL Ratio 09/26/2024 1.17   Final    Vitamin B-12 09/26/2024 461  211 - 946 pg/mL Final    Folate 09/26/2024 >20.00  4.78 - 24.20 ng/mL Final    Iron 09/26/2024 40 (L)  59 - 158 mcg/dL Final    Magnesium 09/26/2024 1.9  1.6 - 2.4 mg/dL Final    Free T4 09/26/2024 1.18  0.92 - 1.68 ng/dL Final    25 Hydroxy, Vitamin D 09/26/2024 33.3  30.0 - 100.0 ng/ml Final    Hemoglobin A1C 09/26/2024 5.50  4.80 - 5.60 % Final    PTH, Intact 09/26/2024 34.2  15.0 - 65.0 pg/mL Final    WBC 09/26/2024 7.02  3.40 - 10.80 10*3/mm3 Final    RBC 09/26/2024 4.29  4.14 - 5.80 10*6/mm3 Final    Hemoglobin 09/26/2024 12.3 (L)  13.0 - 17.7 g/dL Final    Hematocrit 09/26/2024 36.8 (L)  37.5 - 51.0 % Final    MCV 09/26/2024 85.8  79.0 - 97.0 fL Final    MCH 09/26/2024 28.7  26.6 - 33.0 pg Final    MCHC 09/26/2024 33.4  31.5 - 35.7 g/dL Final    RDW 09/26/2024 13.6  12.3 - 15.4 % Final    RDW-SD 09/26/2024 42.6  37.0 - 54.0 fl Final    MPV 09/26/2024 10.7  6.0 - 12.0 fL Final    Platelets 09/26/2024 288  140 - 450 10*3/mm3 Final    Neutrophil % 09/26/2024 75.9  42.7 - 76.0 % Final    Lymphocyte % 09/26/2024 13.1 (L)  19.6 - 45.3 % Final    Monocyte % 09/26/2024 9.1  5.0 - 12.0 % Final    Eosinophil % 09/26/2024 1.1  0.3 - 6.2 % Final    Basophil % 09/26/2024 0.1  0.0 - 1.5 % Final    Immature Grans % 09/26/2024 0.7 (H)  0.0 - 0.5 % Final    Neutrophils, Absolute 09/26/2024 5.32  1.70 - 7.00 10*3/mm3 Final    Lymphocytes, Absolute 09/26/2024 0.92  0.70 - 3.10 10*3/mm3 Final    Monocytes, Absolute 09/26/2024 0.64  0.10 - 0.90 10*3/mm3 Final    Eosinophils,  Absolute 09/26/2024 0.08  0.00 - 0.40 10*3/mm3 Final    Basophils, Absolute 09/26/2024 0.01  0.00 - 0.20 10*3/mm3 Final    Immature Grans, Absolute 09/26/2024 0.05  0.00 - 0.05 10*3/mm3 Final    nRBC 09/26/2024 0.0  0.0 - 0.2 /100 WBC Final    Phosphorus 09/26/2024 2.4 (L)  2.5 - 4.5 mg/dL Final    Creatinine, Urine 09/26/2024 221.2  mg/dL Final    Total Protein, Urine 09/26/2024 25.1  mg/dL Final    Protein/Creatinine Ratio, Urine 09/26/2024 0.11   Final    Color, UA 09/26/2024 Dark Yellow (A)  Yellow, Straw Final    Appearance, UA 09/26/2024 Turbid (A)  Clear Final    pH, UA 09/26/2024 5.5  5.0 - 8.0 Final    Specific Gravity, UA 09/26/2024 1.024  1.005 - 1.030 Final    Glucose, UA 09/26/2024 Negative  Negative Final    Ketones, UA 09/26/2024 Trace (A)  Negative Final    Bilirubin, UA 09/26/2024 Negative  Negative Final    Blood, UA 09/26/2024 Negative  Negative Final    Protein, UA 09/26/2024 30 mg/dL (1+) (A)  Negative Final    Leuk Esterase, UA 09/26/2024 Trace (A)  Negative Final    Nitrite, UA 09/26/2024 Negative  Negative Final    Urobilinogen, UA 09/26/2024 1.0 E.U./dL  0.2 - 1.0 E.U./dL Final    RBC, UA 09/26/2024 0-2  None Seen, 0-2 /HPF Final    WBC, UA 09/26/2024 0-2  None Seen, 0-2 /HPF Final    Bacteria, UA 09/26/2024 None Seen  None Seen /HPF Final    Squamous Epithelial Cells, UA 09/26/2024 0-2  None Seen, 0-2 /HPF Final    Hyaline Casts, UA 09/26/2024 0-2  None Seen /LPF Final    Methodology 09/26/2024 Automated Microscopy   Final   Hospital Outpatient Visit on 08/01/2024   Component Date Value Ref Range Status    Creatinine 08/01/2024 1.60 (H)  0.60 - 1.30 mg/dL Final    Serial Number: [id number]:  [id number]    eGFR 08/01/2024 44.1 (L)  >60.0 mL/min/1.73 Final       EKG Results:  No orders to display       Imaging Results:  No Images in the past 120 days found..      Assessment & Plan   Diagnoses and all orders for this visit:    1. Major depressive disorder, recurrent episode, moderate  (Primary)  -     FLUoxetine (PROzac) 20 MG capsule; Take 1 capsule by mouth Daily.  Dispense: 90 capsule; Refill: 1  -     mirtazapine (REMERON) 30 MG tablet; Take 1 tablet by mouth Every Night.  Dispense: 90 tablet; Refill: 1    2. Generalized anxiety disorder  -     FLUoxetine (PROzac) 20 MG capsule; Take 1 capsule by mouth Daily.  Dispense: 90 capsule; Refill: 1  -     mirtazapine (REMERON) 30 MG tablet; Take 1 tablet by mouth Every Night.  Dispense: 90 tablet; Refill: 1    3. Insomnia due to mental condition  -     FLUoxetine (PROzac) 20 MG capsule; Take 1 capsule by mouth Daily.  Dispense: 90 capsule; Refill: 1  -     mirtazapine (REMERON) 30 MG tablet; Take 1 tablet by mouth Every Night.  Dispense: 90 tablet; Refill: 1        Visit Diagnoses:    ICD-10-CM ICD-9-CM   1. Major depressive disorder, recurrent episode, moderate  F33.1 296.32   2. Generalized anxiety disorder  F41.1 300.02   3. Insomnia due to mental condition  F51.05 300.9     327.02     12/12/2024: MDD, KIMBERLY, increase prozac and mirtazapine. Going to get 2 nurses to help him, which should lighten the burden/mood. Consider adding trazodone.    Acknowledged and normalized patient's thoughts, feelings, and concerns. Allowed patient to freely discuss and process issues, such as:  Anxiety and depression regarding family conflict/relationships.  Anxiety and depression regarding family's well-being.  ... using Rogerian psychotherapeutic techniques including unconditional positive regard, reflective listening, and demonstrating clear empathy, with the goal of ameliorating symptoms and maintaining, restoring, or improving self-esteem, adaptive skills, and ego or psychological functions (Shea et al. 1991), the long-term goal of which is to develop a better, healthier perspective and help the patient bear their circumstances more easily.  Time (minutes) spent providing supportive psychotherapy: 16  (This time is exclusive to the therapy session and  separate from the time spent on activities used to meet the criteria for the E/M service (history, exam, medical decision-making).)  Start: 1:16  Stop: 1:32  Functional status: mild impairment  Treatment plan: Medication management and supportive psychotherapy  Prognosis: good  Progress: MDD, KIMBERLY, insomnia  5w    11/01/2024: Stable, well, no med changes. Plans to do more reading before bed (30 min before).     09/23/2024: Improved, possibly stable.     08/19/2024: Improving, increase mirtazapine. Thinking about getting help with his wife at home.    07/15/2024: Improving MDD, KIMBERLY. Wants to trial another medication besides ambien (vivid dreams, hangover after). Start mirtazapine.    6/12: Slow wean off paxil (has been on for years), start prozac (safe in kidney dz). Wife is significant stressor. 4w    PLAN:  Safety: No acute safety concerns  Therapy: None  Risk Assessment: Risk of self-harm acutely is moderate.  Risk factors include anxiety disorder, mood disorder, access to firearms, and recent psychosocial stressors (pandemic). Protective factors include no family history, no present SI, no history of suicide attempts or self-harm in the past, minimal AODA, healthcare seeking, future orientation, willingness to engage in care.  Risk of self-harm chronically is also moderate, but could be further elevated in the event of treatment noncompliance and/or AODA.  Meds:  INCREASE mirtazapine 22.5 to 30 mg qhs (max dose 22.5 mg given eGFR). Risks, benefits, alternatives discussed with patient including GI upset, sedation, dizziness with falls risk, increased appetite.  Do not use before operating vehicle, vessel, or machine. After discussion of these risks and benefits, the patient voiced understanding and agreed to proceed.  INCREASE prozac 10 to 20 mg qam. Risks, benefits, alternatives discussed with patient including GI upset, nausea vomiting diarrhea, theoretical decrease of seizure threshold predisposing the patient  to a slightly higher seizure risk, headaches, sexual dysfunction, serotonin syndrome, bleeding risk, increased suicidality in patients 24 years and younger, switching to shweta/hypomania.  After discussion of these risks and benefits, the patient voiced understanding and agreed to proceed.  S/P:  paxil 10 mg qam. Ineffective 6/24  Labs: none    Patient screened positive for depression based on a PHQ-9 score of  on . Follow-up recommendations include: Prescribed antidepressant medication treatment and Suicide Risk Assessment performed.           TREATMENT PLAN/GOALS: Continue supportive psychotherapy efforts and medications as indicated. Treatment and medication options discussed during today's visit. Patient acknowledged and verbally consented to continue with current treatment plan and was educated on the importance of compliance with treatment and follow-up appointments.    MEDICATION ISSUES:  REENA reviewed as expected.  Discussed medication options and treatment plan of prescribed medication as well as the risks, benefits, and side effects including potential falls, possible impaired driving and metabolic adversities among others. Patient is agreeable to call the office with any worsening of symptoms or onset of side effects. Patient is agreeable to call 911 or go to the nearest ER should he/she begin having SI/HI. No medication side effects or related complaints today.     MEDS ORDERED DURING VISIT:  New Medications Ordered This Visit   Medications    FLUoxetine (PROzac) 20 MG capsule     Sig: Take 1 capsule by mouth Daily.     Dispense:  90 capsule     Refill:  1     Replaces 10 mg dose. Thank you for the help. Please call with questions: [phone].    mirtazapine (REMERON) 30 MG tablet     Sig: Take 1 tablet by mouth Every Night.     Dispense:  90 tablet     Refill:  1     Replaces 22.5 mg dose. Thank you for the help. Please call with questions: [phone].       Return in about 5 weeks (around  1/16/2025).         This document has been electronically signed by Teddy Montes MD  December 12, 2024 13:35 EST    Dictated Utilizing Dragon Dictation: Part of this note may be an electronic transcription/translation of spoken language to printed text using the Dragon Dictation System.

## 2024-12-13 PROBLEM — R97.20 ELEVATED PSA: Status: ACTIVE | Noted: 2024-12-13

## 2024-12-13 PROBLEM — L90.0 LICHEN SCLEROSUS: Status: ACTIVE | Noted: 2024-12-13

## 2024-12-13 NOTE — PROGRESS NOTES
Chief Complaint: Urologic complaint    Subjective         History of Present Illness  Reji Mcmillan is a 79 y.o. male         Elevated PSA with PI - RADS 3 lesion  ED  BPH  Lichen sclerosus  Peyronie's disease        10/24 UA - negative      On Flomax 0.4 QD -helping.  Voiding okay, no straining  Nocturia x 1.      Tadalafil 5 mg daily -working somewhat, patient that worried currently secondary to his wife having medical issues dementia/Alzheimer's      Peyronie's disease - did Xiaflex x 4 injections with improvement      Lichen sclerosis -was prescribed betamethasone cream, never got this,      Wife with [place]'s       9/24 1.5, GFR 45 -baseline        PSA    10/24 MRI prostate-86 g, PSAd 0.06  PI - RADS 3 - 0.5 cm right peripheral zone prostatic apex.  9/24      4.7  8/23      3.9  1/23      5.5  11/22    16.7  10/22    4.9  2/22     3.7     Objective         Normal circumcised phallus.  Mild lichen sclerosus but minor, no meatal stenosis              Assessment and Plan    Diagnoses and all orders for this visit:    1. Erectile dysfunction due to arterial insufficiency (Primary)    2. Elevated PSA    3. Lichen sclerosus           Elevated PSA with PI - RADS 3 lesion      PSA density within normal limits, patient does have a PI - RADS 3 lesion that is very small and after discussion of risk benefits we will monitor this conservatively.    8/25 with PSA and MRI prostate    Patient understands we are ruling out prostate cancer he must follow-up          BPH      Continue Flomax 0.4 mg daily -prescription      ED    Continue tadalafil 5 mg daily - likes to get this to [facility] to get the cheaper.      Lichen sclerosus    Doing okay, he is going to monitor conservatively

## 2024-12-16 ENCOUNTER — OFFICE VISIT (OUTPATIENT)
Dept: UROLOGY | Age: 79
End: 2024-12-16
Payer: MEDICARE

## 2024-12-16 VITALS — WEIGHT: 192.4 LBS | BODY MASS INDEX: 27.54 KG/M2 | HEIGHT: 70 IN

## 2024-12-16 DIAGNOSIS — N40.3 PROSTATIC NODULE WITH OBSTRUCTION: ICD-10-CM

## 2024-12-16 DIAGNOSIS — N13.8 PROSTATIC NODULE WITH OBSTRUCTION: ICD-10-CM

## 2024-12-16 DIAGNOSIS — N48.6 PEYRONIE'S DISEASE: ICD-10-CM

## 2024-12-16 DIAGNOSIS — L90.0 LICHEN SCLEROSUS: ICD-10-CM

## 2024-12-16 DIAGNOSIS — N52.01 ERECTILE DYSFUNCTION DUE TO ARTERIAL INSUFFICIENCY: Primary | ICD-10-CM

## 2024-12-16 DIAGNOSIS — N40.1 BENIGN PROSTATIC HYPERPLASIA WITH LOWER URINARY TRACT SYMPTOMS, SYMPTOM DETAILS UNSPECIFIED: ICD-10-CM

## 2024-12-16 DIAGNOSIS — R97.20 ELEVATED PSA: ICD-10-CM

## 2024-12-16 PROCEDURE — 1160F RVW MEDS BY RX/DR IN RCRD: CPT | Performed by: UROLOGY

## 2024-12-16 PROCEDURE — 1159F MED LIST DOCD IN RCRD: CPT | Performed by: UROLOGY

## 2024-12-16 PROCEDURE — 99214 OFFICE O/P EST MOD 30 MIN: CPT | Performed by: UROLOGY

## 2024-12-16 RX ORDER — TADALAFIL 5 MG/1
5 TABLET ORAL DAILY PRN
Qty: 90 TABLET | Refills: 1 | Status: SHIPPED | OUTPATIENT
Start: 2024-12-16 | End: 2025-06-14

## 2024-12-16 RX ORDER — TAMSULOSIN HYDROCHLORIDE 0.4 MG/1
1 CAPSULE ORAL DAILY
Qty: 90 CAPSULE | Refills: 1 | Status: SHIPPED | OUTPATIENT
Start: 2024-12-16 | End: 2025-06-14

## 2025-04-14 DIAGNOSIS — I10 ESSENTIAL HYPERTENSION: ICD-10-CM

## 2025-04-14 RX ORDER — LANSOPRAZOLE 30 MG/1
30 CAPSULE, DELAYED RELEASE ORAL DAILY
Qty: 90 CAPSULE | Refills: 3 | Status: SHIPPED | OUTPATIENT
Start: 2025-04-14

## 2025-04-15 ENCOUNTER — TRANSCRIBE ORDERS (OUTPATIENT)
Dept: LAB | Facility: HOSPITAL | Age: 80
End: 2025-04-15
Payer: MEDICARE

## 2025-04-15 ENCOUNTER — LAB (OUTPATIENT)
Dept: LAB | Facility: HOSPITAL | Age: 80
End: 2025-04-15
Payer: MEDICARE

## 2025-04-15 DIAGNOSIS — D50.9 IRON DEFICIENCY ANEMIA, UNSPECIFIED IRON DEFICIENCY ANEMIA TYPE: ICD-10-CM

## 2025-04-15 DIAGNOSIS — N18.32 CHRONIC KIDNEY DISEASE (CKD) STAGE G3B/A1, MODERATELY DECREASED GLOMERULAR FILTRATION RATE (GFR) BETWEEN 30-44 ML/MIN/1.73 SQUARE METER AND ALBUMINURIA CREATININE RATIO LESS THAN 30 MG/G (CMS/H*: Primary | ICD-10-CM

## 2025-04-15 DIAGNOSIS — E55.9 VITAMIN D DEFICIENCY: ICD-10-CM

## 2025-04-15 DIAGNOSIS — E03.9 HYPOTHYROIDISM, UNSPECIFIED TYPE: Chronic | ICD-10-CM

## 2025-04-15 DIAGNOSIS — R73.01 IMPAIRED FASTING GLUCOSE: ICD-10-CM

## 2025-04-15 DIAGNOSIS — I10 ESSENTIAL HYPERTENSION: Chronic | ICD-10-CM

## 2025-04-15 DIAGNOSIS — N18.32 CHRONIC KIDNEY DISEASE (CKD) STAGE G3B/A1, MODERATELY DECREASED GLOMERULAR FILTRATION RATE (GFR) BETWEEN 30-44 ML/MIN/1.73 SQUARE METER AND ALBUMINURIA CREATININE RATIO LESS THAN 30 MG/G (CMS/H*: ICD-10-CM

## 2025-04-15 DIAGNOSIS — E78.2 MIXED HYPERLIPIDEMIA: Chronic | ICD-10-CM

## 2025-04-15 LAB
25(OH)D3 SERPL-MCNC: 24.4 NG/ML (ref 30–100)
ALBUMIN SERPL-MCNC: 3.9 G/DL (ref 3.5–5.2)
ALBUMIN/GLOB SERPL: 1.2 G/DL
ALP SERPL-CCNC: 58 U/L (ref 39–117)
ALT SERPL W P-5'-P-CCNC: 9 U/L (ref 1–41)
ANION GAP SERPL CALCULATED.3IONS-SCNC: 13.6 MMOL/L (ref 5–15)
AST SERPL-CCNC: 15 U/L (ref 1–40)
BACTERIA UR QL AUTO: ABNORMAL /HPF
BASOPHILS # BLD AUTO: 0.01 10*3/MM3 (ref 0–0.2)
BASOPHILS NFR BLD AUTO: 0.2 % (ref 0–1.5)
BILIRUB SERPL-MCNC: 0.3 MG/DL (ref 0–1.2)
BILIRUB UR QL STRIP: NEGATIVE
BUN SERPL-MCNC: 14 MG/DL (ref 8–23)
BUN/CREAT SERPL: 9.9 (ref 7–25)
CALCIUM SPEC-SCNC: 9 MG/DL (ref 8.6–10.5)
CHLORIDE SERPL-SCNC: 101 MMOL/L (ref 98–107)
CHOLEST SERPL-MCNC: 129 MG/DL (ref 0–200)
CLARITY UR: CLEAR
CO2 SERPL-SCNC: 22.4 MMOL/L (ref 22–29)
COLOR UR: YELLOW
CREAT SERPL-MCNC: 1.41 MG/DL (ref 0.76–1.27)
CREAT UR-MCNC: 147.6 MG/DL
DEPRECATED RDW RBC AUTO: 38.8 FL (ref 37–54)
EGFRCR SERPLBLD CKD-EPI 2021: 51 ML/MIN/1.73
EOSINOPHIL # BLD AUTO: 0.08 10*3/MM3 (ref 0–0.4)
EOSINOPHIL NFR BLD AUTO: 1.5 % (ref 0.3–6.2)
ERYTHROCYTE [DISTWIDTH] IN BLOOD BY AUTOMATED COUNT: 14.3 % (ref 12.3–15.4)
FERRITIN SERPL-MCNC: 14.2 NG/ML (ref 30–400)
GLOBULIN UR ELPH-MCNC: 3.3 GM/DL
GLUCOSE SERPL-MCNC: 91 MG/DL (ref 65–99)
GLUCOSE UR STRIP-MCNC: NEGATIVE MG/DL
HBA1C MFR BLD: 5.4 % (ref 4.8–5.6)
HCT VFR BLD AUTO: 32.3 % (ref 37.5–51)
HDLC SERPL-MCNC: 50 MG/DL (ref 40–60)
HGB BLD-MCNC: 10.2 G/DL (ref 13–17.7)
HGB UR QL STRIP.AUTO: NEGATIVE
HYALINE CASTS UR QL AUTO: ABNORMAL /LPF
IMM GRANULOCYTES # BLD AUTO: 0.02 10*3/MM3 (ref 0–0.05)
IMM GRANULOCYTES NFR BLD AUTO: 0.4 % (ref 0–0.5)
IRON 24H UR-MRATE: 24 MCG/DL (ref 59–158)
IRON SATN MFR SERPL: 5 % (ref 20–50)
KETONES UR QL STRIP: NEGATIVE
LDLC SERPL CALC-MCNC: 67 MG/DL (ref 0–100)
LDLC/HDLC SERPL: 1.35 {RATIO}
LEUKOCYTE ESTERASE UR QL STRIP.AUTO: ABNORMAL
LYMPHOCYTES # BLD AUTO: 1.01 10*3/MM3 (ref 0.7–3.1)
LYMPHOCYTES NFR BLD AUTO: 18.4 % (ref 19.6–45.3)
MCH RBC QN AUTO: 23.9 PG (ref 26.6–33)
MCHC RBC AUTO-ENTMCNC: 31.6 G/DL (ref 31.5–35.7)
MCV RBC AUTO: 75.6 FL (ref 79–97)
MONOCYTES # BLD AUTO: 0.5 10*3/MM3 (ref 0.1–0.9)
MONOCYTES NFR BLD AUTO: 9.1 % (ref 5–12)
NEUTROPHILS NFR BLD AUTO: 3.86 10*3/MM3 (ref 1.7–7)
NEUTROPHILS NFR BLD AUTO: 70.4 % (ref 42.7–76)
NITRITE UR QL STRIP: NEGATIVE
NRBC BLD AUTO-RTO: 0 /100 WBC (ref 0–0.2)
PH UR STRIP.AUTO: 6 [PH] (ref 5–8)
PHOSPHATE SERPL-MCNC: 3.4 MG/DL (ref 2.5–4.5)
PLATELET # BLD AUTO: 295 10*3/MM3 (ref 140–450)
PMV BLD AUTO: 10.8 FL (ref 6–12)
POTASSIUM SERPL-SCNC: 4.4 MMOL/L (ref 3.5–5.2)
PROT ?TM UR-MCNC: 16 MG/DL
PROT SERPL-MCNC: 7.2 G/DL (ref 6–8.5)
PROT UR QL STRIP: ABNORMAL
PROT/CREAT UR: 0.11 MG/G{CREAT}
RBC # BLD AUTO: 4.27 10*6/MM3 (ref 4.14–5.8)
RBC # UR STRIP: ABNORMAL /HPF
REF LAB TEST METHOD: ABNORMAL
SODIUM SERPL-SCNC: 137 MMOL/L (ref 136–145)
SP GR UR STRIP: 1.02 (ref 1–1.03)
SPERM URNS QL MICRO: PRESENT /HPF
SQUAMOUS #/AREA URNS HPF: ABNORMAL /HPF
T4 FREE SERPL-MCNC: 1.23 NG/DL (ref 0.92–1.68)
TIBC SERPL-MCNC: 444 MCG/DL (ref 298–536)
TRANSFERRIN SERPL-MCNC: 298 MG/DL (ref 200–360)
TRIGL SERPL-MCNC: 57 MG/DL (ref 0–150)
TSH SERPL DL<=0.05 MIU/L-ACNC: 3.05 UIU/ML (ref 0.27–4.2)
UROBILINOGEN UR QL STRIP: ABNORMAL
VLDLC SERPL-MCNC: 12 MG/DL (ref 5–40)
WBC # UR STRIP: ABNORMAL /HPF
WBC NRBC COR # BLD AUTO: 5.48 10*3/MM3 (ref 3.4–10.8)

## 2025-04-15 PROCEDURE — 82306 VITAMIN D 25 HYDROXY: CPT

## 2025-04-15 PROCEDURE — 83540 ASSAY OF IRON: CPT

## 2025-04-15 PROCEDURE — 84439 ASSAY OF FREE THYROXINE: CPT

## 2025-04-15 PROCEDURE — 82728 ASSAY OF FERRITIN: CPT

## 2025-04-15 PROCEDURE — 85025 COMPLETE CBC W/AUTO DIFF WBC: CPT

## 2025-04-15 PROCEDURE — 36415 COLL VENOUS BLD VENIPUNCTURE: CPT

## 2025-04-15 PROCEDURE — 84100 ASSAY OF PHOSPHORUS: CPT

## 2025-04-15 PROCEDURE — 84466 ASSAY OF TRANSFERRIN: CPT

## 2025-04-15 PROCEDURE — 81001 URINALYSIS AUTO W/SCOPE: CPT

## 2025-04-15 PROCEDURE — 83036 HEMOGLOBIN GLYCOSYLATED A1C: CPT

## 2025-04-15 PROCEDURE — 84443 ASSAY THYROID STIM HORMONE: CPT

## 2025-04-15 PROCEDURE — 80061 LIPID PANEL: CPT

## 2025-04-15 PROCEDURE — 80053 COMPREHEN METABOLIC PANEL: CPT

## 2025-04-15 PROCEDURE — 82570 ASSAY OF URINE CREATININE: CPT

## 2025-04-15 PROCEDURE — 84156 ASSAY OF PROTEIN URINE: CPT

## 2025-04-15 RX ORDER — LOSARTAN POTASSIUM 25 MG/1
25 TABLET ORAL DAILY
Qty: 90 TABLET | Refills: 3 | OUTPATIENT
Start: 2025-04-15

## 2025-04-16 ENCOUNTER — RESULTS FOLLOW-UP (OUTPATIENT)
Dept: LAB | Facility: HOSPITAL | Age: 80
End: 2025-04-16
Payer: MEDICARE

## 2025-04-16 DIAGNOSIS — D50.9 IRON DEFICIENCY ANEMIA, UNSPECIFIED IRON DEFICIENCY ANEMIA TYPE: Primary | ICD-10-CM

## 2025-04-17 NOTE — TELEPHONE ENCOUNTER
"Relay     \"Please call the patient and advise of abnormal results. Iron is low and he is anemic. I advise to take an iron tablet twice a day and see hematology. Referral placed.     Vitamin D level is low. I recommend he discuss this with Dr. Pedersen to see if supplement will be ok to take and if so start taking over the counter supplement.\"                "

## 2025-04-22 ENCOUNTER — OFFICE VISIT (OUTPATIENT)
Dept: PSYCHIATRY | Facility: CLINIC | Age: 80
End: 2025-04-22
Payer: MEDICARE

## 2025-04-22 VITALS
HEIGHT: 70 IN | DIASTOLIC BLOOD PRESSURE: 58 MMHG | WEIGHT: 187 LBS | HEART RATE: 61 BPM | BODY MASS INDEX: 26.77 KG/M2 | SYSTOLIC BLOOD PRESSURE: 126 MMHG

## 2025-04-22 DIAGNOSIS — F51.05 INSOMNIA DUE TO MENTAL CONDITION: ICD-10-CM

## 2025-04-22 DIAGNOSIS — Z63.4 BEREAVEMENT: Primary | ICD-10-CM

## 2025-04-22 DIAGNOSIS — F41.1 GENERALIZED ANXIETY DISORDER: ICD-10-CM

## 2025-04-22 DIAGNOSIS — F33.1 MAJOR DEPRESSIVE DISORDER, RECURRENT EPISODE, MODERATE: ICD-10-CM

## 2025-04-22 RX ORDER — AMOXICILLIN 500 MG/1
CAPSULE ORAL
COMMUNITY
Start: 2025-04-01 | End: 2025-04-22

## 2025-04-22 RX ORDER — TRAZODONE HYDROCHLORIDE 50 MG/1
50 TABLET ORAL NIGHTLY
Qty: 30 TABLET | Refills: 2 | Status: SHIPPED | OUTPATIENT
Start: 2025-04-22

## 2025-04-22 SDOH — SOCIAL STABILITY - SOCIAL INSECURITY: DISSAPEARANCE AND DEATH OF FAMILY MEMBER: Z63.4

## 2025-04-22 NOTE — PROGRESS NOTES
"Subjective   Reji Mcmillan is a 79 y.o. male who presents today for initial evaluation     Referring Provider:  No referring provider defined for this encounter.    Chief Complaint:  depression    History of Present Illness:       Chart Review By Dates:  2025: nephro, urol; low ferritin, reassuring thyroid studies normal CMP creatinine 1.41, reassuring lipids, abnormal iron profile, abnormal UA micro, low vitamin D, reassuring A1c 5.4.  2024: cards, int med, urology; tr protein on UA.  EK, sinus, inferior infarct, old.  QTc 438  2024: nephro, urol, abnl UA; high PSA; reassuring thyroid studies; abnl cmp cr 1.54 gluc 106.  2024: no visits.  2024: cr 1.60, eGFR 44. Doubled mirtazapine dose.  7/15/24: no visits.    Plannin2024: MDD, KIMBERLY, increase prozac and mirtazapine. Going to get 2 nurses to help him, which should lighten the burden/mood. Consider adding trazodone.  2024: Stable, well, no med changes. Plans to do more reading before bed (30 min before).   2024: Improved, possibly stable.     VISITS/APPOINTMENTS (BELOW):    \"Reji\"   Stage IIIb chronic kidney disease  Hx open heart surgery, 2x bypass. .  3/6/25, his wife passed    2025:   In person interview:  \"She passed in early March.\"  Grieved his wife  She passed at home, I was the only one there  I keep looking for her, but she's not there  There was no cure, so nothing we could do  Hospice did a wonderful job  \"I'm dealing with it I'm doing ok\"  Stopped mirtazapine due to WG  MDD: stable  Grief: his wife  KIMBERLY: stable  Panic attacks: stable  Energy: down  Concentration: stable  Insomnia: initial and maintenance  Eating/Weight: 187, 192, 190, 193, 184  Refills: yes  Substances: denies  Therapy: none  Medication compliant: yes  SE: none  No SI HI AVH.       2024:   In person interview:  \"I'm surviving, but not very well.\"  Discussed his wife. She's gone into real dementia. Aggressive. " "Nicole. She's now on rexulti.  Depressed mood, anxioius  \"I've been ok\"  Looking into getting help to take care of his wife, ongoing. Has had a SW look into it, they don't qualify for medicare (make too much money).  MDD: depressed mood  KIMBERLY: excessive worrying, on edge  Panic attacks: stable  Energy: down  Concentration: stable  Insomnia: initial and maintenance 2/2 Wife plus mental health  Eating/Weight: 192, 190, 193, 184  Refills: yes  Substances: denies  Therapy: none  Medication compliant: yes  SE: none  No SI HI AVH.       11/01/2024:   In person interview:  \"It's been a busy week.\"  Discussed his wife  \"I've been ok\"  Looking into getting help to take care of his wife, ongoing. Has had a SW look into it, they don't qualify for medicare (make too much money).  MDD: possibly stable  KIMBERLY: stable, but worried about his wife.  Panic attacks: stable  Energy: stable  Concentration: stable  Insomnia: stable  Eating/Weight: 192, 190, 193, 184  Refills: yes  Substances: denies  Therapy: none  Medication compliant: yes  SE: none  No SI HI AVH.       09/23/2024:   In person interview:  \"I think I'm improving... it's my wife.\"  I'm learning not to be angry with her  Discussed wife  I've been looking into getting help  MDD: possibly stable  KIMBERLY: possibly stable  Panic attacks: stable  Energy: stable  Concentration: stable  Insomnia: stable  Eating/Weight: 190, 193, 184  Refills: yes  Substances: denies  Therapy: none  Medication compliant: yes  SE: none  No SI HI AVH.       08/19/2024:   In person interview:  \"I'm still dealing with my wife's situation.\"  Discussed anger  Takes all day to get her out of bed  She doesn't talk like she used to  Taking three mirtazapine helped with insomnia, took 30 minutes to sleep  MDD: Improving mood  KIMBERLY: improving  Panic attacks: stable  Energy: improving  Concentration: improving  Insomnia: initial, improving  Eating/Weight: 193, 184  Refills: y  Substances: denies  Therapy: " "none  Medication compliant: y  SE: n  No SI HI AVH.       07/15/2024: In person interview:  \"Well, I didn't know what to expect.\"  One of my friends noticed I'm a little more talkative.  Not dreading to do stuff.  I'd like something else for sleep. I don't want to be on narcotics.  MDD: Improving mood  KIMBERLY: improving  Panic attacks: n  Energy: improving  Concentration: improving  Insomnia: stable on ambien, but vivid dreams  Eating/Weight: 184  Refills: y  Substances: denies  Therapy: n  Medication compliant: y  SE: n  No SI HI AVH.    ...    06/12/2024: INITIAL VISIT Chart review:     Dewey: Ambien CR 12.5 mg nightly chronically  Care Everywhere: Several non behavioral health notes pertaining to chronic kidney disease    Psychotropic medication chart review:  Present:  Ambien CR 12.5 mg at night    Previously:  Paxil 10 mg a day    EKG: November 2023: 55, sinus, QTc 419.  Procedures: none  Head imaging: none  Labs: April 2024: Creatinine 1.5, glucose 103, calcium 7.6, otherwise reassuring CMP.  Reassuring A1c, thyroid studies, lipids, vitamin D, CBC.  Total saturation  Iron profile is slightly low at 18.  UDS entirely negative in April 2023.  Initial Chart Review Notes: Seen by primary care in April.  PHQ-9 is a 4.  Seen as a follow-up for anxiety, insomnia.  Sleeping well on Ambien.  Having some fatigue.  Referred to us for anxiety and depression.    06/12/2024: In person.  Interview:  His/Her Story: \"Ambien helps, but it doesn't hold me down.\"  P14, G7  Racing thoughts at night  Friends have said I've changed, I'm more \"low\" and not as enthusiastic as I used to be  Has been on paxil for more than 3 years   54 years, wife has dementia, additional stressor  She's losing function  Hx open heart surgery, 2x bypass.  Depression/Mood:  Depressed mood, anhedonia,   poor energy, poor concentration,   insomnia.  Seasonal pattern: def  Severity: Moderate  Duration: Many years  Anxiety:  Uncontrolled worrying, " muscle tension, fatigue, poor concentration, feeling on edge  irritability, insomnia.  Severity: Moderate  Duration: Many years  Panic attacks: n  Psych ROS: Positive for depression, anxiety.  Negative for psychosis and shweta.  ADHD: def  PTSD: def  No SI HI AVH.  Medication compliant: y    Access to Firearms:  yes, locked away    PHQ-9 Depression Screening  PHQ-9 Total Score:      Little interest or pleasure in doing things?     Feeling down, depressed, or hopeless?     Trouble falling or staying asleep, or sleeping too much?     Feeling tired or having little energy?     Poor appetite or overeating?     Feeling bad about yourself - or that you are a failure or have let yourself or your family down?     Trouble concentrating on things, such as reading the newspaper or watching television?     Moving or speaking so slowly that other people could have noticed? Or the opposite - being so fidgety or restless that you have been moving around a lot more than usual?     Thoughts that you would be better off dead, or of hurting yourself in some way?     PHQ-9 Total Score       KIMBERLY-7  Feeling nervous, anxious or on edge: Not at all  Not being able to stop or control worrying: Not at all  Worrying too much about different things: Not at all  Trouble Relaxing: Not at all  Being so restless that it is hard to sit still: Not at all  Feeling afraid as if something awful might happen: Not at all  Becoming easily annoyed or irritable: Not at all  KIMBERLY 7 Total Score: 0  If you checked any problems, how difficult have these problems made it for you to do your work, take care of things at home, or get along with other people: Not difficult at all    Past Surgical History:  Past Surgical History:   Procedure Laterality Date    CARDIAC SURGERY      CHOLECYSTECTOMY      COLONOSCOPY      COLONOSCOPY N/A 3/20/2023    Procedure: COLONOSCOPY;  Surgeon: Jacques Dc MD;  Location: [facility] ENDOSCOPY;  Service: General;  Laterality: N/A;   diverticulosis, hemorrhoids    CORONARY ARTERY BYPASS GRAFT      cabbage x2    DENTAL PROCEDURE      HERNIA REPAIR      TOE SURGERY Left        Problem List:  Patient Active Problem List   Diagnosis    Essential hypertension    Mixed hyperlipidemia    Hypothyroidism    Vitamin D deficiency    Anxiety and depression    Insomnia    Anemia    Chronic low back pain    Nodular hyperplasia of prostate gland    Elevated prostate specific antigen (PSA)    Disorder of arteries and arterioles, unspecified    Stage 3 chronic kidney disease    CAD s/p CABG    Peyronie disease    Vasculogenic erectile dysfunction    History of elevated PSA    Screening for malignant neoplasm of colon    History of colonic polyps    Other male erectile dysfunction    Impaired fasting glucose    Elevated PSA    Lichen sclerosus    Benign prostatic hyperplasia with lower urinary tract symptoms       Allergy:   Allergies   Allergen Reactions    Sulfa Antibiotics Unknown - Low Severity     Flu like symptoms caused from taking medication        Discontinued Medications:  Medications Discontinued During This Encounter   Medication Reason    amoxicillin (AMOXIL) 500 MG capsule *Therapy completed    mirtazapine (REMERON) 30 MG tablet Side effects    FLUoxetine (PROzac) 20 MG capsule Reorder                 Current Medications:   Current Outpatient Medications   Medication Sig Dispense Refill    amLODIPine (NORVASC) 5 MG tablet TAKE 1 TABLET DAILY 90 tablet 3    aspirin 81 MG EC tablet Take 1 tablet by mouth Daily.      atorvastatin (LIPITOR) 80 MG tablet TAKE 1 TABLET DAILY 90 tablet 3    cetirizine (zyrTEC) 10 MG tablet Take 1 tablet by mouth Daily.      Dupilumab (Dupixent) 300 MG/2ML solution pen-injector Inject 1 mL under the skin into the appropriate area as directed Every 14 (Fourteen) Days.      FLUoxetine (PROzac) 20 MG capsule Take 1 capsule by mouth Daily. 90 capsule 1    ketoconazole (NIZORAL) 2 % cream Apply 1 application  topically to the  appropriate area as directed Daily. 30 g 1    lansoprazole (PREVACID) 30 MG capsule TAKE 1 CAPSULE DAILY 90 capsule 3    levothyroxine (SYNTHROID, LEVOTHROID) 25 MCG tablet TAKE 1 TABLET DAILY 90 tablet 3    losartan (COZAAR) 50 MG tablet Take 1 tablet by mouth Daily.      multivitamin with minerals tablet tablet Take 1 tablet by mouth Daily.      ranolazine (RANEXA) 500 MG 12 hr tablet Take 1 tablet by mouth 2 (Two) Times a Day. 180 tablet 3    tadalafil (CIALIS) 5 MG tablet Take 1 tablet by mouth Daily As Needed for Erectile Dysfunction for up to 180 days. 90 tablet 1    tamsulosin (FLOMAX) 0.4 MG capsule 24 hr capsule Take 1 capsule by mouth Daily for 180 days. 90 capsule 1    traZODone (DESYREL) 50 MG tablet Take 1 tablet by mouth Every Night. 30 tablet 2     No current facility-administered medications for this visit.       Past Medical History:  Past Medical History:   Diagnosis Date    Allergic rhinitis due to allergen     Benign prostatic hyperplasia     CAD s/p CABG 03/30/2022    Chest pain     Chronic allergic rhinitis     CKD (chronic kidney disease) stage 3, GFR 30-59 ml/min     Eczema     Erectile dysfunction 10/19/2020    GERD (gastroesophageal reflux disease)     Heart disease     High blood pressure     High cholesterol     Hypercholesteremia     Hypertension     Controlled    Hypothyroidism     Orthostatic hypotension 3/17/2022    Polio     Prostate disorder        Past Psychiatric History:  Began Treatment: PCP for years  Diagnoses: MDD  Psychiatrist:Denies  Therapist:Denies  Admission History:Denies  Medication Trials:    Paxil only, used to work    Self Harm: Denies  Suicide Attempts:Denies      Substance Abuse History:   Types: 1 airplane bottle 4x/wk  Withdrawal Symptoms:Denies  Longest Period Sober:Not Applicable   AA: Not applicable     Social History:  Martial Status:  Employed:No. 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    Drug use: Never    Sexual activity: Defer       Family History:   Suicide Attempts: Denies  Suicide Completions:Denies      Family History   Problem Relation Age of Onset    Dementia Mother     Anxiety disorder Mother     Hyperlipidemia Mother     Heart disease Mother     Hypertension Mother     Hyperlipidemia Father     Hypertension Father     Heart attack Father     Depression Sister     Anxiety disorder Sister     No Known Problems Brother     No Known Problems Maternal Aunt     No Known Problems Paternal Aunt     No Known Problems Maternal Uncle     No Known Problems Paternal Uncle     No Known Problems Maternal Grandfather     No Known Problems Maternal Grandmother     No Known Problems Paternal Grandfather     No Known Problems Paternal Grandmother     No Known Problems Cousin     No Known Problems Other     Malig Hyperthermia Neg Hx     ADD / ADHD Neg Hx     Alcohol abuse Neg Hx     Bipolar disorder Neg Hx     Drug abuse Neg Hx     OCD Neg Hx     Paranoid behavior Neg Hx     Schizophrenia Neg Hx     Seizures Neg Hx     Self-Injurious Behavior  Neg Hx     Suicide Attempts Neg Hx        Developmental History:       Childhood: Denies Abuse  High School:Completed  College: 4 years, education degree    Mental Status Exam  Appearance  : groomed, good eye contact, normal street clothes  Behavior  : pleasant and cooperative  Motor  : No abnormal  Speech  :normal rhythm, rate, volume, tone, not hyperverbal, not pressured, normal prosidy  Mood  : \"I'm ok, just can't sleep\"  Affect  : nearly euthymic, mood congruent, good variability  Thought Content  : negative suicidal ideations, negative homicidal " "ideations, negative obsessions  Perceptions  : negative auditory hallucinations, negative visual hallucinations  Thought Process  : linear  Insight/Judgement  : Fair/fair  Cognition  : grossly intact  Attention   : intact      Review of Systems:  Review of Systems   Constitutional:  Positive for activity change, diaphoresis, fatigue and unexpected weight change.   HENT:  Negative for drooling.    Eyes:  Negative for visual disturbance.   Respiratory:  Negative for cough and shortness of breath.    Cardiovascular:  Positive for chest pain. Negative for palpitations and leg swelling.   Gastrointestinal:  Negative for nausea and vomiting.   Endocrine: Negative for cold intolerance and heat intolerance.   Genitourinary:  Negative for difficulty urinating.   Musculoskeletal:  Negative for joint swelling.   Allergic/Immunologic: Negative for immunocompromised state.   Neurological:  Negative for dizziness, seizures, speech difficulty and numbness.   Psychiatric/Behavioral:  Positive for agitation and sleep disturbance.        Physical Exam:  Physical Exam    Vital Signs:   /58   Pulse 61   Ht 177.8 cm (70\")   Wt 84.8 kg (187 lb)   BMI 26.83 kg/m²      Lab Results:   Lab on 04/15/2025   Component Date Value Ref Range Status    Glucose 04/15/2025 91  65 - 99 mg/dL Final    BUN 04/15/2025 14  8 - 23 mg/dL Final    Creatinine 04/15/2025 1.41 (H)  0.76 - 1.27 mg/dL Final    Sodium 04/15/2025 137  136 - 145 mmol/L Final    Potassium 04/15/2025 4.4  3.5 - 5.2 mmol/L Final    Chloride 04/15/2025 101  98 - 107 mmol/L Final    CO2 04/15/2025 22.4  22.0 - 29.0 mmol/L Final    Calcium 04/15/2025 9.0  8.6 - 10.5 mg/dL Final    Total Protein 04/15/2025 7.2  6.0 - 8.5 g/dL Final    Albumin 04/15/2025 3.9  3.5 - 5.2 g/dL Final    ALT (SGPT) 04/15/2025 9  1 - 41 U/L Final    AST (SGOT) 04/15/2025 15  1 - 40 U/L Final    Alkaline Phosphatase 04/15/2025 58  39 - 117 U/L Final    Total Bilirubin 04/15/2025 0.3  0.0 - 1.2 mg/dL " Final    Globulin 04/15/2025 3.3  gm/dL Final    A/G Ratio 04/15/2025 1.2  g/dL Final    BUN/Creatinine Ratio 04/15/2025 9.9  7.0 - 25.0 Final    Anion Gap 04/15/2025 13.6  5.0 - 15.0 mmol/L Final    eGFR 04/15/2025 51.0 (L)  >60.0 mL/min/1.73 Final    Total Cholesterol 04/15/2025 129  0 - 200 mg/dL Final    Triglycerides 04/15/2025 57  0 - 150 mg/dL Final    HDL Cholesterol 04/15/2025 50  40 - 60 mg/dL Final    LDL Cholesterol  04/15/2025 67  0 - 100 mg/dL Final    VLDL Cholesterol 04/15/2025 12  5 - 40 mg/dL Final    LDL/HDL Ratio 04/15/2025 1.35   Final    25 Hydroxy, Vitamin D 04/15/2025 24.4 (L)  30.0 - 100.0 ng/ml Final    Hemoglobin A1C 04/15/2025 5.40  4.80 - 5.60 % Final    TSH 04/15/2025 3.050  0.270 - 4.200 uIU/mL Final    Free T4 04/15/2025 1.23  0.92 - 1.68 ng/dL Final    Iron 04/15/2025 24 (L)  59 - 158 mcg/dL Final    Iron Saturation (TSAT) 04/15/2025 5 (L)  20 - 50 % Final    Transferrin 04/15/2025 298  200 - 360 mg/dL Final    TIBC 04/15/2025 444  298 - 536 mcg/dL Final    Ferritin 04/15/2025 14.20 (L)  30.00 - 400.00 ng/mL Final    WBC 04/15/2025 5.48  3.40 - 10.80 10*3/mm3 Final    RBC 04/15/2025 4.27  4.14 - 5.80 10*6/mm3 Final    Hemoglobin 04/15/2025 10.2 (L)  13.0 - 17.7 g/dL Final    Hematocrit 04/15/2025 32.3 (L)  37.5 - 51.0 % Final    MCV 04/15/2025 75.6 (L)  79.0 - 97.0 fL Final    MCH 04/15/2025 23.9 (L)  26.6 - 33.0 pg Final    MCHC 04/15/2025 31.6  31.5 - 35.7 g/dL Final    RDW 04/15/2025 14.3  12.3 - 15.4 % Final    RDW-SD 04/15/2025 38.8  37.0 - 54.0 fl Final    MPV 04/15/2025 10.8  6.0 - 12.0 fL Final    Platelets 04/15/2025 295  140 - 450 10*3/mm3 Final    Neutrophil % 04/15/2025 70.4  42.7 - 76.0 % Final    Lymphocyte % 04/15/2025 18.4 (L)  19.6 - 45.3 % Final    Monocyte % 04/15/2025 9.1  5.0 - 12.0 % Final    Eosinophil % 04/15/2025 1.5  0.3 - 6.2 % Final    Basophil % 04/15/2025 0.2  0.0 - 1.5 % Final    Immature Grans % 04/15/2025 0.4  0.0 - 0.5 % Final    Neutrophils,  Absolute 04/15/2025 3.86  1.70 - 7.00 10*3/mm3 Final    Lymphocytes, Absolute 04/15/2025 1.01  0.70 - 3.10 10*3/mm3 Final    Monocytes, Absolute 04/15/2025 0.50  0.10 - 0.90 10*3/mm3 Final    Eosinophils, Absolute 04/15/2025 0.08  0.00 - 0.40 10*3/mm3 Final    Basophils, Absolute 04/15/2025 0.01  0.00 - 0.20 10*3/mm3 Final    Immature Grans, Absolute 04/15/2025 0.02  0.00 - 0.05 10*3/mm3 Final    nRBC 04/15/2025 0.0  0.0 - 0.2 /100 WBC Final    Phosphorus 04/15/2025 3.4  2.5 - 4.5 mg/dL Final    Creatinine, Urine 04/15/2025 147.6  mg/dL Final    Total Protein, Urine 04/15/2025 16.0  mg/dL Final    Protein/Creatinine Ratio, Urine 04/15/2025 0.11   Final    Color, UA 04/15/2025 Yellow  Yellow, Straw Final    Appearance, UA 04/15/2025 Clear  Clear Final    pH, UA 04/15/2025 6.0  5.0 - 8.0 Final    Specific Gravity, UA 04/15/2025 1.019  1.005 - 1.030 Final    Glucose, UA 04/15/2025 Negative  Negative Final    Ketones, UA 04/15/2025 Negative  Negative Final    Bilirubin, UA 04/15/2025 Negative  Negative Final    Blood, UA 04/15/2025 Negative  Negative Final    Protein, UA 04/15/2025 Trace (A)  Negative Final    Leuk Esterase, UA 04/15/2025 Trace (A)  Negative Final    Nitrite, UA 04/15/2025 Negative  Negative Final    Urobilinogen, UA 04/15/2025 1.0 E.U./dL  0.2 - 1.0 E.U./dL Final    RBC, UA 04/15/2025 0-2  None Seen, 0-2 /HPF Final    WBC, UA 04/15/2025 0-2  None Seen, 0-2 /HPF Final    Bacteria, UA 04/15/2025 None Seen  None Seen /HPF Final    Squamous Epithelial Cells, UA 04/15/2025 0-2  None Seen, 0-2 /HPF Final    Hyaline Casts, UA 04/15/2025 0-2  None Seen /LPF Final    Sperm, UA 04/15/2025 Present (A)  None Seen /HPF Final    Methodology 04/15/2025 Automated Microscopy   Final   Office Visit on 11/05/2024   Component Date Value Ref Range Status    Color 11/05/2024 Dark Yellow  Yellow, Straw, Dark Yellow, Juliet Final    Clarity, UA 11/05/2024 Clear  Clear Final    Specific Gravity  11/05/2024 1.030  1.005 - 1.030  Final    pH, Urine 11/05/2024 5.5  5.0 - 8.0 Final    Leukocytes 11/05/2024 Negative  Negative Final    Nitrite, UA 11/05/2024 Negative  Negative Final    Protein, POC 11/05/2024 Trace (A)  Negative mg/dL Final    Glucose, UA 11/05/2024 Negative  Negative mg/dL Final    Ketones, UA 11/05/2024 Negative  Negative Final    Urobilinogen, UA 11/05/2024 0.2 E.U./dL  Normal, 0.2 E.U./dL Final    Bilirubin 11/05/2024 Negative  Negative Final    Blood, UA 11/05/2024 Negative  Negative Final    Lot Number 11/05/2024 [id number]   Final    Expiration Date 11/05/2024 [id number]   Final       EKG Results:  No orders to display       Imaging Results:  No Images in the past 120 days found..      Assessment & Plan   Diagnoses and all orders for this visit:    1. Bereavement (Primary)    2. Major depressive disorder, recurrent episode, moderate  -     FLUoxetine (PROzac) 20 MG capsule; Take 1 capsule by mouth Daily.  Dispense: 90 capsule; Refill: 1    3. Generalized anxiety disorder  -     FLUoxetine (PROzac) 20 MG capsule; Take 1 capsule by mouth Daily.  Dispense: 90 capsule; Refill: 1    4. Insomnia due to mental condition  -     traZODone (DESYREL) 50 MG tablet; Take 1 tablet by mouth Every Night.  Dispense: 30 tablet; Refill: 2  -     FLUoxetine (PROzac) 20 MG capsule; Take 1 capsule by mouth Daily.  Dispense: 90 capsule; Refill: 1        Visit Diagnoses:    ICD-10-CM ICD-9-CM   1. Bereavement  Z63.4 V62.82   2. Major depressive disorder, recurrent episode, moderate  F33.1 296.32   3. Generalized anxiety disorder  F41.1 300.02   4. Insomnia due to mental condition  F51.05 300.9     327.02     04/22/2025: Grief, but handling well. Insomnia, start trazodone.     Acknowledged and normalized patient's thoughts, feelings, and concerns. Allowed patient to freely discuss and process issues, such as:  Anxiety and depression regarding family conflict/relationships.  Anxiety and depression regarding family's well-being.  ... using Amie  psychotherapeutic techniques including unconditional positive regard, reflective listening, and demonstrating clear empathy, with the goal of ameliorating symptoms and maintaining, restoring, or improving self-esteem, adaptive skills, and ego or psychological functions (Shae et al. 1991), the long-term goal of which is to develop a better, healthier perspective and help the patient bear their circumstances more easily.  Time (minutes) spent providing supportive psychotherapy: 16  (This time is exclusive to the therapy session and separate from the time spent on activities used to meet the criteria for the E/M service (history, exam, medical decision-making).)  Start: 3:12  Stop: 3:28  Functional status: mild impairment  Treatment plan: Medication management and supportive psychotherapy  Prognosis: good  Progress: Grief, insomnia  2m    12/12/2024: MDD, KIMBERLY, increase prozac and mirtazapine. Going to get 2 nurses to help him, which should lighten the burden/mood. Consider adding trazodone.    11/01/2024: Stable, well, no med changes. Plans to do more reading before bed (30 min before).     09/23/2024: Improved, possibly stable.     08/19/2024: Improving, increase mirtazapine. Thinking about getting help with his wife at home.    07/15/2024: Improving MDD, KIMBERLY. Wants to trial another medication besides ambien (vivid dreams, hangover after). Start mirtazapine.    6/12: Slow wean off paxil (has been on for years), start prozac (safe in kidney dz). Wife is significant stressor. 4w    PLAN:  Safety: No acute safety concerns  Therapy: None  Risk Assessment: Risk of self-harm acutely is moderate.  Risk factors include anxiety disorder, mood disorder, access to firearms, and recent psychosocial stressors (pandemic). Protective factors include no family history, no present SI, no history of suicide attempts or self-harm in the past, minimal AODA, healthcare seeking, future orientation, willingness to engage in care.  Risk  of self-harm chronically is also moderate, but could be further elevated in the event of treatment noncompliance and/or AODA.  Meds:  STOPPED mirtazapine 30 mg qhs. (WG, 4/25)  START trazodone 50 mg qhs. Risks, benefits, side effects discussed with patient including GI upset, sedation, dizziness/falls risk, grogginess the following day, prolongation of the QTc interval.  Do not use before operating vehicle, vessel, or machine. After discussion of these risks and benefits, the patient voiced understanding and agreed to proceed.    CONTINUE prozac 20 mg qam. Risks, benefits, alternatives discussed with patient including GI upset, nausea vomiting diarrhea, theoretical decrease of seizure threshold predisposing the patient to a slightly higher seizure risk, headaches, sexual dysfunction, serotonin syndrome, bleeding risk, increased suicidality in patients 24 years and younger, switching to shweta/hypomania.  After discussion of these risks and benefits, the patient voiced understanding and agreed to proceed.  S/P:  paxil 10 mg qam. Ineffective 6/24  Labs: none    Patient screened positive for depression based on a PHQ-9 score of 8 on 4/22/2025. Follow-up recommendations include: Prescribed antidepressant medication treatment and Suicide Risk Assessment performed.           TREATMENT PLAN/GOALS: Continue supportive psychotherapy efforts and medications as indicated. Treatment and medication options discussed during today's visit. Patient acknowledged and verbally consented to continue with current treatment plan and was educated on the importance of compliance with treatment and follow-up appointments.    MEDICATION ISSUES:  REENA reviewed as expected.  Discussed medication options and treatment plan of prescribed medication as well as the risks, benefits, and side effects including potential falls, possible impaired driving and metabolic adversities among others. Patient is agreeable to call the office with any  worsening of symptoms or onset of side effects. Patient is agreeable to call 911 or go to the nearest ER should he/she begin having SI/HI. No medication side effects or related complaints today.     MEDS ORDERED DURING VISIT:  New Medications Ordered This Visit   Medications    traZODone (DESYREL) 50 MG tablet     Sig: Take 1 tablet by mouth Every Night.     Dispense:  30 tablet     Refill:  2    FLUoxetine (PROzac) 20 MG capsule     Sig: Take 1 capsule by mouth Daily.     Dispense:  90 capsule     Refill:  1     Replaces 10 mg dose. Thank you for the help. Please call with questions: [phone].       Return in about 2 months (around 6/22/2025).         This document has been electronically signed by Teddy Montes MD  April 22, 2025 15:29 EDT    Dictated Utilizing Dragon Dictation: Part of this note may be an electronic transcription/translation of spoken language to printed text using the Dragon Dictation System.

## 2025-04-22 NOTE — TREATMENT PLAN
Anxiety:  5/10 progressing    Goals:  Patient will develop and implement behavioral and cognitive strategies to reduce anxiety and irrational fears, monthly, using Rogerian psychotherapy and CBT where appropriate.  Help patient explore past emotional issues in relation to present anxiety, monthly, until remission of symptoms, using Rogerian psychotherapy and CBT where appropriate.  Help patient develop an awareness of their cognitive and physical responses to anxiety, monthly, until remission of symptoms, using Rogerian psychotherapy and CBT where appropriate.          Depression:  5/10 progressing    Goals:  Patient will demonstrate the ability to initiate new constructive life skills outside of sessions on a consistent basis, monthly, using Rogerian psychotherapy and CBT where appropriate.  Assist patient in setting attainable activities of daily living goals, monthly, using Rogerian psychotherapy and CBT where appropriate.  Provide education about depression, monthly, using Rogerian psychotherapy and CBT where appropriate.  Assist patient in developing healthy coping strategies, monthly, using Rogerian psychotherapy and CBT where appropriate.    Rogerian psychotherapy and CBT will be used to help accomplish the above goals and manage depression and anxiety related to wife's passing, frustration with her worsening condition       I have discussed and reviewed this treatment plan with the patient.      Reviewed by Teddy Montes MD   04/22/2025

## 2025-04-23 NOTE — PROGRESS NOTES
Subjective   The ABCs of the Annual Wellness Visit  Medicare Wellness Visit      Reji Mcmillan is a 79 y.o. patient who presents for a Medicare Wellness Visit.    The following portions of the patient's history were reviewed and   updated as appropriate: allergies, current medications, past family history, past medical history, past social history, past surgical history, and problem list.    Compared to one year ago, the patient's physical   health is the same.  Compared to one year ago, the patient's mental   health is the same.    Recent Hospitalizations:  He was not admitted to the hospital during the last year.     Current Medical Providers:  Patient Care Team:  Kenzie Mccloud APRN as PCP - General (Nurse Practitioner)  Douglas Gutiérrez MD as Consulting Physician (Nephrology)  Jon Mata MD as Consulting Physician (Cardiology)  Teddy Montes MD as Consulting Physician (Psychiatry)  Gustabo Dawson MD as Consulting Physician (Urology)    Outpatient Medications Prior to Visit   Medication Sig Dispense Refill    amLODIPine (NORVASC) 5 MG tablet TAKE 1 TABLET DAILY 90 tablet 3    aspirin 81 MG EC tablet Take 1 tablet by mouth Daily.      atorvastatin (LIPITOR) 80 MG tablet TAKE 1 TABLET DAILY 90 tablet 3    cetirizine (zyrTEC) 10 MG tablet Take 1 tablet by mouth Daily.      Dupilumab (Dupixent) 300 MG/2ML solution pen-injector Inject 1 mL under the skin into the appropriate area as directed Every 14 (Fourteen) Days.      FLUoxetine (PROzac) 20 MG capsule Take 1 capsule by mouth Daily. 90 capsule 1    ketoconazole (NIZORAL) 2 % cream Apply 1 application  topically to the appropriate area as directed Daily. 30 g 1    lansoprazole (PREVACID) 30 MG capsule TAKE 1 CAPSULE DAILY 90 capsule 3    levothyroxine (SYNTHROID, LEVOTHROID) 25 MCG tablet TAKE 1 TABLET DAILY 90 tablet 3    losartan (COZAAR) 50 MG tablet Take 1 tablet by mouth Daily.      multivitamin with minerals tablet tablet Take 1 tablet by  mouth Daily.      ranolazine (RANEXA) 500 MG 12 hr tablet Take 1 tablet by mouth 2 (Two) Times a Day. 180 tablet 3    tadalafil (CIALIS) 5 MG tablet Take 1 tablet by mouth Daily As Needed for Erectile Dysfunction for up to 180 days. 90 tablet 1    tamsulosin (FLOMAX) 0.4 MG capsule 24 hr capsule Take 1 capsule by mouth Daily for 180 days. 90 capsule 1    traZODone (DESYREL) 50 MG tablet Take 1 tablet by mouth Every Night. 30 tablet 2     No facility-administered medications prior to visit.     No opioid medication identified on active medication list. I have reviewed chart for other potential  high risk medication/s and harmful drug interactions in the elderly.      Aspirin is on active medication list. Aspirin use is indicated based on review of current medical condition/s. Pros and cons of this therapy have been discussed today. Benefits of this medication outweigh potential harm.  Patient has been encouraged to continue taking this medication.  .      Patient Active Problem List   Diagnosis    Essential hypertension    Mixed hyperlipidemia    Hypothyroidism    Vitamin D deficiency    Anxiety and depression    Insomnia    Anemia    Chronic low back pain    Nodular hyperplasia of prostate gland    Elevated prostate specific antigen (PSA)    Disorder of arteries and arterioles, unspecified    Stage 3 chronic kidney disease    CAD s/p CABG    Peyronie disease    Vasculogenic erectile dysfunction    History of elevated PSA    Screening for malignant neoplasm of colon    History of colonic polyps    Other male erectile dysfunction    Impaired fasting glucose    Elevated PSA    Lichen sclerosus    Benign prostatic hyperplasia with lower urinary tract symptoms     Advance Care Planning Advance Directive is on file.  ACP discussion was held with the patient during this visit. Patient has an advance directive in EMR which is still valid.             Objective   Vitals:    05/05/25 0859   BP: 126/50   BP Location: Left arm  "  Patient Position: Sitting   Cuff Size: Large Adult   Pulse: 60   Resp: 16   Temp: 98 °F (36.7 °C)   TempSrc: Temporal   SpO2: 96%   Weight: 87.6 kg (193 lb 3.2 oz)   Height: 177.8 cm (70\")   PainSc: 0-No pain       Estimated body mass index is 27.72 kg/m² as calculated from the following:    Height as of this encounter: 177.8 cm (70\").    Weight as of this encounter: 87.6 kg (193 lb 3.2 oz).                Does the patient have evidence of cognitive impairment? No  Lab Results   Component Value Date    TRIG 57 04/15/2025    HDL 50 04/15/2025    LDL 67 04/15/2025    VLDL 12 04/15/2025    HGBA1C 5.40 04/15/2025                                                                                                Health  Risk Assessment    Smoking Status:  Social History     Tobacco Use   Smoking Status Never    Passive exposure: Never   Smokeless Tobacco Never     Alcohol Consumption:  Social History     Substance and Sexual Activity   Alcohol Use Yes    Comment: 2-3 times weekly due to stress.       Fall Risk Screen  [facility], and patient is at LOW risk for falls.Assessment completed on:2025    Depression Screening   Little interest or pleasure in doing things? Not at all   Feeling down, depressed, or hopeless? Several days   PHQ-2 Total Score 1      Health Habits and Functional and Cognitive Screenin/5/2025     9:03 AM   Functional & Cognitive Status   Do you have difficulty preparing food and eating? No   Do you have difficulty bathing yourself, getting dressed or grooming yourself? No   Do you have difficulty using the toilet? No   Do you have difficulty moving around from place to place? No   Do you have trouble with steps or getting out of a bed or a chair? No   Current Diet Well Balanced Diet   Dental Exam Up to date   Eye Exam Not up to date   Exercise (times per week) 3 times per week   Current Exercises Include Dancing;Walking;Yard Work   Do you need help using the " phone?  No   Are you deaf or do you have serious difficulty hearing?  No   Do you need help to go to places out of walking distance? No   Do you need help shopping? No   Do you need help with housework?  No   Do you need help with laundry? No   Do you need help taking your medications? No   Do you need help managing money? No   Do you ever drive or ride in a car without wearing a seat belt? No   Have you felt unusual stress, anger or loneliness in the last month? Yes   Who do you live with? Spouse   If you need help, do you have trouble finding someone available to you? No   Have you been bothered in the last four weeks by sexual problems? No   Do you have difficulty concentrating, remembering or making decisions? No           Age-appropriate Screening Schedule:  Refer to the list below for future screening recommendations based on patient's age, sex and/or medical conditions. Orders for these recommended tests are listed in the plan section. The patient has been provided with a written plan.    Health Maintenance List  Health Maintenance   Topic Date Due    COVID-19 Vaccine (12 - 2024-25 season) 05/06/2025 (Originally 4/19/2025)    TDAP/TD VACCINES (1 - Tdap) 05/06/2025 (Originally 9/26/1964)    ZOSTER VACCINE (1 of 2) 05/06/2025 (Originally 9/26/1996)    RSV Vaccine - Adults (1 - 1-dose 75+ series) 04/22/2026 (Originally 9/26/2021)    INFLUENZA VACCINE  07/01/2025    LIPID PANEL  04/15/2026    ANNUAL WELLNESS VISIT  05/05/2026    COLORECTAL CANCER SCREENING  03/20/2028    HEPATITIS C SCREENING  Completed    Pneumococcal Vaccine 50+  Completed                                                                                                                                                CMS Preventative Services Quick Reference  Risk Factors Identified During Encounter  None Identified    The above risks/problems have been discussed with the patient.  Pertinent information has been shared with the patient in the After  Visit Summary.  An After Visit Summary and PPPS were made available to the patient.    Follow Up:   Next Medicare Wellness visit to be scheduled in 1 year.          Additional E&M Note during same encounter follows:  Patient has multiple medical problems which are significant and separately identifiable that require additional work above and beyond the Medicare Wellness Visit.      Chief Complaint  Medicare Wellness-subsequent (79 year old male here today for his annual medicare wellness )    Reji Mcmillan is a 79 y.o. male who presents to [facility]   History of Present Illness  The patient presents for his Medicare wellness exam and a follow-up visit.    He reports experiencing fatigue and a lack of energy, which he attributes to his age. He maintains an active lifestyle, engaging in physical exercise twice a week for two hours each session. He has been informed of a recurrence of anemia, prompting him to initiate iron supplementation. He reports no gastrointestinal issues or edema. He has a history of blood donation, with the most recent instance occurring in 02/2025. He is currently on iron supplements, which he reports as causing constipation, necessitating the use of two stool softeners. He also reports black stools, which he attributes to the iron supplements as that is when it started.    He has been experiencing some congestion, which he attributes to allergies.    He has been attempting to receive the shingles vaccine at various locations, but it is not covered by his insurance.    He has been prescribed sleep medication, which has resulted in weight gain. He has discontinued this medication and is currently trialing an alternative. He found Ambien to be effective, but it was not covered by his insurance.    He is on amlodipine 5 mg daily and losartan 50 mg daily for hypertension.    He is on atorvastatin 80 mg for cholesterol management.    He is on a thyroid pill  "25 mcg a day.    FAMILY HISTORY  His mother, daughter, and granddaughter are all anemic.  Wife recently passed away.    Objective   Vital Signs:   Vitals:    05/05/25 0859   BP: 126/50   BP Location: Left arm   Patient Position: Sitting   Cuff Size: Large Adult   Pulse: 60   Resp: 16   Temp: 98 °F (36.7 °C)   TempSrc: Temporal   SpO2: 96%   Weight: 87.6 kg (193 lb 3.2 oz)   Height: 177.8 cm (70\")   PainSc: 0-No pain       Wt Readings from Last 3 Encounters:   05/05/25 87.6 kg (193 lb 3.2 oz)   04/22/25 84.8 kg (187 lb)   12/16/24 87.3 kg (192 lb 6.4 oz)     BP Readings from Last 3 Encounters:   05/05/25 126/50   04/22/25 126/58   12/12/24 132/64       Physical Exam  Vitals reviewed.   Constitutional:       General: He is not in acute distress.  Eyes:      Conjunctiva/sclera: Conjunctivae normal.   Cardiovascular:      Rate and Rhythm: Normal rate and regular rhythm.      Heart sounds: Normal heart sounds.   Pulmonary:      Effort: Pulmonary effort is normal.      Breath sounds: Normal breath sounds. No wheezing, rhonchi or rales.   Abdominal:      General: There is no distension.      Palpations: Abdomen is soft. There is no mass.      Tenderness: There is no abdominal tenderness.   Musculoskeletal:      Right lower leg: No edema.      Left lower leg: No edema.   Lymphadenopathy:      Cervical: No cervical adenopathy.   Skin:     General: Skin is warm and dry.   Neurological:      General: No focal deficit present.      Mental Status: He is alert.   Psychiatric:         Mood and Affect: Mood normal.         Thought Content: Thought content normal.       The following data was reviewed by CARSON Gleason on 05/05/2025  Common Labs   Common labs          8/1/2024    17:22 9/26/2024    11:47 4/15/2025    12:33   Common Labs   Glucose  106  91    BUN  17  14    Creatinine 1.60  1.54  1.41    Sodium  140  137    Potassium  4.2  4.4    Chloride  106  101    Calcium  9.1  9.0    Albumin  4.2  3.9    Total Bilirubin  " 0.4  0.3    Alkaline Phosphatase  64  58    AST (SGOT)  20  15    ALT (SGPT)  15  9    WBC  7.02  5.48    Hemoglobin  12.3  10.2    Hematocrit  36.8  32.3    Platelets  288  295    Total Cholesterol  137  129    Triglycerides  88  57    HDL Cholesterol  55  50    LDL Cholesterol   65  67    Hemoglobin A1C  5.50  5.40    PSA  4.760       Results  Labs   - Iron level: 04/15/2025, 24   - Hemoglobin: 04/15/2025, 10.2   - Vitamin D level: 04/15/2025, 24.4   - Thyroid level: 04/15/2025, Normal   - LDL cholesterol: 04/15/2025, 67   - Blood sugar: 04/15/2025, Normal   - A1c: 04/15/2025, Normal      Assessment & Plan   Diagnoses and all orders for this visit:    1. Medicare annual wellness visit, subsequent (Primary)    2. Iron deficiency anemia, unspecified iron deficiency anemia type  -     CBC & Differential; Future  -     Ambulatory Referral to Hematology / Oncology; Future  -     Cancel: Iron Profile; Future  -     Cancel: Ferritin; Future  -     Cancel: Ferritin; Future  -     Cancel: Iron Profile; Future  -     Cancel: CBC w AUTO Differential; Future    3. Vitamin D deficiency  -     Vitamin D,25-Hydroxy; Future    4. Essential hypertension  -     Cancel: Vitamin B12; Future  -     Cancel: Folate; Future  -     Comprehensive Metabolic Panel; Future  -     Lipid Panel; Future  -     Cancel: Vitamin B12; Future  -     Cancel: Folate; Future    5. Mixed hyperlipidemia  -     Comprehensive Metabolic Panel; Future  -     Lipid Panel; Future    6. Hypothyroidism, unspecified type  -     Comprehensive Metabolic Panel; Future  -     Lipid Panel; Future  -     TSH+Free T4; Future    7. Impaired fasting glucose  -     Hemoglobin A1c; Future      Assessment & Plan  1. Iron deficiency anemia.  - Experiencing fatigue and low energy levels, likely due to iron deficiency anemia.  - Hemoglobin was 10.2, and iron level was 24 (normal is ).  - Referral to hematology made for further evaluation and potential iron infusions.  -  Advised to avoid donating blood and continue taking stool softeners to manage constipation caused by iron tablets.    2. Low vitamin D.  - Vitamin D level was 24.4, below the normal range.  - Advised to discuss vitamin D supplementation with Dr. Pedersen, nephrologist, to determine if supplementation is appropriate.    3. Hypertension.  - Hypertension is well controlled with current medication regimen of amlodipine 5 mg daily and losartan 50 mg daily.  - Blood pressure is well controlled today.  - Continue taking medications as prescribed.    4. Hyperlipidemia.  - Cholesterol levels are well controlled with current medication regimen of atorvastatin 80 mg daily.  - LDL is 67, which is satisfactory.  - Continue taking medication as prescribed.    5.  Hypothyroidism.  - Thyroid levels were normal, continue on levothyroxine 25 mcg daily.    6.  Health maintenance.  - Advised to receive shingles vaccine at the local health department, as it is not covered by insurance.  - Vaccine requires two doses; follow up with the second dose as instructed.    Follow-up  - Follow up in 6 months.     FOLLOW UP  Return in about 6 months (around 11/5/2025) for Recheck.  Patient was given instructions and counseling regarding his condition or for health maintenance advice. Please see specific information pulled into the AVS if appropriate.     Patient or patient representative verbalized consent for the use of Ambient Listening during the visit with  CARSON Gleason for chart documentation. 5/5/2025  09:18 EDT    CARSON Gleason  05/05/25  13:17 EDT

## 2025-04-30 DIAGNOSIS — I10 ESSENTIAL HYPERTENSION: ICD-10-CM

## 2025-04-30 RX ORDER — AMLODIPINE BESYLATE 5 MG/1
5 TABLET ORAL DAILY
Qty: 90 TABLET | Refills: 3 | Status: SHIPPED | OUTPATIENT
Start: 2025-04-30

## 2025-05-05 ENCOUNTER — OFFICE VISIT (OUTPATIENT)
Dept: INTERNAL MEDICINE | Age: 80
End: 2025-05-05
Payer: MEDICARE

## 2025-05-05 VITALS
WEIGHT: 193.2 LBS | OXYGEN SATURATION: 96 % | HEART RATE: 60 BPM | DIASTOLIC BLOOD PRESSURE: 50 MMHG | RESPIRATION RATE: 16 BRPM | HEIGHT: 70 IN | BODY MASS INDEX: 27.66 KG/M2 | TEMPERATURE: 98 F | SYSTOLIC BLOOD PRESSURE: 126 MMHG

## 2025-05-05 DIAGNOSIS — E78.2 MIXED HYPERLIPIDEMIA: ICD-10-CM

## 2025-05-05 DIAGNOSIS — Z00.00 MEDICARE ANNUAL WELLNESS VISIT, SUBSEQUENT: Primary | ICD-10-CM

## 2025-05-05 DIAGNOSIS — E55.9 VITAMIN D DEFICIENCY: ICD-10-CM

## 2025-05-05 DIAGNOSIS — I10 ESSENTIAL HYPERTENSION: ICD-10-CM

## 2025-05-05 DIAGNOSIS — R73.01 IMPAIRED FASTING GLUCOSE: ICD-10-CM

## 2025-05-05 DIAGNOSIS — D50.9 IRON DEFICIENCY ANEMIA, UNSPECIFIED IRON DEFICIENCY ANEMIA TYPE: ICD-10-CM

## 2025-05-05 DIAGNOSIS — E03.9 HYPOTHYROIDISM, UNSPECIFIED TYPE: ICD-10-CM

## 2025-05-12 NOTE — PROGRESS NOTES
Chief Complaint/Reason for Referral:  Anemia and Establish Care    Kenzie Mccloud APRN Coffman, Sabrina, CARSON    Records Obtained:  Records of the patients history including those obtained from [facility] EMR were reviewed and summarized in detail.    Subjective    History of Present Illness    Reji Mcmillan 79 y.o. presents to [facility] for Microcytic Anemia    History of Present Illness  The patient is a 79-year-old male who presents to the hematology oncology department as a new patient for further evaluation of his iron deficiency anemia.    He has been experiencing intermittent anemia for several years, which he attributes to a familial predisposition. His mother was anemic, and both his daughter and granddaughter have low iron levels. Despite his anemia, he has not required iron infusions or blood transfusions. He has a history of blood donation, with the most recent instance occurring in 02/2025. He has been advised against further blood donation due to his condition.    He reports fatigue, low energy levels, and decreased stamina. He is currently on oral iron supplements, which have resulted in constipation. He has expressed interest in intravenous iron therapy as a potential treatment option.    He is scheduled for an MRI of his prostate with Dr. Dawson, who is monitoring him for prostatitis. He recently consulted with his nephrologist, who reported an improvement in his condition.    FAMILY HISTORY  His mother was anemic. His daughter and granddaughter have low iron levels.     Cancer-related family history is not on file.     Oncology/Hematology History    No history exists.     Review of Systems    Current Outpatient Medications on File Prior to Visit   Medication Sig Dispense Refill    amLODIPine (NORVASC) 5 MG tablet TAKE 1 TABLET DAILY 90 tablet 3    aspirin 81 MG EC tablet Take 1 tablet by mouth Daily.      atorvastatin (LIPITOR) 80 MG tablet TAKE 1 TABLET  DAILY 90 tablet 3    cetirizine (zyrTEC) 10 MG tablet Take 1 tablet by mouth Daily.      Dupilumab (Dupixent) 300 MG/2ML solution pen-injector Inject 1 mL under the skin into the appropriate area as directed Every 14 (Fourteen) Days.      FLUoxetine (PROzac) 20 MG capsule Take 1 capsule by mouth Daily. 90 capsule 1    ketoconazole (NIZORAL) 2 % cream Apply 1 application  topically to the appropriate area as directed Daily. 30 g 1    lansoprazole (PREVACID) 30 MG capsule TAKE 1 CAPSULE DAILY 90 capsule 3    levothyroxine (SYNTHROID, LEVOTHROID) 25 MCG tablet TAKE 1 TABLET DAILY 90 tablet 3    losartan (COZAAR) 50 MG tablet Take 1 tablet by mouth Daily.      multivitamin with minerals tablet tablet Take 1 tablet by mouth Daily.      ranolazine (RANEXA) 500 MG 12 hr tablet Take 1 tablet by mouth 2 (Two) Times a Day. 180 tablet 3    tadalafil (CIALIS) 5 MG tablet Take 1 tablet by mouth Daily As Needed for Erectile Dysfunction for up to 180 days. 90 tablet 1    tamsulosin (FLOMAX) 0.4 MG capsule 24 hr capsule Take 1 capsule by mouth Daily for 180 days. 90 capsule 1    traZODone (DESYREL) 50 MG tablet Take 1 tablet by mouth Every Night. 30 tablet 2     No current facility-administered medications on file prior to visit.     Allergies   Allergen Reactions    Sulfa Antibiotics Unknown - Low Severity     Flu like symptoms caused from taking medication     Past Medical History:   Diagnosis Date    Allergic rhinitis due to allergen     Benign prostatic hyperplasia     CAD s/p CABG 03/30/2022    Chest pain     Chronic allergic rhinitis     CKD (chronic kidney disease) stage 3, GFR 30-59 ml/min     Eczema     Erectile dysfunction 10/19/2020    GERD (gastroesophageal reflux disease)     Heart disease     High blood pressure     High cholesterol     Hypercholesteremia     Hypertension     Controlled    Hypothyroidism     Orthostatic hypotension 3/17/2022    Polio     Prostate disorder      Past Surgical History:   Procedure  Laterality Date    CARDIAC SURGERY      CHOLECYSTECTOMY      COLONOSCOPY      COLONOSCOPY N/A 3/20/2023    Procedure: COLONOSCOPY;  Surgeon: Jacques Dc MD;  Location: [facility] ENDOSCOPY;  Service: General;  Laterality: N/A;  diverticulosis, hemorrhoids    CORONARY ARTERY BYPASS GRAFT      cabbage x2    DENTAL PROCEDURE      HERNIA REPAIR      TOE SURGERY Left      Social History     Socioeconomic History    Marital status:    Tobacco Use    Smoking status: Never     Passive exposure: Never    Smokeless tobacco: Never   Vaping Use    Vaping status: Never Used   Substance and Sexual Activity    Alcohol use: Yes     Comment: 2-3 times weekly due to stress.    Drug use: Never    Sexual activity: Defer     Family History   Problem Relation Age of Onset    Dementia Mother     Anxiety disorder Mother     Hyperlipidemia Mother     Heart disease Mother     Hypertension Mother     Hyperlipidemia Father     Hypertension Father     Heart attack Father     Depression Sister     Anxiety disorder Sister     No Known Problems Brother     No Known Problems Maternal Aunt     No Known Problems Paternal Aunt     No Known Problems Maternal Uncle     No Known Problems Paternal Uncle     No Known Problems Maternal Grandfather     No Known Problems Maternal Grandmother     No Known Problems Paternal Grandfather     No Known Problems Paternal Grandmother     No Known Problems Cousin     No Known Problems Other     Malig Hyperthermia Neg Hx     ADD / ADHD Neg Hx     Alcohol abuse Neg Hx     Bipolar disorder Neg Hx     Drug abuse Neg Hx     OCD Neg Hx     Paranoid behavior Neg Hx     Schizophrenia Neg Hx     Seizures Neg Hx     Self-Injurious Behavior  Neg Hx     Suicide Attempts Neg Hx      Immunization History   Administered Date(s) Administered    COVID-19 (MODERNA) 12YRS+ (SPIKEVAX) 10/19/2024    COVID-19 (MODERNA) 1st,2nd,3rd Dose Monovalent 01/12/2021, 01/12/2021, 02/09/2021, 02/09/2021, 10/26/2021, 11/05/2021    COVID-19  "(MODERNA) BIVALENT 12+YRS 12/06/2022    COVID-19 (MODERNA) Monovalent Original Booster 01/12/2021, 02/09/2021, 11/05/2021    Fluzone High-Dose 65+YRS 10/12/2021, 10/12/2021, 10/19/2024    Fluzone High-Dose 65+yrs 10/10/2022, 10/23/2023    Fluzone Quad >6mos (Multi-dose) 09/08/2020    Influenza, Unspecified 10/06/2021    Pneumococcal Conjugate 20-Valent (PCV20) 10/10/2022     Tobacco Use: Low Risk  (5/13/2025)    Patient History     Smoking Tobacco Use: Never     Smokeless Tobacco Use: Never     Passive Exposure: Never     Objective   Vitals:    05/13/25 0854   BP: 133/60   Pulse: 58   Resp: 18   Temp: 97.8 °F (36.6 °C)   TempSrc: Temporal   SpO2: 98%   Weight: 88.2 kg (194 lb 6.4 oz)   Height: 177.8 cm (70\")   PainSc: 0-No pain      Physical Exam  Vitals and nursing note reviewed.   Constitutional:       General: He is not in acute distress.     Appearance: Normal appearance. He is not ill-appearing.   HENT:      Head: Normocephalic.   Eyes:      Conjunctiva/sclera: Conjunctivae normal.   Cardiovascular:      Rate and Rhythm: Normal rate and regular rhythm.      Heart sounds: Normal heart sounds.   Pulmonary:      Effort: Pulmonary effort is normal.      Breath sounds: Normal breath sounds.   Skin:     Coloration: Skin is not jaundiced.   Neurological:      Mental Status: He is alert.   Psychiatric:         Mood and Affect: Mood normal.         Behavior: Behavior normal. Behavior is cooperative.         Thought Content: Thought content normal.         Judgment: Judgment normal.       Wt Readings from Last 3 Encounters:   05/13/25 88.2 kg (194 lb 6.4 oz)   05/05/25 87.6 kg (193 lb 3.2 oz)   04/22/25 84.8 kg (187 lb)      Body mass index is 27.89 kg/m².    ECOG score: 0         ECOG: (0) Fully Active - Able to Carry On All Pre-disease Performance Without Restriction  Fall Risk Assessment was completed, and patient is at low risk for falls.  PHQ-9 Total Score:         The patient is  experiencing fatigue. Fatigue " score: 8    PT/OT Functional Screening:   Speech Functional Screening:   Rehab to be ordered:     Vitals:    05/13/25 0854   PainSc: 0-No pain           Reji Mcmillan reports a pain score of 0.      PHQ-2 Depression Screening  Little interest or pleasure in doing things? Not at all   Feeling down, depressed, or hopeless? Several days   PHQ-2 Total Score 1     Result Review :  The following data was reviewed by: Jr Liriano PA-C on 05/13/2025:  RED BLOOD CELLs:  Lab Results   Component Value Date    RBC 4.27 04/15/2025    HGB 10.2 (L) 04/15/2025    HCT 32.3 (L) 04/15/2025    MCV 75.6 (L) 04/15/2025     04/15/2025      WHITE BLOOD CELLs:  Lab Results   Component Value Date    WBC 5.48 04/15/2025    NEUTROABS 3.86 04/15/2025    LYMPHSABS 1.01 04/15/2025    EOSABS 0.08 04/15/2025    BASOSABS 0.01 04/15/2025     RBC EVALUATION (MICROCYTOSIS/NORMOCYTOSIS):  Lab Results   Component Value Date    MCV 75.6 (L) 04/15/2025    IRON 24 (L) 04/15/2025    FERRITIN 14.20 (L) 04/15/2025    LABIRON 5 (L) 04/15/2025    TIBC 444 04/15/2025    TRANSFERRIN 298 04/15/2025     HEMOLYSIS EVALUATION:  Lab Results   Component Value Date    MCV 75.6 (L) 04/15/2025     MACROCYTOSIS EVALUATION:  Lab Results   Component Value Date    MCV 75.6 (L) 04/15/2025    [id number] 461 09/26/2024    FOLATE >20.00 09/26/2024     RENAL FUNCTION TESTs:  Lab Results   Component Value Date    EGFR 51.0 (L) 04/15/2025    BUN 14 04/15/2025     LIVER FUNCTION TESTs:  Lab Results   Component Value Date    ALT 9 04/15/2025    AST 15 04/15/2025    BILITOT 0.3 04/15/2025    BILIDIR <0.2 11/02/2023    ALKPHOS 58 04/15/2025    PROTEINTOT 7.2 04/15/2025    ALBUMIN 3.9 04/15/2025     GLUCOSE EVALUATION:  Lab Results   Component Value Date    GLUCOSE 91 04/15/2025    HGBA1C 5.40 04/15/2025     SERUM CHEMISTRIES:  Lab Results   Component Value Date     04/15/2025    K 4.4 04/15/2025     04/15/2025    CO2 22.4 04/15/2025    CALCIUM 9.0 04/15/2025      URINALYSIS:  Lab Results   Component Value Date    PROTEINUR 16.0 04/15/2025    RBCUR Negative 11/05/2024    LEUKOCYTESUR Trace (A) 04/15/2025    BILIRUBINUR Negative 04/15/2025    PHUR 6.0 04/15/2025    SPECGRAVUR 1.019 04/15/2025     THYROID FUNCTION TESTs:  TSH   Date Value Ref Range Status   04/15/2025 3.050 0.270 - 4.200 uIU/mL Final     Free T4   Date Value Ref Range Status   04/15/2025 1.23 0.92 - 1.68 ng/dL Final     TUMOR MARKERS:  Lab Results   Component Value Date    PSA 4.760 (H) 09/26/2024     No Images in the past 120 days found..    Results  Labs   - White blood cell count: 04/15/2025, 5.4   - White blood cell count: 6.1   - Platelet count: 04/15/2025, 295   - Platelet count: 252   - Hemoglobin: 03/29/2024, 13.2   - Hemoglobin: 12.3   - Hemoglobin: 04/15/2025, 10.2   - Ferritin: 31   - Blood sugar: 91   - Creatinine: 1.4       Assessment and Plan:  Diagnoses and all orders for this visit:    1. Microcytic anemia (Primary)  -     H. Pylori Antigen, Stool - Stool, Per Rectum  -     Vitamin B12 & Folate  -     Comprehensive Metabolic Panel  -     Urinalysis With Microscopic - Urine, Clean Catch  -     Occult Blood, Fecal By Immunoassay - Stool, Per Rectum  -     Reticulocytes  -     Lactate Dehydrogenase  -     Haptoglobin  -     Iron Profile  -     Ferritin  -     CBC & Differential  -     Peripheral Blood Smear  -     Sedimentation Rate  -     C-reactive Protein  -     Free K+L Left Chains,Qn,Ur - Urine, Clean Catch  -     Immunofixation (MADHU), Protein Electrophoresis (PE), and Quantitative Free Kappa and Lambda Light Chains (FLC), Serum  -     MADHU & PE, Random Urine - Urine, Clean Catch    2. Iron deficiency anemia, unspecified iron deficiency anemia type  -     Ambulatory Referral to Hematology / Oncology    3. Adverse effect of iron, sequela  Comments:  GI upset, constipation    4. Stage 3a chronic kidney disease  Comments:  Nephrology - Marla  Orders:  -     Free K+L Left Chains,Qn,Ur - Urine,  Clean Catch  -     Immunofixation (MADHU), Protein Electrophoresis (PE), and Quantitative Free Kappa and Lambda Light Chains (FLC), Serum  -     MADHU & PE, Random Urine - Urine, Clean Catch      Assessment & Plan  Iron deficiency anemia  - Hemoglobin levels decreased from 13.2 on 03/29/2024 to 10.2 on 04/15/2025 (loss of ~2 units of blood)  - Possible relation to blood donation in February 2025  - Ferritin level last recorded as low at 31  - Intravenous iron infusions to be ordered, pending insurance approval (expected to take 2400 to 4800 hours)  - Patient will be contacted to schedule infusion once approval is received  - Continue oral iron supplements until infusions begin  - Intravenous iron bypasses gastrointestinal tract, avoiding side effects like constipation and nausea  - Goal: replenish iron stores, improve hemoglobin levels, alleviate symptoms of fatigue and low energy  - Advise patient to avoid further blood donations to prevent exacerbation of anemia    -Encouraged patient to remain up to date on all recommended preventative medicine services specific for their sex and age.  Some examples include:  Diabetes screening, Hypertensive screening, Immunizations, Lipid screenings (cholesterol), Depression screenings, Colorectal cancer screening, HIV screening, Cervical cancer screening, Breast cancer screening, Osteoporosis screening, Alcohol screening, Dental screening, Tobacco Use screening and Dietary screening    Patient Follow Up: 6-8 weeks with PA/APRN    I spent 45 minutes caring for Reji on this date of service. This time includes time spent by me in the following activities:preparing for the visit, reviewing tests, obtaining and/or reviewing a separately obtained history, performing a medically appropriate examination and/or evaluation , counseling and educating the patient/family/caregiver, ordering medications, tests, or procedures, documenting information in the medical record, independently  interpreting results and communicating that information with the patient/family/caregiver, and care coordination    Patient was given instructions and counseling regarding his condition or for health maintenance advice. Please see specific information pulled into the AVS if appropriate.     Patient or patient representative verbalized consent for the use of Ambient Listening during the visit with  Jr Liriano PA-C for chart documentation. 5/13/2025  09:04 EDT

## 2025-05-13 ENCOUNTER — LAB (OUTPATIENT)
Dept: ONCOLOGY | Facility: HOSPITAL | Age: 80
End: 2025-05-13
Payer: MEDICARE

## 2025-05-13 ENCOUNTER — CONSULT (OUTPATIENT)
Dept: ONCOLOGY | Facility: HOSPITAL | Age: 80
End: 2025-05-13
Payer: MEDICARE

## 2025-05-13 VITALS
SYSTOLIC BLOOD PRESSURE: 133 MMHG | BODY MASS INDEX: 27.83 KG/M2 | OXYGEN SATURATION: 98 % | DIASTOLIC BLOOD PRESSURE: 60 MMHG | HEART RATE: 58 BPM | WEIGHT: 194.4 LBS | HEIGHT: 70 IN | TEMPERATURE: 97.8 F | RESPIRATION RATE: 18 BRPM

## 2025-05-13 DIAGNOSIS — D50.9 IRON DEFICIENCY ANEMIA, UNSPECIFIED IRON DEFICIENCY ANEMIA TYPE: ICD-10-CM

## 2025-05-13 DIAGNOSIS — D64.9 ANEMIA, UNSPECIFIED TYPE: Primary | ICD-10-CM

## 2025-05-13 DIAGNOSIS — D50.9 MICROCYTIC ANEMIA: Primary | ICD-10-CM

## 2025-05-13 DIAGNOSIS — T45.4X5S ADVERSE EFFECT OF IRON, SEQUELA: ICD-10-CM

## 2025-05-13 DIAGNOSIS — D50.9 IRON DEFICIENCY ANEMIA, UNSPECIFIED IRON DEFICIENCY ANEMIA TYPE: Primary | ICD-10-CM

## 2025-05-13 DIAGNOSIS — N18.31 STAGE 3A CHRONIC KIDNEY DISEASE: ICD-10-CM

## 2025-05-13 PROBLEM — T45.4X5A IRON ADVERSE REACTION: Status: ACTIVE | Noted: 2025-05-13

## 2025-05-13 PROBLEM — E61.1 IRON DEFICIENCY: Status: ACTIVE | Noted: 2025-05-13

## 2025-05-13 LAB
ALBUMIN SERPL-MCNC: 4.1 G/DL (ref 3.5–5.2)
ALBUMIN/GLOB SERPL: 1.2 G/DL
ALP SERPL-CCNC: 61 U/L (ref 39–117)
ALT SERPL W P-5'-P-CCNC: 12 U/L (ref 1–41)
ANION GAP SERPL CALCULATED.3IONS-SCNC: 9.6 MMOL/L (ref 5–15)
ANISOCYTOSIS BLD QL: ABNORMAL
AST SERPL-CCNC: 15 U/L (ref 1–40)
BASOPHILS # BLD AUTO: 0.03 10*3/MM3 (ref 0–0.2)
BASOPHILS # BLD MANUAL: 0.06 10*3/MM3 (ref 0–0.2)
BASOPHILS NFR BLD AUTO: 0.5 % (ref 0–1.5)
BASOPHILS NFR BLD MANUAL: 1 % (ref 0–1.5)
BILIRUB SERPL-MCNC: 0.4 MG/DL (ref 0–1.2)
BUN SERPL-MCNC: 18 MG/DL (ref 8–23)
BUN/CREAT SERPL: 12.9 (ref 7–25)
CALCIUM SPEC-SCNC: 9.2 MG/DL (ref 8.6–10.5)
CHLORIDE SERPL-SCNC: 102 MMOL/L (ref 98–107)
CO2 SERPL-SCNC: 24.4 MMOL/L (ref 22–29)
CREAT SERPL-MCNC: 1.4 MG/DL (ref 0.76–1.27)
CRP SERPL-MCNC: 0.32 MG/DL (ref 0–0.5)
DEPRECATED RDW RBC AUTO: 52.3 FL (ref 37–54)
EGFRCR SERPLBLD CKD-EPI 2021: 51.4 ML/MIN/1.73
ELLIPTOCYTES BLD QL SMEAR: ABNORMAL
EOSINOPHIL # BLD AUTO: 0.09 10*3/MM3 (ref 0–0.4)
EOSINOPHIL NFR BLD AUTO: 1.6 % (ref 0.3–6.2)
ERYTHROCYTE [DISTWIDTH] IN BLOOD BY AUTOMATED COUNT: 18.5 % (ref 12.3–15.4)
ERYTHROCYTE [SEDIMENTATION RATE] IN BLOOD: 19 MM/HR (ref 0–20)
FERRITIN SERPL-MCNC: 33.07 NG/ML (ref 30–400)
FOLATE SERPL-MCNC: 7.45 NG/ML (ref 4.78–24.2)
GLOBULIN UR ELPH-MCNC: 3.5 GM/DL
GLUCOSE SERPL-MCNC: 99 MG/DL (ref 65–99)
HAPTOGLOB SERPL-MCNC: 206 MG/DL (ref 30–200)
HCT VFR BLD AUTO: 39.9 % (ref 37.5–51)
HGB BLD-MCNC: 12.5 G/DL (ref 13–17.7)
HYPOCHROMIA BLD QL: ABNORMAL
IMM GRANULOCYTES # BLD AUTO: 0.01 10*3/MM3 (ref 0–0.05)
IMM GRANULOCYTES NFR BLD AUTO: 0.2 % (ref 0–0.5)
IRON 24H UR-MRATE: 89 MCG/DL (ref 59–158)
IRON SATN MFR SERPL: 21 % (ref 20–50)
LARGE PLATELETS: ABNORMAL
LDH SERPL-CCNC: 144 U/L (ref 135–225)
LYMPHOCYTES # BLD AUTO: 1 10*3/MM3 (ref 0.7–3.1)
LYMPHOCYTES # BLD MANUAL: 0.84 10*3/MM3 (ref 0.7–3.1)
LYMPHOCYTES NFR BLD AUTO: 17.8 % (ref 19.6–45.3)
LYMPHOCYTES NFR BLD MANUAL: 10 % (ref 5–12)
MCH RBC QN AUTO: 25.1 PG (ref 26.6–33)
MCHC RBC AUTO-ENTMCNC: 31.3 G/DL (ref 31.5–35.7)
MCV RBC AUTO: 80.1 FL (ref 79–97)
MICROCYTES BLD QL: ABNORMAL
MONOCYTES # BLD AUTO: 0.6 10*3/MM3 (ref 0.1–0.9)
MONOCYTES # BLD: 0.56 10*3/MM3 (ref 0.1–0.9)
MONOCYTES NFR BLD AUTO: 10.7 % (ref 5–12)
NEUTROPHILS # BLD AUTO: 4.17 10*3/MM3 (ref 1.7–7)
NEUTROPHILS NFR BLD AUTO: 3.9 10*3/MM3 (ref 1.7–7)
NEUTROPHILS NFR BLD AUTO: 69.2 % (ref 42.7–76)
NEUTROPHILS NFR BLD MANUAL: 74 % (ref 42.7–76)
NRBC BLD AUTO-RTO: 0 /100 WBC (ref 0–0.2)
PATHOLOGY REVIEW: YES
PLATELET # BLD AUTO: 243 10*3/MM3 (ref 140–450)
PMV BLD AUTO: 11.1 FL (ref 6–12)
POIKILOCYTOSIS BLD QL SMEAR: ABNORMAL
POTASSIUM SERPL-SCNC: 4.5 MMOL/L (ref 3.5–5.2)
PROT SERPL-MCNC: 7.6 G/DL (ref 6–8.5)
RBC # BLD AUTO: 4.98 10*6/MM3 (ref 4.14–5.8)
RETICS # AUTO: 0.08 10*6/MM3 (ref 0.02–0.13)
RETICS/RBC NFR AUTO: 1.67 % (ref 0.7–1.9)
SMALL PLATELETS BLD QL SMEAR: ADEQUATE
SODIUM SERPL-SCNC: 136 MMOL/L (ref 136–145)
TIBC SERPL-MCNC: 426 MCG/DL (ref 298–536)
TRANSFERRIN SERPL-MCNC: 286 MG/DL (ref 200–360)
VARIANT LYMPHS NFR BLD MANUAL: 15 % (ref 19.6–45.3)
VIT B12 BLD-MCNC: 362 PG/ML (ref 211–946)
WBC MORPH BLD: NORMAL
WBC NRBC COR # BLD AUTO: 5.63 10*3/MM3 (ref 3.4–10.8)

## 2025-05-13 PROCEDURE — 83010 ASSAY OF HAPTOGLOBIN QUANT: CPT | Performed by: PHYSICIAN ASSISTANT

## 2025-05-13 PROCEDURE — 82728 ASSAY OF FERRITIN: CPT | Performed by: PHYSICIAN ASSISTANT

## 2025-05-13 PROCEDURE — 82746 ASSAY OF FOLIC ACID SERUM: CPT | Performed by: PHYSICIAN ASSISTANT

## 2025-05-13 PROCEDURE — 86140 C-REACTIVE PROTEIN: CPT | Performed by: PHYSICIAN ASSISTANT

## 2025-05-13 PROCEDURE — 83521 IG LIGHT CHAINS FREE EACH: CPT | Performed by: PHYSICIAN ASSISTANT

## 2025-05-13 PROCEDURE — 86334 IMMUNOFIX E-PHORESIS SERUM: CPT | Performed by: PHYSICIAN ASSISTANT

## 2025-05-13 PROCEDURE — 83540 ASSAY OF IRON: CPT | Performed by: PHYSICIAN ASSISTANT

## 2025-05-13 PROCEDURE — 82607 VITAMIN B-12: CPT | Performed by: PHYSICIAN ASSISTANT

## 2025-05-13 PROCEDURE — 83615 LACTATE (LD) (LDH) ENZYME: CPT | Performed by: PHYSICIAN ASSISTANT

## 2025-05-13 PROCEDURE — 36415 COLL VENOUS BLD VENIPUNCTURE: CPT | Performed by: PHYSICIAN ASSISTANT

## 2025-05-13 PROCEDURE — 82784 ASSAY IGA/IGD/IGG/IGM EACH: CPT | Performed by: PHYSICIAN ASSISTANT

## 2025-05-13 PROCEDURE — 85045 AUTOMATED RETICULOCYTE COUNT: CPT | Performed by: PHYSICIAN ASSISTANT

## 2025-05-13 PROCEDURE — G0463 HOSPITAL OUTPT CLINIC VISIT: HCPCS | Performed by: PHYSICIAN ASSISTANT

## 2025-05-13 PROCEDURE — 80053 COMPREHEN METABOLIC PANEL: CPT | Performed by: PHYSICIAN ASSISTANT

## 2025-05-13 PROCEDURE — 84165 PROTEIN E-PHORESIS SERUM: CPT | Performed by: PHYSICIAN ASSISTANT

## 2025-05-13 PROCEDURE — 85025 COMPLETE CBC W/AUTO DIFF WBC: CPT | Performed by: PHYSICIAN ASSISTANT

## 2025-05-13 PROCEDURE — 84466 ASSAY OF TRANSFERRIN: CPT | Performed by: PHYSICIAN ASSISTANT

## 2025-05-13 PROCEDURE — 85652 RBC SED RATE AUTOMATED: CPT | Performed by: PHYSICIAN ASSISTANT

## 2025-05-14 LAB
CYTO UR: NORMAL
LAB AP CASE REPORT: NORMAL
LAB AP CLINICAL INFORMATION: NORMAL
PATH REPORT.FINAL DX SPEC: NORMAL
PATH REPORT.GROSS SPEC: NORMAL

## 2025-05-15 ENCOUNTER — LAB (OUTPATIENT)
Facility: HOSPITAL | Age: 80
End: 2025-05-15
Payer: MEDICARE

## 2025-05-15 LAB
ALBUMIN SERPL ELPH-MCNC: 3.5 G/DL (ref 2.9–4.4)
ALBUMIN/GLOB SERPL: 1 {RATIO} (ref 0.7–1.7)
ALPHA1 GLOB SERPL ELPH-MCNC: 0.3 G/DL (ref 0–0.4)
ALPHA2 GLOB SERPL ELPH-MCNC: 0.9 G/DL (ref 0.4–1)
B-GLOBULIN SERPL ELPH-MCNC: 1.1 G/DL (ref 0.7–1.3)
BACTERIA UR QL AUTO: ABNORMAL /HPF
BILIRUB UR QL STRIP: NEGATIVE
CLARITY UR: CLEAR
COLOR UR: YELLOW
GAMMA GLOB SERPL ELPH-MCNC: 1.2 G/DL (ref 0.4–1.8)
GLOBULIN SER-MCNC: 3.6 G/DL (ref 2.2–3.9)
GLUCOSE UR STRIP-MCNC: NEGATIVE MG/DL
H. PYLORI ANTIGEN STOOL: NEGATIVE
HEMOCCULT STL QL IA: NEGATIVE
HGB UR QL STRIP.AUTO: NEGATIVE
HYALINE CASTS UR QL AUTO: ABNORMAL /LPF
IGA SERPL-MCNC: 368 MG/DL (ref 61–437)
IGG SERPL-MCNC: 1291 MG/DL (ref 603–1613)
IGM SERPL-MCNC: 63 MG/DL (ref 15–143)
INTERPRETATION SERPL IEP-IMP: ABNORMAL
KAPPA LC FREE SER-MCNC: 29.9 MG/L (ref 3.3–19.4)
KAPPA LC FREE/LAMBDA FREE SER: 1.57 {RATIO} (ref 0.26–1.65)
KETONES UR QL STRIP: NEGATIVE
LABORATORY COMMENT REPORT: ABNORMAL
LAMBDA LC FREE SERPL-MCNC: 19.1 MG/L (ref 5.7–26.3)
LEUKOCYTE ESTERASE UR QL STRIP.AUTO: NEGATIVE
M PROTEIN SERPL ELPH-MCNC: ABNORMAL G/DL
MUCOUS THREADS URNS QL MICRO: ABNORMAL /HPF
NITRITE UR QL STRIP: NEGATIVE
PH UR STRIP.AUTO: 7 [PH] (ref 5–8)
PROT SERPL-MCNC: 7.1 G/DL (ref 6–8.5)
PROT UR QL STRIP: ABNORMAL
RBC # UR STRIP: ABNORMAL /HPF
REF LAB TEST METHOD: ABNORMAL
SP GR UR STRIP: 1.03 (ref 1–1.03)
SPERM URNS QL MICRO: ABNORMAL /HPF
SQUAMOUS #/AREA URNS HPF: ABNORMAL /HPF
UROBILINOGEN UR QL STRIP: ABNORMAL
WBC # UR STRIP: ABNORMAL /HPF

## 2025-05-15 PROCEDURE — 84156 ASSAY OF PROTEIN URINE: CPT | Performed by: PHYSICIAN ASSISTANT

## 2025-05-15 PROCEDURE — 81001 URINALYSIS AUTO W/SCOPE: CPT | Performed by: PHYSICIAN ASSISTANT

## 2025-05-15 PROCEDURE — 87338 HPYLORI STOOL AG IA: CPT | Performed by: PHYSICIAN ASSISTANT

## 2025-05-15 PROCEDURE — 83521 IG LIGHT CHAINS FREE EACH: CPT | Performed by: PHYSICIAN ASSISTANT

## 2025-05-15 PROCEDURE — 82274 ASSAY TEST FOR BLOOD FECAL: CPT | Performed by: PHYSICIAN ASSISTANT

## 2025-05-15 PROCEDURE — 84166 PROTEIN E-PHORESIS/URINE/CSF: CPT | Performed by: PHYSICIAN ASSISTANT

## 2025-05-15 PROCEDURE — 86335 IMMUNFIX E-PHORSIS/URINE/CSF: CPT | Performed by: PHYSICIAN ASSISTANT

## 2025-05-17 LAB
KAPPA LC FREE UR-MCNC: 123.22 MG/L (ref 1.17–86.46)
KAPPA LC FREE/LAMBDA FREE UR: 10.21 (ref 1.83–14.26)
LAMBDA LC FREE UR-MCNC: 12.07 MG/L (ref 0.27–15.21)

## 2025-05-19 LAB
ALBUMIN 24H MFR UR ELPH: 16.1 %
ALPHA1 GLOB 24H MFR UR ELPH: 3 %
ALPHA2 GLOB 24H MFR UR ELPH: 20.7 %
B-GLOBULIN MFR UR ELPH: 37.4 %
GAMMA GLOB 24H MFR UR ELPH: 22.8 %
INTERPRETATION UR IFE-IMP: NORMAL
Lab: NORMAL
M PROTEIN 24H MFR UR ELPH: NORMAL %
PROT UR-MCNC: 30.9 MG/DL

## 2025-05-20 ENCOUNTER — TELEPHONE (OUTPATIENT)
Dept: ONCOLOGY | Facility: HOSPITAL | Age: 80
End: 2025-05-20
Payer: MEDICARE

## 2025-05-20 DIAGNOSIS — T45.4X5S ADVERSE EFFECT OF IRON, SEQUELA: ICD-10-CM

## 2025-05-20 DIAGNOSIS — E61.1 IRON DEFICIENCY: Primary | ICD-10-CM

## 2025-05-20 NOTE — TELEPHONE ENCOUNTER
Caller: Reji Mcmillan    Relationship: Self    Best call back number: [phone]     What is the best time to reach you: ANYTIME    Who are you requesting to speak with (clinical staff, provider,  specific staff member): NON-CLINICAL     What was the call regarding: CALLING TO FOLLOW UP ON WHEN HE WILL BE GETTING HIS IRON INFUSIONS SCHEDULED

## 2025-05-21 RX ORDER — FAMOTIDINE 10 MG/ML
20 INJECTION, SOLUTION INTRAVENOUS AS NEEDED
OUTPATIENT
Start: 2025-05-21

## 2025-05-21 RX ORDER — HYDROCORTISONE SODIUM SUCCINATE 100 MG/2ML
100 INJECTION INTRAMUSCULAR; INTRAVENOUS AS NEEDED
OUTPATIENT
Start: 2025-05-21

## 2025-05-21 RX ORDER — DIPHENHYDRAMINE HYDROCHLORIDE 50 MG/ML
50 INJECTION, SOLUTION INTRAMUSCULAR; INTRAVENOUS AS NEEDED
OUTPATIENT
Start: 2025-05-21

## 2025-05-21 RX ORDER — SODIUM CHLORIDE 9 MG/ML
20 INJECTION, SOLUTION INTRAVENOUS ONCE
OUTPATIENT
Start: 2025-05-21

## 2025-05-23 NOTE — TELEPHONE ENCOUNTER
LEFT MESSAGE FOR PATIENT IN REGARDS TO IRON INFUSION SCHEDULING, I ASKED FOR PATIENT TO CALL OFFICE BACK.

## 2025-05-26 DIAGNOSIS — F51.05 INSOMNIA DUE TO MENTAL CONDITION: ICD-10-CM

## 2025-05-26 DIAGNOSIS — F33.1 MAJOR DEPRESSIVE DISORDER, RECURRENT EPISODE, MODERATE: ICD-10-CM

## 2025-05-26 DIAGNOSIS — F41.1 GENERALIZED ANXIETY DISORDER: ICD-10-CM

## 2025-05-27 ENCOUNTER — HOSPITAL ENCOUNTER (OUTPATIENT)
Dept: INFUSION THERAPY | Facility: HOSPITAL | Age: 80
Discharge: HOME | End: 2025-05-27
Admitting: PHYSICIAN ASSISTANT
Payer: MEDICARE

## 2025-05-27 VITALS
DIASTOLIC BLOOD PRESSURE: 59 MMHG | RESPIRATION RATE: 20 BRPM | OXYGEN SATURATION: 100 % | HEIGHT: 70 IN | WEIGHT: 190.26 LBS | TEMPERATURE: 97.7 F | HEART RATE: 50 BPM | BODY MASS INDEX: 27.24 KG/M2 | SYSTOLIC BLOOD PRESSURE: 140 MMHG

## 2025-05-27 DIAGNOSIS — T45.4X5S ADVERSE EFFECT OF IRON, SEQUELA: ICD-10-CM

## 2025-05-27 DIAGNOSIS — E61.1 IRON DEFICIENCY: Primary | ICD-10-CM

## 2025-05-27 PROCEDURE — 96365 THER/PROPH/DIAG IV INF INIT: CPT

## 2025-05-27 PROCEDURE — 25010000002 IRON SUCROSE PER 1 MG: Performed by: PHYSICIAN ASSISTANT

## 2025-05-27 PROCEDURE — 96366 THER/PROPH/DIAG IV INF ADDON: CPT

## 2025-05-27 PROCEDURE — 25810000003 SODIUM CHLORIDE 0.9 % SOLUTION: Performed by: PHYSICIAN ASSISTANT

## 2025-05-27 RX ORDER — MIRTAZAPINE 30 MG/1
TABLET, FILM COATED ORAL
Qty: 90 TABLET | Refills: 3 | Status: SHIPPED | OUTPATIENT
Start: 2025-05-27

## 2025-05-27 RX ADMIN — IRON SUCROSE 300 MG: 20 INJECTION, SOLUTION INTRAVENOUS at 14:13

## 2025-05-28 ENCOUNTER — HOSPITAL ENCOUNTER (OUTPATIENT)
Dept: INFUSION THERAPY | Facility: HOSPITAL | Age: 80
Discharge: HOME | End: 2025-05-28
Admitting: PHYSICIAN ASSISTANT
Payer: MEDICARE

## 2025-05-28 VITALS
SYSTOLIC BLOOD PRESSURE: 146 MMHG | RESPIRATION RATE: 20 BRPM | HEART RATE: 60 BPM | OXYGEN SATURATION: 98 % | DIASTOLIC BLOOD PRESSURE: 60 MMHG | TEMPERATURE: 98.1 F

## 2025-05-28 DIAGNOSIS — E61.1 IRON DEFICIENCY: Primary | ICD-10-CM

## 2025-05-28 DIAGNOSIS — T45.4X5S ADVERSE EFFECT OF IRON, SEQUELA: ICD-10-CM

## 2025-05-28 PROCEDURE — 96366 THER/PROPH/DIAG IV INF ADDON: CPT

## 2025-05-28 PROCEDURE — 25010000002 IRON SUCROSE PER 1 MG: Performed by: PHYSICIAN ASSISTANT

## 2025-05-28 PROCEDURE — 96365 THER/PROPH/DIAG IV INF INIT: CPT

## 2025-05-28 PROCEDURE — 25810000003 SODIUM CHLORIDE 0.9 % SOLUTION: Performed by: PHYSICIAN ASSISTANT

## 2025-05-28 RX ADMIN — IRON SUCROSE 300 MG: 20 INJECTION, SOLUTION INTRAVENOUS at 15:41

## 2025-05-29 ENCOUNTER — HOSPITAL ENCOUNTER (OUTPATIENT)
Dept: INFUSION THERAPY | Facility: HOSPITAL | Age: 80
Discharge: HOME | End: 2025-05-29
Admitting: PHYSICIAN ASSISTANT
Payer: MEDICARE

## 2025-05-29 VITALS
OXYGEN SATURATION: 99 % | HEART RATE: 51 BPM | TEMPERATURE: 97.9 F | RESPIRATION RATE: 18 BRPM | WEIGHT: 192.46 LBS | SYSTOLIC BLOOD PRESSURE: 129 MMHG | BODY MASS INDEX: 27.62 KG/M2 | DIASTOLIC BLOOD PRESSURE: 55 MMHG

## 2025-05-29 DIAGNOSIS — T45.4X5S ADVERSE EFFECT OF IRON, SEQUELA: ICD-10-CM

## 2025-05-29 DIAGNOSIS — E61.1 IRON DEFICIENCY: Primary | ICD-10-CM

## 2025-05-29 PROCEDURE — 25010000002 IRON SUCROSE PER 1 MG: Performed by: PHYSICIAN ASSISTANT

## 2025-05-29 PROCEDURE — 96366 THER/PROPH/DIAG IV INF ADDON: CPT

## 2025-05-29 PROCEDURE — 96365 THER/PROPH/DIAG IV INF INIT: CPT

## 2025-05-29 PROCEDURE — 25810000003 SODIUM CHLORIDE 0.9 % SOLUTION: Performed by: PHYSICIAN ASSISTANT

## 2025-05-29 RX ORDER — SODIUM CHLORIDE 9 MG/ML
20 INJECTION, SOLUTION INTRAVENOUS ONCE
Status: DISCONTINUED | OUTPATIENT
Start: 2025-05-29 | End: 2025-05-31 | Stop reason: HOSPADM

## 2025-05-29 RX ADMIN — IRON SUCROSE 300 MG: 20 INJECTION, SOLUTION INTRAVENOUS at 15:40

## 2025-06-09 DIAGNOSIS — F51.05 INSOMNIA DUE TO MENTAL CONDITION: ICD-10-CM

## 2025-06-09 RX ORDER — TRAZODONE HYDROCHLORIDE 50 MG/1
50 TABLET ORAL NIGHTLY
Qty: 30 TABLET | Refills: 2 | OUTPATIENT
Start: 2025-06-09

## 2025-06-09 NOTE — TELEPHONE ENCOUNTER
REFILL REQUEST:    traZODone (DESYREL) 50 MG tablet (04/22/2025)    -PT SHOULD HAVE REFILLS AT THE PHARMACY.    F/UP: 07/24/2025.  LOV: 04/22/2025.

## 2025-06-18 ENCOUNTER — OFFICE VISIT (OUTPATIENT)
Dept: CARDIOLOGY | Facility: CLINIC | Age: 80
End: 2025-06-18
Payer: MEDICARE

## 2025-06-18 VITALS
DIASTOLIC BLOOD PRESSURE: 56 MMHG | SYSTOLIC BLOOD PRESSURE: 135 MMHG | BODY MASS INDEX: 27.17 KG/M2 | HEIGHT: 70 IN | WEIGHT: 189.8 LBS | HEART RATE: 53 BPM

## 2025-06-18 DIAGNOSIS — E78.2 MIXED HYPERLIPIDEMIA: ICD-10-CM

## 2025-06-18 DIAGNOSIS — I25.810 CORONARY ARTERY DISEASE INVOLVING CORONARY BYPASS GRAFT OF NATIVE HEART WITHOUT ANGINA PECTORIS: Primary | ICD-10-CM

## 2025-06-18 DIAGNOSIS — R07.2 CHEST PAIN, PRECORDIAL: ICD-10-CM

## 2025-06-18 DIAGNOSIS — I10 ESSENTIAL HYPERTENSION: ICD-10-CM

## 2025-06-18 NOTE — ASSESSMENT & PLAN NOTE
Blood pressure appears to be at goal range continue with losartan 50 mg once a day and amlodipine 5 once a day,

## 2025-06-18 NOTE — PROGRESS NOTES
Chief Complaint  Follow-up, Hypertension, Hyperlipidemia, and Coronary Artery Disease    Subjective    Patient has had some exertional related discomfort substernally as a pressure for last couple months with doing activities at home or working he reports still being able to go to the gym and do his cardio workouts there with weight gains and weights without any problems.  Was noted to have some anemia low iron counts underwent infusion about a week ago states that his symptoms up discomfort have improved since then  Past Medical History:   Diagnosis Date    Allergic rhinitis due to allergen     Benign prostatic hyperplasia     CAD s/p CABG 03/30/2022    Chest pain     Chronic allergic rhinitis     CKD (chronic kidney disease) stage 3, GFR 30-59 ml/min     Eczema     Erectile dysfunction 10/19/2020    GERD (gastroesophageal reflux disease)     Heart disease     High blood pressure     High cholesterol     Hypercholesteremia     Hypertension     Controlled    Hypothyroidism     Iron (Fe) deficiency anemia     Orthostatic hypotension 03/17/2022    Polio     Prostate disorder          Current Outpatient Medications:     amLODIPine (NORVASC) 5 MG tablet, TAKE 1 TABLET DAILY, Disp: 90 tablet, Rfl: 3    aspirin 81 MG EC tablet, Take 1 tablet by mouth Daily., Disp: , Rfl:     atorvastatin (LIPITOR) 80 MG tablet, TAKE 1 TABLET DAILY, Disp: 90 tablet, Rfl: 3    cetirizine (zyrTEC) 10 MG tablet, Take 1 tablet by mouth Daily., Disp: , Rfl:     Dupilumab (Dupixent) 300 MG/2ML solution pen-injector, Inject 1 mL under the skin into the appropriate area as directed Every 14 (Fourteen) Days., Disp: , Rfl:     FLUoxetine (PROzac) 20 MG capsule, TAKE 1 CAPSULE DAILY (REPLACES 10 MG DOSE), Disp: 90 capsule, Rfl: 3    ketoconazole (NIZORAL) 2 % cream, Apply 1 application  topically to the appropriate area as directed Daily., Disp: 30 g, Rfl: 1    lansoprazole (PREVACID) 30 MG capsule, TAKE 1 CAPSULE DAILY, Disp: 90 capsule, Rfl: 3     levothyroxine (SYNTHROID, LEVOTHROID) 25 MCG tablet, TAKE 1 TABLET DAILY, Disp: 90 tablet, Rfl: 3    losartan (COZAAR) 50 MG tablet, Take 1 tablet by mouth Daily., Disp: , Rfl:     mirtazapine (REMERON) 30 MG tablet, TAKE 1 TABLET EVERY NIGHT (REPLACES 22.5 MG DOSE), Disp: 90 tablet, Rfl: 3    multivitamin with minerals tablet tablet, Take 1 tablet by mouth Daily., Disp: , Rfl:     ranolazine (RANEXA) 500 MG 12 hr tablet, Take 1 tablet by mouth 2 (Two) Times a Day., Disp: 180 tablet, Rfl: 3    traZODone (DESYREL) 50 MG tablet, Take 1 tablet by mouth Every Night., Disp: 30 tablet, Rfl: 2    vitamin B-12 (CYANOCOBALAMIN) 1000 MCG tablet, Take 1 tablet by mouth Daily., Disp: 90 tablet, Rfl: 1    tadalafil (CIALIS) 5 MG tablet, Take 1 tablet by mouth Daily As Needed for Erectile Dysfunction for up to 180 days., Disp: 90 tablet, Rfl: 1    There are no discontinued medications.  Allergies   Allergen Reactions    Sulfa Antibiotics Unknown - Low Severity     Flu like symptoms caused from taking medication        Social History     Tobacco Use    Smoking status: Never     Passive exposure: Never    Smokeless tobacco: Never   Vaping Use    Vaping status: Never Used   Substance Use Topics    Alcohol use: Yes     Comment: 2-3 times weekly due to stress.    Drug use: Never       Family History   Problem Relation Age of Onset    Dementia Mother     Anxiety disorder Mother     Hyperlipidemia Mother     Heart disease Mother     Hypertension Mother     Hyperlipidemia Father     Hypertension Father     Heart attack Father     Depression Sister     Anxiety disorder Sister     No Known Problems Brother     No Known Problems Maternal Aunt     No Known Problems Paternal Aunt     No Known Problems Maternal Uncle     No Known Problems Paternal Uncle     No Known Problems Maternal Grandfather     No Known Problems Maternal Grandmother     No Known Problems Paternal Grandfather     No Known Problems Paternal Grandmother     No Known Problems  "Cousin     No Known Problems Other     Malig Hyperthermia Neg Hx     ADD / ADHD Neg Hx     Alcohol abuse Neg Hx     Bipolar disorder Neg Hx     Drug abuse Neg Hx     OCD Neg Hx     Paranoid behavior Neg Hx     Schizophrenia Neg Hx     Seizures Neg Hx     Self-Injurious Behavior  Neg Hx     Suicide Attempts Neg Hx         Objective     /56   Pulse 53   Ht 177.8 cm (70\")   Wt 86.1 kg (189 lb 12.8 oz)   BMI 27.23 kg/m²       Physical Exam    General Appearance:   no acute distress  Alert and oriented x3  HENT:   lips not cyanotic  Atraumatic  Neck:  No jvd   supple  Respiratory:  no respiratory distress  normal breath sounds  no rales  Cardiovascular:  Regular rate and rhythm  no S3, no S4   no murmur  no rub  Extremities  No cyanosis  lower extremity edema: none    Skin:   warm, dry  No rashes      Result Review :     No results found for: \"PROBNP\"  CMP          9/26/2024    11:47 4/15/2025    12:33 5/13/2025    09:34   CMP   Glucose 106  91  99    BUN 17  14  18    Creatinine 1.54  1.41  1.40    EGFR 45.9  51.0  51.4    Sodium 140  137  136    Potassium 4.2  4.4  4.5    Chloride 106  101  102    Calcium 9.1  9.0  9.2    Total Protein 7.2  7.2  7.6     7.1    Albumin 4.2  3.9  4.1     3.5    Globulin 3.0  3.3  3.5     3.6    Total Bilirubin 0.4  0.3  0.4    Alkaline Phosphatase 64  58  61    AST (SGOT) 20  15  15    ALT (SGPT) 15  9  12    Albumin/Globulin Ratio 1.4  1.2  1.2     1.0    BUN/Creatinine Ratio 11.0  9.9  12.9    Anion Gap 10.9  13.6  9.6      CBC w/diff          9/26/2024    11:47 4/15/2025    12:33 5/13/2025    09:34   CBC w/Diff   WBC 7.02  5.48  5.63    RBC 4.29  4.27  4.98    Hemoglobin 12.3  10.2  12.5    Hematocrit 36.8  32.3  39.9    MCV 85.8  75.6  80.1    MCH 28.7  23.9  25.1    MCHC 33.4  31.6  31.3    RDW 13.6  14.3  18.5    Platelets 288  295  243    Neutrophil Rel % 75.9  70.4  69.2    Immature Granulocyte Rel % 0.7  0.4  0.2    Lymphocyte Rel % 13.1  18.4  17.8    Monocyte Rel % " "9.1  9.1  10.7    Eosinophil Rel % 1.1  1.5  1.6    Basophil Rel % 0.1  0.2  0.5       Lab Results   Component Value Date    TSH 3.050 04/15/2025      Lab Results   Component Value Date    FREET4 1.23 04/15/2025      No results found for: \"DDIMERQUANT\"  Magnesium   Date Value Ref Range Status   09/26/2024 1.9 1.6 - 2.4 mg/dL Final      No results found for: \"DIGOXIN\"   No results found for: \"TROPONINT\"        Lipid Panel          9/26/2024    11:47 4/15/2025    12:33   Lipid Panel   Total Cholesterol 137  129    Triglycerides 88  57    HDL Cholesterol 55  50    VLDL Cholesterol 17  12    LDL Cholesterol  65  67    LDL/HDL Ratio 1.17  1.35      No results found for: \"POCTROP\"         ECG 12 Lead    Date/Time: 6/18/2025 10:23 AM  Performed by: Jon Mata MD    Authorized by: Jon Mata MD  Comparison: compared with previous ECG   Similar to previous ECG  Rhythm: sinus rhythm  Comments: Possible old inferior MI                 Diagnoses and all orders for this visit:    1. Coronary artery disease involving coronary bypass graft of native heart without angina pectoris (Primary)  Assessment & Plan:  Patient with recent symptomatic problems with exertional chest pain sign consistent with angina now improved since his iron infusion his EKG today shows no acute changes we will continue with his amlodipine 5, and Ranexa 500 twice daily dosing.  He reports improvement since his iron infusion we will go ahead and get noninvasive stress test to see how he is doing if any perfusion defects would likely proceed with heart catheterization if not we will see him back in a month if symptoms do not improve after his infusion we will go ahead and pursue heart catheterization well.  Continue with his chronic Aspir 81 milligrams once     Orders:  -     Stress Test With Myocardial Perfusion One Day; Future    2. Chest pain, precordial  -     Stress Test With Myocardial Perfusion One Day; Future    3. Essential " hypertension  Assessment & Plan:  Blood pressure appears to be at goal range continue with losartan 50 mg once a day and amlodipine 5 once a day,       4. Mixed hyperlipidemia  Assessment & Plan:  Lipitor nightly LDL goal       Other orders  -     ECG 12 Lead            Follow Up     Return in about 4 weeks (around 7/16/2025) for Follow with Varsha Jang.          Patient was given instructions and counseling regarding his condition or for health maintenance advice. Please see specific information pulled into the AVS if appropriate.

## 2025-06-18 NOTE — ASSESSMENT & PLAN NOTE
Patient with recent symptomatic problems with exertional chest pain sign consistent with angina now improved since his iron infusion his EKG today shows no acute changes we will continue with his amlodipine 5, and Ranexa 500 twice daily dosing.  He reports improvement since his iron infusion we will go ahead and get noninvasive stress test to see how he is doing if any perfusion defects would likely proceed with heart catheterization if not we will see him back in a month if symptoms do not improve after his infusion we will go ahead and pursue heart catheterization well.  Continue with his chronic Aspir 81 milligrams once

## 2025-06-23 DIAGNOSIS — N13.8 PROSTATIC NODULE WITH OBSTRUCTION: ICD-10-CM

## 2025-06-23 DIAGNOSIS — N40.3 PROSTATIC NODULE WITH OBSTRUCTION: ICD-10-CM

## 2025-06-23 RX ORDER — TAMSULOSIN HYDROCHLORIDE 0.4 MG/1
1 CAPSULE ORAL DAILY
Qty: 90 CAPSULE | Refills: 3 | Status: SHIPPED | OUTPATIENT
Start: 2025-06-23

## 2025-07-07 ENCOUNTER — HOSPITAL ENCOUNTER (OUTPATIENT)
Facility: HOSPITAL | Age: 80
Discharge: HOME | End: 2025-07-07
Payer: MEDICARE

## 2025-07-07 DIAGNOSIS — I25.810 CORONARY ARTERY DISEASE INVOLVING CORONARY BYPASS GRAFT OF NATIVE HEART WITHOUT ANGINA PECTORIS: ICD-10-CM

## 2025-07-07 DIAGNOSIS — R07.2 CHEST PAIN, PRECORDIAL: ICD-10-CM

## 2025-07-07 PROCEDURE — A9502 TC99M TETROFOSMIN: HCPCS | Performed by: INTERNAL MEDICINE

## 2025-07-07 PROCEDURE — 78452 HT MUSCLE IMAGE SPECT MULT: CPT

## 2025-07-07 PROCEDURE — 25010000002 REGADENOSON 0.4 MG/5ML SOLUTION: Performed by: INTERNAL MEDICINE

## 2025-07-07 PROCEDURE — 93017 CV STRESS TEST TRACING ONLY: CPT

## 2025-07-07 PROCEDURE — 34310000005 TECHNETIUM TETROFOSMIN KIT: Performed by: INTERNAL MEDICINE

## 2025-07-07 RX ORDER — REGADENOSON 0.08 MG/ML
0.4 INJECTION, SOLUTION INTRAVENOUS
Status: COMPLETED | OUTPATIENT
Start: 2025-07-07 | End: 2025-07-07

## 2025-07-07 RX ADMIN — TETROFOSMIN 1 DOSE: 1.38 INJECTION, POWDER, LYOPHILIZED, FOR SOLUTION INTRAVENOUS at 08:00

## 2025-07-07 RX ADMIN — TETROFOSMIN 1 DOSE: 1.38 INJECTION, POWDER, LYOPHILIZED, FOR SOLUTION INTRAVENOUS at 07:01

## 2025-07-07 RX ADMIN — REGADENOSON 0.4 MG: 0.08 INJECTION, SOLUTION INTRAVENOUS at 08:00

## 2025-07-11 ENCOUNTER — RESULTS FOLLOW-UP (OUTPATIENT)
Dept: CARDIOLOGY | Facility: CLINIC | Age: 80
End: 2025-07-11
Payer: MEDICARE

## 2025-07-11 LAB
BH CV IMMEDIATE POST RECOVERY TECH DATA SYMPTOMS: NORMAL
BH CV IMMEDIATE POST TECH DATA BLOOD PRESSURE: NORMAL MMHG
BH CV IMMEDIATE POST TECH DATA HEART RATE: 63 BPM
BH CV IMMEDIATE POST TECH DATA OXYGEN SATS: 97 %
BH CV REST NUCLEAR ISOTOPE DOSE: 10.1 MCI
BH CV SIX MINUTE RECOVERY TECH DATA BLOOD PRESSURE: NORMAL
BH CV SIX MINUTE RECOVERY TECH DATA HEART RATE: 60 BPM
BH CV SIX MINUTE RECOVERY TECH DATA OXYGEN SATURATION: 97 %
BH CV SIX MINUTE RECOVERY TECH DATA SYMPTOMS: NORMAL
BH CV STRESS BP STAGE 1: NORMAL
BH CV STRESS COMMENTS STAGE 1: NORMAL
BH CV STRESS DOSE REGADENOSON STAGE 1: 0.4
BH CV STRESS DURATION MIN STAGE 1: 0
BH CV STRESS DURATION SEC STAGE 1: 10
BH CV STRESS HR STAGE 1: 61
BH CV STRESS NUCLEAR ISOTOPE DOSE: 36.1 MCI
BH CV STRESS O2 STAGE 1: 93
BH CV STRESS PROTOCOL 1: NORMAL
BH CV STRESS RECOVERY BP: NORMAL MMHG
BH CV STRESS RECOVERY HR: 60 BPM
BH CV STRESS RECOVERY O2: 97 %
BH CV STRESS STAGE 1: 1
BH CV THREE MINUTE POST TECH DATA BLOOD PRESSURE: NORMAL MMHG
BH CV THREE MINUTE POST TECH DATA HEART RATE: 63 BPM
BH CV THREE MINUTE POST TECH DATA OXYGEN SATURATION: 97 %
MAXIMAL PREDICTED HEART RATE: 142 BPM
PERCENT MAX PREDICTED HR: 72.54 %
SPECT HRT GATED+EF W RNC IV: 59 %
STRESS BASELINE BP: NORMAL MMHG
STRESS BASELINE HR: 52 BPM
STRESS O2 SAT REST: 96 %
STRESS PERCENT HR: 85 %
STRESS POST O2 SAT PEAK: 93 %
STRESS POST PEAK BP: NORMAL MMHG
STRESS POST PEAK HR: 103 BPM
STRESS TARGET HR: 121 BPM

## 2025-07-11 NOTE — TELEPHONE ENCOUNTER
Per Varsha:  Stress test shows no sign of blockage in coronary vessels. Follow up as scheduled. Notify office of any new/worsening cardiac symptoms.    Spoke with patient, patient is aware and verbalized understanding.

## 2025-07-15 ENCOUNTER — LAB (OUTPATIENT)
Dept: LAB | Facility: HOSPITAL | Age: 80
End: 2025-07-15
Payer: MEDICARE

## 2025-07-15 DIAGNOSIS — D89.89 KAPPA LIGHT CHAIN DISEASE: ICD-10-CM

## 2025-07-15 LAB
ALBUMIN SERPL-MCNC: 4.2 G/DL (ref 3.5–5.2)
ALBUMIN/GLOB SERPL: 1.4 G/DL
ALP SERPL-CCNC: 58 U/L (ref 39–117)
ALT SERPL W P-5'-P-CCNC: 15 U/L (ref 1–41)
ANION GAP SERPL CALCULATED.3IONS-SCNC: 12 MMOL/L (ref 5–15)
AST SERPL-CCNC: 20 U/L (ref 1–40)
BASOPHILS # BLD AUTO: 0.01 10*3/MM3 (ref 0–0.2)
BASOPHILS NFR BLD AUTO: 0.2 % (ref 0–1.5)
BILIRUB SERPL-MCNC: 0.3 MG/DL (ref 0–1.2)
BUN SERPL-MCNC: 13.3 MG/DL (ref 8–23)
BUN/CREAT SERPL: 9.1 (ref 7–25)
CALCIUM SPEC-SCNC: 9 MG/DL (ref 8.6–10.5)
CHLORIDE SERPL-SCNC: 104 MMOL/L (ref 98–107)
CO2 SERPL-SCNC: 22 MMOL/L (ref 22–29)
CREAT SERPL-MCNC: 1.46 MG/DL (ref 0.76–1.27)
DEPRECATED RDW RBC AUTO: 52.7 FL (ref 37–54)
EGFRCR SERPLBLD CKD-EPI 2021: 48.9 ML/MIN/1.73
EOSINOPHIL # BLD AUTO: 0.08 10*3/MM3 (ref 0–0.4)
EOSINOPHIL NFR BLD AUTO: 1.5 % (ref 0.3–6.2)
ERYTHROCYTE [DISTWIDTH] IN BLOOD BY AUTOMATED COUNT: 17.2 % (ref 12.3–15.4)
GLOBULIN UR ELPH-MCNC: 3.1 GM/DL
GLUCOSE SERPL-MCNC: 90 MG/DL (ref 65–99)
HCT VFR BLD AUTO: 39.6 % (ref 37.5–51)
HGB BLD-MCNC: 12.8 G/DL (ref 13–17.7)
IMM GRANULOCYTES # BLD AUTO: 0.01 10*3/MM3 (ref 0–0.05)
IMM GRANULOCYTES NFR BLD AUTO: 0.2 % (ref 0–0.5)
LYMPHOCYTES # BLD AUTO: 1.13 10*3/MM3 (ref 0.7–3.1)
LYMPHOCYTES NFR BLD AUTO: 20.6 % (ref 19.6–45.3)
MCH RBC QN AUTO: 26.9 PG (ref 26.6–33)
MCHC RBC AUTO-ENTMCNC: 32.3 G/DL (ref 31.5–35.7)
MCV RBC AUTO: 83.2 FL (ref 79–97)
MONOCYTES # BLD AUTO: 0.56 10*3/MM3 (ref 0.1–0.9)
MONOCYTES NFR BLD AUTO: 10.2 % (ref 5–12)
NEUTROPHILS NFR BLD AUTO: 3.7 10*3/MM3 (ref 1.7–7)
NEUTROPHILS NFR BLD AUTO: 67.3 % (ref 42.7–76)
NRBC BLD AUTO-RTO: 0 /100 WBC (ref 0–0.2)
PLATELET # BLD AUTO: 240 10*3/MM3 (ref 140–450)
PMV BLD AUTO: 10.8 FL (ref 6–12)
POTASSIUM SERPL-SCNC: 4.3 MMOL/L (ref 3.5–5.2)
PROT SERPL-MCNC: 7.3 G/DL (ref 6–8.5)
RBC # BLD AUTO: 4.76 10*6/MM3 (ref 4.14–5.8)
SODIUM SERPL-SCNC: 138 MMOL/L (ref 136–145)
WBC NRBC COR # BLD AUTO: 5.49 10*3/MM3 (ref 3.4–10.8)

## 2025-07-15 PROCEDURE — 86334 IMMUNOFIX E-PHORESIS SERUM: CPT

## 2025-07-15 PROCEDURE — 84166 PROTEIN E-PHORESIS/URINE/CSF: CPT

## 2025-07-15 PROCEDURE — 85025 COMPLETE CBC W/AUTO DIFF WBC: CPT

## 2025-07-15 PROCEDURE — 36415 COLL VENOUS BLD VENIPUNCTURE: CPT

## 2025-07-15 PROCEDURE — 83521 IG LIGHT CHAINS FREE EACH: CPT

## 2025-07-15 PROCEDURE — 84156 ASSAY OF PROTEIN URINE: CPT

## 2025-07-15 PROCEDURE — 82784 ASSAY IGA/IGD/IGG/IGM EACH: CPT

## 2025-07-15 PROCEDURE — 86335 IMMUNFIX E-PHORSIS/URINE/CSF: CPT

## 2025-07-15 PROCEDURE — 80053 COMPREHEN METABOLIC PANEL: CPT

## 2025-07-15 PROCEDURE — 84165 PROTEIN E-PHORESIS SERUM: CPT

## 2025-07-18 LAB
ALBUMIN 24H MFR UR ELPH: 21.2 %
ALBUMIN SERPL ELPH-MCNC: 3.7 G/DL (ref 2.9–4.4)
ALBUMIN/GLOB SERPL: 1.3 {RATIO} (ref 0.7–1.7)
ALPHA1 GLOB 24H MFR UR ELPH: 4.1 %
ALPHA1 GLOB SERPL ELPH-MCNC: 0.3 G/DL (ref 0–0.4)
ALPHA2 GLOB 24H MFR UR ELPH: 22.7 %
ALPHA2 GLOB SERPL ELPH-MCNC: 0.7 G/DL (ref 0.4–1)
B-GLOBULIN MFR UR ELPH: 28.9 %
B-GLOBULIN SERPL ELPH-MCNC: 0.9 G/DL (ref 0.7–1.3)
GAMMA GLOB 24H MFR UR ELPH: 23.1 %
GAMMA GLOB SERPL ELPH-MCNC: 1.1 G/DL (ref 0.4–1.8)
GLOBULIN SER-MCNC: 3 G/DL (ref 2.2–3.9)
IGA SERPL-MCNC: 357 MG/DL (ref 61–437)
IGG SERPL-MCNC: 1156 MG/DL (ref 603–1613)
IGM SERPL-MCNC: 64 MG/DL (ref 15–143)
INTERPRETATION SERPL IEP-IMP: ABNORMAL
INTERPRETATION UR IFE-IMP: NORMAL
KAPPA LC FREE SER-MCNC: 28.5 MG/L (ref 3.3–19.4)
KAPPA LC FREE UR-MCNC: 32.99 MG/L (ref 1.17–86.46)
KAPPA LC FREE/LAMBDA FREE SER: 1.65 {RATIO} (ref 0.26–1.65)
KAPPA LC FREE/LAMBDA FREE UR: 8.84 (ref 1.83–14.26)
LABORATORY COMMENT REPORT: ABNORMAL
LAMBDA LC FREE SERPL-MCNC: 17.3 MG/L (ref 5.7–26.3)
LAMBDA LC FREE UR-MCNC: 3.73 MG/L (ref 0.27–15.21)
M PROTEIN 24H MFR UR ELPH: NORMAL %
M PROTEIN SERPL ELPH-MCNC: ABNORMAL G/DL
PROT SERPL-MCNC: 6.7 G/DL (ref 6–8.5)
PROT UR-MCNC: 6.4 MG/DL
SERVICE CMNT-IMP: NORMAL

## 2025-07-20 NOTE — PROGRESS NOTES
Chief Complaint/Reason for Referral:  Anemia    Kenzie Mccloud, Kenzie Rabago, CARSON    Records Obtained:  Records of the patients history including those obtained from [facility] EMR were reviewed and summarized in detail.    Subjective    History of Present Illness    Reji Mcmillan 79 y.o. presents to [facility] HEMATOLOGY [facility] ONCOLOGY for Microcytic Anemia    History of Present Illness  The patient is a 79-year-old male who presents to the hematology oncology department as a new patient for further evaluation of his iron deficiency anemia.    He has been experiencing intermittent anemia for several years, which he attributes to a familial predisposition. His mother was anemic, and both his daughter and granddaughter have low iron levels. Despite his anemia, he has not required iron infusions or blood transfusions. He has a history of blood donation, with the most recent instance occurring in 02/2025. He has been advised against further blood donation due to his condition.    He reports fatigue, low energy levels, and decreased stamina. He is currently on oral iron supplements, which have resulted in constipation. He has expressed interest in intravenous iron therapy as a potential treatment option.    He is scheduled for an MRI of his prostate with Dr. Dawson, who is monitoring him for prostatitis. He recently consulted with his nephrologist, who reported an improvement in his condition.    FAMILY HISTORY  His mother was anemic. His daughter and granddaughter have low iron levels.    7/21/25:  The patient is a 79-year-old male who presents to the hematology oncology department for follow-up on his iron deficiency anemia.    He reports feeling slightly better following his iron infusion, with a noticeable improvement in his chest pain and pressure. He has a history of regular blood donation but ceased this activity earlier this year.    He is currently under the care of a nephrologist for  stage 3 chronic kidney disease, which has shown some improvement.     Cancer-related family history is not on file.     Oncology/Hematology History    No history exists.     Review of Systems    Current Outpatient Medications on File Prior to Visit   Medication Sig Dispense Refill    amLODIPine (NORVASC) 5 MG tablet TAKE 1 TABLET DAILY 90 tablet 3    aspirin 81 MG EC tablet Take 1 tablet by mouth Daily.      atorvastatin (LIPITOR) 80 MG tablet TAKE 1 TABLET DAILY 90 tablet 3    cetirizine (zyrTEC) 10 MG tablet Take 1 tablet by mouth Daily.      Dupilumab (Dupixent) 300 MG/2ML solution pen-injector Inject 1 mL under the skin into the appropriate area as directed Every 14 (Fourteen) Days.      FLUoxetine (PROzac) 20 MG capsule TAKE 1 CAPSULE DAILY (REPLACES 10 MG DOSE) 90 capsule 3    ketoconazole (NIZORAL) 2 % cream Apply 1 application  topically to the appropriate area as directed Daily. 30 g 1    lansoprazole (PREVACID) 30 MG capsule TAKE 1 CAPSULE DAILY 90 capsule 3    levothyroxine (SYNTHROID, LEVOTHROID) 25 MCG tablet TAKE 1 TABLET DAILY 90 tablet 3    losartan (COZAAR) 50 MG tablet Take 1 tablet by mouth Daily.      mirtazapine (REMERON) 30 MG tablet TAKE 1 TABLET EVERY NIGHT (REPLACES 22.5 MG DOSE) 90 tablet 3    multivitamin with minerals tablet tablet Take 1 tablet by mouth Daily.      ranolazine (RANEXA) 500 MG 12 hr tablet Take 1 tablet by mouth 2 (Two) Times a Day. 180 tablet 3    tamsulosin (FLOMAX) 0.4 MG capsule 24 hr capsule TAKE 1 CAPSULE DAILY 90 capsule 3    traZODone (DESYREL) 50 MG tablet Take 1 tablet by mouth Every Night. 30 tablet 2    vitamin B-12 (CYANOCOBALAMIN) 1000 MCG tablet Take 1 tablet by mouth Daily. 90 tablet 1    tadalafil (CIALIS) 5 MG tablet Take 1 tablet by mouth Daily As Needed for Erectile Dysfunction for up to 180 days. 90 tablet 1     No current facility-administered medications on file prior to visit.     Allergies   Allergen Reactions    Sulfa Antibiotics Unknown - Low  Severity     Flu like symptoms caused from taking medication     Past Medical History:   Diagnosis Date    Allergic rhinitis due to allergen     Benign prostatic hyperplasia     CAD s/p CABG 03/30/2022    Chest pain     Chronic allergic rhinitis     CKD (chronic kidney disease) stage 3, GFR 30-59 ml/min     Eczema     Erectile dysfunction 10/19/2020    GERD (gastroesophageal reflux disease)     Heart disease     High blood pressure     High cholesterol     Hypercholesteremia     Hypertension     Controlled    Hypothyroidism     Iron (Fe) deficiency anemia     Orthostatic hypotension 03/17/2022    Polio     Prostate disorder      Past Surgical History:   Procedure Laterality Date    CARDIAC SURGERY      CHOLECYSTECTOMY      COLONOSCOPY      COLONOSCOPY N/A 3/20/2023    Procedure: COLONOSCOPY;  Surgeon: Jacques Dc MD;  Location: [facility] ENDOSCOPY;  Service: General;  Laterality: N/A;  diverticulosis, hemorrhoids    CORONARY ARTERY BYPASS GRAFT      cabbage x2    DENTAL PROCEDURE      HERNIA REPAIR      TOE SURGERY Left      Social History     Socioeconomic History    Marital status:    Tobacco Use    Smoking status: Never     Passive exposure: Never    Smokeless tobacco: Never   Vaping Use    Vaping status: Never Used   Substance and Sexual Activity    Alcohol use: Yes     Comment: 2-3 times weekly due to stress.    Drug use: Never    Sexual activity: Defer     Family History   Problem Relation Age of Onset    Dementia Mother     Anxiety disorder Mother     Hyperlipidemia Mother     Heart disease Mother     Hypertension Mother     Hyperlipidemia Father     Hypertension Father     Heart attack Father     Depression Sister     Anxiety disorder Sister     No Known Problems Brother     No Known Problems Maternal Aunt     No Known Problems Paternal Aunt     No Known Problems Maternal Uncle     No Known Problems Paternal Uncle     No Known Problems Maternal Grandfather     No Known Problems Maternal Grandmother  "    No Known Problems Paternal Grandfather     No Known Problems Paternal Grandmother     No Known Problems Cousin     No Known Problems Other     Malig Hyperthermia Neg Hx     ADD / ADHD Neg Hx     Alcohol abuse Neg Hx     Bipolar disorder Neg Hx     Drug abuse Neg Hx     OCD Neg Hx     Paranoid behavior Neg Hx     Schizophrenia Neg Hx     Seizures Neg Hx     Self-Injurious Behavior  Neg Hx     Suicide Attempts Neg Hx      Immunization History   Administered Date(s) Administered    COVID-19 (MODERNA) 12YRS+ (SPIKEVAX) 10/19/2024    COVID-19 (MODERNA) 1st,2nd,3rd Dose Monovalent 01/12/2021, 01/12/2021, 02/09/2021, 02/09/2021, 10/26/2021, 11/05/2021    COVID-19 (MODERNA) BIVALENT 12+YRS 12/06/2022    COVID-19 (MODERNA) Monovalent Original Booster 01/12/2021, 02/09/2021, 11/05/2021    Fluzone High-Dose 65+YRS 10/12/2021, 10/12/2021, 10/19/2024    Fluzone High-Dose 65+yrs 10/10/2022, 10/23/2023    Fluzone Quad >6mos (Multi-dose) 09/08/2020    Influenza, Unspecified 10/06/2021    Pneumococcal Conjugate 20-Valent (PCV20) 10/10/2022     Tobacco Use: Low Risk  (7/21/2025)    Patient History     Smoking Tobacco Use: Never     Smokeless Tobacco Use: Never     Passive Exposure: Never     Objective   Vitals:    07/21/25 0847   BP: 126/65   Pulse: 63   Resp: 16   TempSrc: Oral   SpO2: 97%   Weight: 87.5 kg (193 lb)   Height: 177.8 cm (70\")   PainSc: 0-No pain     Physical Exam  Vitals and nursing note reviewed.   Constitutional:       General: He is not in acute distress.     Appearance: Normal appearance. He is not ill-appearing.   HENT:      Head: Normocephalic.   Eyes:      Conjunctiva/sclera: Conjunctivae normal.   Cardiovascular:      Rate and Rhythm: Normal rate and regular rhythm.      Heart sounds: Normal heart sounds.   Pulmonary:      Effort: Pulmonary effort is normal.      Breath sounds: Normal breath sounds.   Skin:     Coloration: Skin is not jaundiced.   Neurological:      Mental Status: He is alert. "   Psychiatric:         Mood and Affect: Mood normal.         Behavior: Behavior normal. Behavior is cooperative.         Thought Content: Thought content normal.         Judgment: Judgment normal.       Wt Readings from Last 3 Encounters:   07/21/25 87.5 kg (193 lb)   06/18/25 86.1 kg (189 lb 12.8 oz)   05/29/25 87.3 kg (192 lb 7.4 oz)      Body mass index is 27.69 kg/m².    ECOG score: 0         ECOG: (0) Fully Active - Able to Carry On All Pre-disease Performance Without Restriction  Fall Risk Assessment was completed, and patient is at low risk for falls.  PHQ-9 Total Score:         The patient is  experiencing fatigue. Fatigue score: 8    PT/OT Functional Screening:   Speech Functional Screening:   Rehab to be ordered:     Vitals:    07/21/25 0847   PainSc: 0-No pain             Reji ST Hipolito reports a pain score of 0.      PHQ-2 Depression Screening  Little interest or pleasure in doing things? Not at all   Feeling down, depressed, or hopeless? Several days   PHQ-2 Total Score 1     Result Review :  The following data was reviewed by: Jr Liriano PA-C on 05/13/2025:  RED BLOOD CELLs:  Lab Results   Component Value Date    RBC 4.76 07/15/2025    HGB 12.8 (L) 07/15/2025    HCT 39.6 07/15/2025    MCV 83.2 07/15/2025     07/15/2025      WHITE BLOOD CELLs:  Lab Results   Component Value Date    WBC 5.49 07/15/2025    NEUTROABS 3.70 07/15/2025    LYMPHSABS 1.13 07/15/2025    MONOABS 0.56 05/13/2025    EOSABS 0.08 07/15/2025    BASOSABS 0.01 07/15/2025     RBC EVALUATION (MICROCYTOSIS/NORMOCYTOSIS):  Lab Results   Component Value Date    RETIC 0.0832 05/13/2025    MCV 83.2 07/15/2025    IRON 89 05/13/2025    FERRITIN 33.07 05/13/2025    LABIRON 21 05/13/2025    TIBC 426 05/13/2025    TRANSFERRIN 286 05/13/2025     HEMOLYSIS EVALUATION:  Lab Results   Component Value Date    MCV 83.2 07/15/2025     05/13/2025    HAPTOGLOBIN 206 (H) 05/13/2025    RETIC 0.0832 05/13/2025     MACROCYTOSIS  EVALUATION:  Lab Results   Component Value Date    RETIC 0.0832 05/13/2025    MCV 83.2 07/15/2025    [id number] 362 05/13/2025    FOLATE 7.45 05/13/2025     RENAL FUNCTION TESTs:  Lab Results   Component Value Date    EGFR 48.9 (L) 07/15/2025    BUN 13.3 07/15/2025     LIVER FUNCTION TESTs:  Lab Results   Component Value Date    ALT 15 07/15/2025    AST 20 07/15/2025    BILITOT 0.3 07/15/2025    BILIDIR <0.2 11/02/2023    ALKPHOS 58 07/15/2025    PROTEINTOT 7.3 07/15/2025    PROTEINTOT 6.7 07/15/2025    ALBUMIN 4.2 07/15/2025    ALBUMIN 3.7 07/15/2025     GLUCOSE EVALUATION:  Lab Results   Component Value Date    GLUCOSE 90 07/15/2025    HGBA1C 5.40 04/15/2025     SERUM CHEMISTRIES:  Lab Results   Component Value Date     07/15/2025    K 4.3 07/15/2025     07/15/2025    CO2 22.0 07/15/2025    CALCIUM 9.0 07/15/2025     URINALYSIS:  Lab Results   Component Value Date    PROTEINUR 16.0 04/15/2025    RBCUR Negative 11/05/2024    LEUKOCYTESUR Negative 05/15/2025    BILIRUBINUR Negative 05/15/2025    PHUR 7.0 05/15/2025    SPECGRAVUR 1.027 05/15/2025     THYROID FUNCTION TESTs:  TSH   Date Value Ref Range Status   04/15/2025 3.050 0.270 - 4.200 uIU/mL Final     Free T4   Date Value Ref Range Status   04/15/2025 1.23 0.92 - 1.68 ng/dL Final     TUMOR MARKERS:  Lab Results   Component Value Date    PSA 4.760 (H) 09/26/2024     No Images in the past 120 days found..    Results  Labs   - White blood cell count: 04/15/2025, 5.4   - White blood cell count: 6.1   - Platelet count: 04/15/2025, 295   - Platelet count: 252   - Hemoglobin: 03/29/2024, 13.2   - Hemoglobin: 12.3   - Hemoglobin: 04/15/2025, 10.2   - Ferritin: 31   - Blood sugar: 91   - Creatinine: 1.4      Labs   - Iron level: Low at 24 and 5, after iron infusion increased to 89 and 21   - Ferritin: Increased from 14 to 33   - Hemoglobin: Increased from 10.2 to 12.8   - Folic acid: Normal   - B12: Normal   - CMP: Shows a little bit of a kidney issue   -  Occult blood in stool: Negative   - Urine test: Did not show blood   - H. pylori: Negative   - Autoimmune check: Negative       Assessment and Plan:  Diagnoses and all orders for this visit:    1. Microcytic anemia (Primary)  -     Erythropoietin; Future  -     Vitamin B12 & Folate; Future  -     CBC & Differential; Future  -     Ferritin; Future  -     Iron Profile w/o Ferritin; Future    2. Iron deficiency  -     Erythropoietin; Future  -     Vitamin B12 & Folate; Future  -     CBC & Differential; Future  -     Ferritin; Future  -     Iron Profile w/o Ferritin; Future    3. B12 deficiency  Comments:  Low normal  Orders:  -     Erythropoietin; Future  -     Vitamin B12 & Folate; Future  -     CBC & Differential; Future  -     Ferritin; Future  -     Iron Profile w/o Ferritin; Future    4. Kappa light chain disease  Comments:  Recheck in 1 year  Orders:  -     Erythropoietin; Future    5. Stage 3a chronic kidney disease  Comments:  Continue [facility] - Nephrology  Orders:  -     Erythropoietin; Future  -     Vitamin B12 & Folate; Future  -     CBC & Differential; Future  -     Ferritin; Future  -     Iron Profile w/o Ferritin; Future      Assessment & Plan  Iron deficiency anemia  - Hemoglobin levels decreased from 13.2 on 03/29/2024 to 10.2 on 04/15/2025 (loss of ~2 units of blood)  - Possible relation to blood donation in February 2025  - Ferritin level last recorded as low at 31  - Intravenous iron infusions to be ordered, pending insurance approval (expected to take 2400 to 4800 hours)  - Patient will be contacted to schedule infusion once approval is received  - Continue oral iron supplements until infusions begin  - Intravenous iron bypasses gastrointestinal tract, avoiding side effects like constipation and nausea  - Goal: replenish iron stores, improve hemoglobin levels, alleviate symptoms of fatigue and low energy  - Advise patient to avoid further blood donations to prevent exacerbation of anemia      Iron  Deficiency Anemia  - Iron levels have significantly improved following the iron infusion  - Hemoglobin increased from 10.2 to 12.8  - All tests conducted were negative, suggesting anemia could be related to kidney function  - Kidney function has shown improvement, contributing to the increase in hemoglobin levels  - Blood donation discontinued  - Avoid anti-inflammatory medications (ibuprofen, Aleve, Advil, Motrin, Voltaren, diclofenac)  - Tylenol recommended for pain management  - Inform healthcare providers about kidney condition to ensure medications are metabolized by the liver rather than the kidneys  - Adequate hydration and avoiding sun exposure to prevent dehydration are essential to prevent potential kidney failure    Chronic Kidney Disease Stage 3  - Currently under the care of a nephrologist  - Monitoring kidney function and avoiding medications that can harm the kidneys  - Staying hydrated and avoiding dehydration are crucial to prevent further kidney damage    Follow-up  - A follow-up visit is scheduled in 3 months for lab recheck    -Encouraged patient to remain up to date on all recommended preventative medicine services specific for their sex and age.  Some examples include:  Diabetes screening, Hypertensive screening, Immunizations, Lipid screenings (cholesterol), Depression screenings, Colorectal cancer screening, HIV screening, Cervical cancer screening, Breast cancer screening, Osteoporosis screening, Alcohol screening, Dental screening, Tobacco Use screening and Dietary screening    Patient Follow Up: 12 weeks with MD VERONICA spent 30 minutes caring for Reji on this date of service. This time includes time spent by me in the following activities:preparing for the visit, reviewing tests, obtaining and/or reviewing a separately obtained history, performing a medically appropriate examination and/or evaluation , counseling and educating the patient/family/caregiver, ordering medications, tests, or  procedures, documenting information in the medical record, independently interpreting results and communicating that information with the patient/family/caregiver, and care coordination    Patient was given instructions and counseling regarding his condition or for health maintenance advice. Please see specific information pulled into the AVS if appropriate.     Patient or patient representative verbalized consent for the use of Ambient Listening during the visit with  Jr Liriano PA-C for chart documentation. 7/21/2025  09:04 EDT

## 2025-07-21 ENCOUNTER — OFFICE VISIT (OUTPATIENT)
Dept: CARDIOLOGY | Facility: CLINIC | Age: 80
End: 2025-07-21
Payer: MEDICARE

## 2025-07-21 ENCOUNTER — OFFICE VISIT (OUTPATIENT)
Dept: ONCOLOGY | Facility: HOSPITAL | Age: 80
End: 2025-07-21
Payer: MEDICARE

## 2025-07-21 VITALS
WEIGHT: 193 LBS | SYSTOLIC BLOOD PRESSURE: 126 MMHG | OXYGEN SATURATION: 97 % | RESPIRATION RATE: 16 BRPM | DIASTOLIC BLOOD PRESSURE: 65 MMHG | BODY MASS INDEX: 27.63 KG/M2 | HEART RATE: 63 BPM | HEIGHT: 70 IN

## 2025-07-21 VITALS
BODY MASS INDEX: 27.41 KG/M2 | DIASTOLIC BLOOD PRESSURE: 74 MMHG | WEIGHT: 191.5 LBS | OXYGEN SATURATION: 96 % | HEART RATE: 59 BPM | HEIGHT: 70 IN | SYSTOLIC BLOOD PRESSURE: 144 MMHG

## 2025-07-21 DIAGNOSIS — E53.8 B12 DEFICIENCY: ICD-10-CM

## 2025-07-21 DIAGNOSIS — E61.1 IRON DEFICIENCY: ICD-10-CM

## 2025-07-21 DIAGNOSIS — I25.810 CORONARY ARTERY DISEASE INVOLVING CORONARY BYPASS GRAFT OF NATIVE HEART WITHOUT ANGINA PECTORIS: Primary | ICD-10-CM

## 2025-07-21 DIAGNOSIS — D89.89 KAPPA LIGHT CHAIN DISEASE: ICD-10-CM

## 2025-07-21 DIAGNOSIS — D50.9 MICROCYTIC ANEMIA: Primary | ICD-10-CM

## 2025-07-21 DIAGNOSIS — N18.31 STAGE 3A CHRONIC KIDNEY DISEASE: ICD-10-CM

## 2025-07-21 PROCEDURE — 99214 OFFICE O/P EST MOD 30 MIN: CPT | Performed by: PHYSICIAN ASSISTANT

## 2025-07-21 PROCEDURE — 99213 OFFICE O/P EST LOW 20 MIN: CPT | Performed by: NURSE PRACTITIONER

## 2025-07-21 PROCEDURE — 1160F RVW MEDS BY RX/DR IN RCRD: CPT | Performed by: PHYSICIAN ASSISTANT

## 2025-07-21 PROCEDURE — 1159F MED LIST DOCD IN RCRD: CPT | Performed by: NURSE PRACTITIONER

## 2025-07-21 PROCEDURE — 1126F AMNT PAIN NOTED NONE PRSNT: CPT | Performed by: PHYSICIAN ASSISTANT

## 2025-07-21 PROCEDURE — 1160F RVW MEDS BY RX/DR IN RCRD: CPT | Performed by: NURSE PRACTITIONER

## 2025-07-21 PROCEDURE — 3078F DIAST BP <80 MM HG: CPT | Performed by: NURSE PRACTITIONER

## 2025-07-21 PROCEDURE — 1159F MED LIST DOCD IN RCRD: CPT | Performed by: PHYSICIAN ASSISTANT

## 2025-07-21 PROCEDURE — 3077F SYST BP >= 140 MM HG: CPT | Performed by: NURSE PRACTITIONER

## 2025-07-21 PROCEDURE — G0463 HOSPITAL OUTPT CLINIC VISIT: HCPCS | Performed by: PHYSICIAN ASSISTANT

## 2025-07-21 PROCEDURE — 3078F DIAST BP <80 MM HG: CPT | Performed by: PHYSICIAN ASSISTANT

## 2025-07-21 PROCEDURE — 3074F SYST BP LT 130 MM HG: CPT | Performed by: PHYSICIAN ASSISTANT

## 2025-07-21 NOTE — PROGRESS NOTES
Chief Complaint  Coronary artery disease involving coronary bypass graft of  and Follow-up    Subjective            History of Present Illness  Reji Mcmillan is a 79-year-old male patient who presents to the office today for follow up.    History of Present Illness  The patient presents for evaluation of chest tightness and trouble with breathing.    He reports an improvement in his condition following a diagnosis of low iron levels, which was subsequently treated with an infusion. He is considering discontinuing Ranexa temporarily to observe if his symptoms recur. He has a family history of heart disease and anticipates that he may require stents in the future.     FAMILY HISTORY  - Family history of heart trouble        PMH  Past Medical History:   Diagnosis Date    Allergic rhinitis due to allergen     Benign prostatic hyperplasia     CAD s/p CABG 03/30/2022    Chest pain     Chronic allergic rhinitis     CKD (chronic kidney disease) stage 3, GFR 30-59 ml/min     Eczema     Erectile dysfunction 10/19/2020    GERD (gastroesophageal reflux disease)     Heart disease     High blood pressure     High cholesterol     Hypercholesteremia     Hypertension     Controlled    Hypothyroidism     Iron (Fe) deficiency anemia     Orthostatic hypotension 03/17/2022    Polio     Prostate disorder          ALLERGY  Allergies   Allergen Reactions    Sulfa Antibiotics Unknown - Low Severity     Flu like symptoms caused from taking medication          SURGICALHX  Past Surgical History:   Procedure Laterality Date    CARDIAC SURGERY      CHOLECYSTECTOMY      COLONOSCOPY      COLONOSCOPY N/A 3/20/2023    Procedure: COLONOSCOPY;  Surgeon: Jacques Dc MD;  Location: [facility] ENDOSCOPY;  Service: General;  Laterality: N/A;  diverticulosis, hemorrhoids    CORONARY ARTERY BYPASS GRAFT      cabbage x2    DENTAL PROCEDURE      HERNIA REPAIR      TOE SURGERY Left           SOC  Social History     Socioeconomic History    Marital  status:    Tobacco Use    Smoking status: Never     Passive exposure: Never    Smokeless tobacco: Never   Vaping Use    Vaping status: Never Used   Substance and Sexual Activity    Alcohol use: Yes     Comment: 2-3 times weekly due to stress.    Drug use: Never    Sexual activity: Defer         FAMHX  Family History   Problem Relation Age of Onset    Dementia Mother     Anxiety disorder Mother     Hyperlipidemia Mother     Heart disease Mother     Hypertension Mother     Hyperlipidemia Father     Hypertension Father     Heart attack Father     Depression Sister     Anxiety disorder Sister     No Known Problems Brother     No Known Problems Maternal Aunt     No Known Problems Paternal Aunt     No Known Problems Maternal Uncle     No Known Problems Paternal Uncle     No Known Problems Maternal Grandfather     No Known Problems Maternal Grandmother     No Known Problems Paternal Grandfather     No Known Problems Paternal Grandmother     No Known Problems Cousin     No Known Problems Other     Malig Hyperthermia Neg Hx     ADD / ADHD Neg Hx     Alcohol abuse Neg Hx     Bipolar disorder Neg Hx     Drug abuse Neg Hx     OCD Neg Hx     Paranoid behavior Neg Hx     Schizophrenia Neg Hx     Seizures Neg Hx     Self-Injurious Behavior  Neg Hx     Suicide Attempts Neg Hx           MEDSIGONLY  Current Outpatient Medications on File Prior to Visit   Medication Sig    amLODIPine (NORVASC) 5 MG tablet TAKE 1 TABLET DAILY    aspirin 81 MG EC tablet Take 1 tablet by mouth Daily.    atorvastatin (LIPITOR) 80 MG tablet TAKE 1 TABLET DAILY    cetirizine (zyrTEC) 10 MG tablet Take 1 tablet by mouth Daily.    Dupilumab (Dupixent) 300 MG/2ML solution pen-injector Inject 1 mL under the skin into the appropriate area as directed Every 14 (Fourteen) Days.    FLUoxetine (PROzac) 20 MG capsule TAKE 1 CAPSULE DAILY (REPLACES 10 MG DOSE)    ketoconazole (NIZORAL) 2 % cream Apply 1 application  topically to the appropriate area as directed  "Daily.    lansoprazole (PREVACID) 30 MG capsule TAKE 1 CAPSULE DAILY    levothyroxine (SYNTHROID, LEVOTHROID) 25 MCG tablet TAKE 1 TABLET DAILY    losartan (COZAAR) 50 MG tablet Take 1 tablet by mouth Daily.    mirtazapine (REMERON) 30 MG tablet TAKE 1 TABLET EVERY NIGHT (REPLACES 22.5 MG DOSE)    ranolazine (RANEXA) 500 MG 12 hr tablet Take 1 tablet by mouth 2 (Two) Times a Day.    tamsulosin (FLOMAX) 0.4 MG capsule 24 hr capsule TAKE 1 CAPSULE DAILY    traZODone (DESYREL) 50 MG tablet Take 1 tablet by mouth Every Night.    vitamin B-12 (CYANOCOBALAMIN) 1000 MCG tablet Take 1 tablet by mouth Daily.    multivitamin with minerals tablet tablet Take 1 tablet by mouth Daily. (Patient not taking: Reported on 7/21/2025)    tadalafil (CIALIS) 5 MG tablet Take 1 tablet by mouth Daily As Needed for Erectile Dysfunction for up to 180 days.     No current facility-administered medications on file prior to visit.         Objective   /74   Pulse 59   Ht 177.8 cm (70\")   Wt 86.9 kg (191 lb 8 oz)   SpO2 96%   BMI 27.48 kg/m²       Physical Exam  HENT:      Head: Normocephalic.   Neck:      Vascular: No carotid bruit.   Cardiovascular:      Rate and Rhythm: Normal rate and regular rhythm.      Pulses: Normal pulses.      Heart sounds: Normal heart sounds. No murmur heard.  Pulmonary:      Effort: Pulmonary effort is normal.      Breath sounds: Normal breath sounds.   Musculoskeletal:      Cervical back: Neck supple.      Right lower leg: No edema.      Left lower leg: No edema.   Skin:     General: Skin is dry.   Neurological:      Mental Status: He is alert and oriented to person, place, and time.   Psychiatric:         Behavior: Behavior normal.         Result Review :   The following data was reviewed by: CARSON Malik on 07/21/2025:  No results found for: \"PROBNP\"  CMP          4/15/2025    12:33 5/13/2025    09:34 7/15/2025    11:47   CMP   Glucose 91  99  90    BUN 14  18  13.3    Creatinine 1.41  1.40  " "1.46    EGFR 51.0  51.4  48.9    Sodium 137  136  138    Potassium 4.4  4.5  4.3    Chloride 101  102  104    Calcium 9.0  9.2  9.0    Total Protein 7.2  7.6     7.1  7.3     6.7    Albumin 3.9  4.1     3.5  4.2     3.7    Globulin 3.3  3.5     3.6  3.1     3.0    Total Bilirubin 0.3  0.4  0.3    Alkaline Phosphatase 58  61  58    AST (SGOT) 15  15  20    ALT (SGPT) 9  12  15    Albumin/Globulin Ratio 1.2  1.2     1.0  1.4     1.3    BUN/Creatinine Ratio 9.9  12.9  9.1    Anion Gap 13.6  9.6  12.0      CBC w/diff          4/15/2025    12:33 5/13/2025    09:34 7/15/2025    11:47   CBC w/Diff   WBC 5.48  5.63  5.49    RBC 4.27  4.98  4.76    Hemoglobin 10.2  12.5  12.8    Hematocrit 32.3  39.9  39.6    MCV 75.6  80.1  83.2    MCH 23.9  25.1  26.9    MCHC 31.6  31.3  32.3    RDW 14.3  18.5  17.2    Platelets 295  243  240    Neutrophil Rel % 70.4  69.2  67.3    Immature Granulocyte Rel % 0.4  0.2  0.2    Lymphocyte Rel % 18.4  17.8  20.6    Monocyte Rel % 9.1  10.7  10.2    Eosinophil Rel % 1.5  1.6  1.5    Basophil Rel % 0.2  0.5  0.2       Lab Results   Component Value Date    TSH 3.050 04/15/2025      Lab Results   Component Value Date    FREET4 1.23 04/15/2025      No results found for: \"DDIMERQUANT\"  Magnesium   Date Value Ref Range Status   09/26/2024 1.9 1.6 - 2.4 mg/dL Final      No results found for: \"DIGOXIN\"   No results found for: \"TROPONINT\"        Lipid Panel          9/26/2024    11:47 4/15/2025    12:33   Lipid Panel   Total Cholesterol 137  129    Triglycerides 88  57    HDL Cholesterol 55  50    VLDL Cholesterol 17  12    LDL Cholesterol  65  67    LDL/HDL Ratio 1.17  1.35             Assessment and Plan    Diagnoses and all orders for this visit:    1. CAD s/p CABG (Primary)      Assessment & Plan  1. Coronary artery disease:  - Continue current medications: aspirin, atorvastatin 80 mg, amlodipine 5 mg daily, losartan 50 mg daily.  - Ranexa 500 mg twice daily can be held temporarily to assess its " impact on symptoms. Resume if symptoms recur.  - Amlodipine does not affect the kidneys and has antianginal properties.  - Report any worsening symptoms immediately.    Follow-up: Follow up with Dr. Mata in 6 months.      Follow Up   Return in about 6 months (around 1/21/2026) for Follow up with Dr Mata.    Patient was given instructions and counseling regarding his condition or for health maintenance advice. Please see specific information pulled into the AVS if appropriate.     Reji MACIEL Mcmillan  reports that he has never smoked. He has never been exposed to tobacco smoke. He has never used smokeless tobacco.          Patient or patient representative verbalized consent for the use of Ambient Listening during the visit with  CARSON Malik for chart documentation. 7/21/2025  12:54 EDT    CARSON Malik  07/21/25  12:07 EDT    Dictated Utilizing Dragon Dictation

## 2025-07-30 ENCOUNTER — TELEPHONE (OUTPATIENT)
Dept: UROLOGY | Age: 80
End: 2025-07-30
Payer: MEDICARE

## 2025-07-30 DIAGNOSIS — N40.3 PROSTATIC NODULE WITH OBSTRUCTION: ICD-10-CM

## 2025-07-30 DIAGNOSIS — N13.8 PROSTATIC NODULE WITH OBSTRUCTION: ICD-10-CM

## 2025-07-30 DIAGNOSIS — N52.01 ERECTILE DYSFUNCTION DUE TO ARTERIAL INSUFFICIENCY: ICD-10-CM

## 2025-07-30 DIAGNOSIS — N48.6 PEYRONIE'S DISEASE: ICD-10-CM

## 2025-07-30 RX ORDER — TADALAFIL 5 MG/1
5 TABLET ORAL DAILY PRN
Qty: 90 TABLET | Refills: 1 | Status: SHIPPED | OUTPATIENT
Start: 2025-07-30 | End: 2026-01-26

## 2025-07-30 NOTE — TELEPHONE ENCOUNTER
Pt answered.     I let the Pt know their refill request was sent in. Pt verbalized understanding.

## 2025-08-11 ENCOUNTER — HOSPITAL ENCOUNTER (OUTPATIENT)
Dept: MRI IMAGING | Facility: HOSPITAL | Age: 80
Discharge: HOME | End: 2025-08-11
Admitting: UROLOGY
Payer: MEDICARE

## 2025-08-11 DIAGNOSIS — R97.20 ELEVATED PSA: ICD-10-CM

## 2025-08-11 PROCEDURE — 72197 MRI PELVIS W/O & W/DYE: CPT

## 2025-08-11 PROCEDURE — A9573 GADOPICLENOL 0.5 MMOL/ML SOLUTION: HCPCS | Performed by: UROLOGY

## 2025-08-11 PROCEDURE — 25510000001 GADOPICLENOL 0.5 MMOL/ML SOLUTION: Performed by: UROLOGY

## 2025-08-11 RX ADMIN — GADOPICLENOL 10 ML: 485.1 INJECTION INTRAVENOUS at 10:17

## 2025-08-12 ENCOUNTER — LAB (OUTPATIENT)
Dept: LAB | Facility: HOSPITAL | Age: 80
End: 2025-08-12
Payer: MEDICARE

## 2025-08-12 DIAGNOSIS — R97.20 ELEVATED PSA: ICD-10-CM

## 2025-08-12 LAB — PSA SERPL-MCNC: 4.15 NG/ML (ref 0–4)

## 2025-08-12 PROCEDURE — 36415 COLL VENOUS BLD VENIPUNCTURE: CPT

## 2025-08-12 PROCEDURE — 84153 ASSAY OF PSA TOTAL: CPT

## 2025-08-15 ENCOUNTER — OFFICE VISIT (OUTPATIENT)
Dept: UROLOGY | Age: 80
End: 2025-08-15
Payer: MEDICARE

## 2025-08-15 VITALS — BODY MASS INDEX: 27.35 KG/M2 | HEIGHT: 70 IN | WEIGHT: 191 LBS

## 2025-08-15 DIAGNOSIS — N40.1 BENIGN PROSTATIC HYPERPLASIA WITH LOWER URINARY TRACT SYMPTOMS, SYMPTOM DETAILS UNSPECIFIED: Primary | ICD-10-CM

## 2025-08-15 DIAGNOSIS — N52.01 ERECTILE DYSFUNCTION DUE TO ARTERIAL INSUFFICIENCY: ICD-10-CM

## 2025-08-25 ENCOUNTER — OFFICE VISIT (OUTPATIENT)
Dept: PSYCHIATRY | Facility: CLINIC | Age: 80
End: 2025-08-25
Payer: MEDICARE

## 2025-08-25 VITALS
BODY MASS INDEX: 27.8 KG/M2 | WEIGHT: 194.2 LBS | OXYGEN SATURATION: 94 % | HEIGHT: 70 IN | DIASTOLIC BLOOD PRESSURE: 54 MMHG | SYSTOLIC BLOOD PRESSURE: 155 MMHG | HEART RATE: 53 BPM

## 2025-08-25 DIAGNOSIS — Z63.4 BEREAVEMENT: ICD-10-CM

## 2025-08-25 DIAGNOSIS — F51.05 INSOMNIA DUE TO MENTAL CONDITION: Primary | ICD-10-CM

## 2025-08-25 DIAGNOSIS — F33.1 MAJOR DEPRESSIVE DISORDER, RECURRENT EPISODE, MODERATE: ICD-10-CM

## 2025-08-25 DIAGNOSIS — F41.1 GENERALIZED ANXIETY DISORDER: ICD-10-CM

## 2025-08-25 PROCEDURE — 90833 PSYTX W PT W E/M 30 MIN: CPT | Performed by: STUDENT IN AN ORGANIZED HEALTH CARE EDUCATION/TRAINING PROGRAM

## 2025-08-25 PROCEDURE — 3077F SYST BP >= 140 MM HG: CPT | Performed by: STUDENT IN AN ORGANIZED HEALTH CARE EDUCATION/TRAINING PROGRAM

## 2025-08-25 PROCEDURE — 1159F MED LIST DOCD IN RCRD: CPT | Performed by: STUDENT IN AN ORGANIZED HEALTH CARE EDUCATION/TRAINING PROGRAM

## 2025-08-25 PROCEDURE — G2211 COMPLEX E/M VISIT ADD ON: HCPCS | Performed by: STUDENT IN AN ORGANIZED HEALTH CARE EDUCATION/TRAINING PROGRAM

## 2025-08-25 PROCEDURE — 99214 OFFICE O/P EST MOD 30 MIN: CPT | Performed by: STUDENT IN AN ORGANIZED HEALTH CARE EDUCATION/TRAINING PROGRAM

## 2025-08-25 PROCEDURE — 3078F DIAST BP <80 MM HG: CPT | Performed by: STUDENT IN AN ORGANIZED HEALTH CARE EDUCATION/TRAINING PROGRAM

## 2025-08-25 PROCEDURE — 1160F RVW MEDS BY RX/DR IN RCRD: CPT | Performed by: STUDENT IN AN ORGANIZED HEALTH CARE EDUCATION/TRAINING PROGRAM

## 2025-08-25 RX ORDER — TRAZODONE HYDROCHLORIDE 50 MG/1
100 TABLET ORAL NIGHTLY
Qty: 60 TABLET | Refills: 2 | Status: SHIPPED | OUTPATIENT
Start: 2025-08-25

## 2025-08-25 SDOH — SOCIAL STABILITY - SOCIAL INSECURITY: DISSAPEARANCE AND DEATH OF FAMILY MEMBER: Z63.4

## 2025-08-26 ENCOUNTER — TELEPHONE (OUTPATIENT)
Dept: CARDIOLOGY | Facility: CLINIC | Age: 80
End: 2025-08-26
Payer: MEDICARE

## 2025-08-27 DIAGNOSIS — F51.05 INSOMNIA DUE TO MENTAL CONDITION: ICD-10-CM

## 2025-08-27 DIAGNOSIS — I10 ESSENTIAL HYPERTENSION: Primary | ICD-10-CM

## 2025-08-27 RX ORDER — TRAZODONE HYDROCHLORIDE 50 MG/1
50 TABLET ORAL NIGHTLY
Qty: 30 TABLET | OUTPATIENT
Start: 2025-08-27

## 2025-08-28 RX ORDER — LOSARTAN POTASSIUM 25 MG/1
25 TABLET ORAL DAILY
Qty: 30 TABLET | Refills: 0 | Status: SHIPPED | OUTPATIENT
Start: 2025-08-28

## (undated) DEVICE — SOL IRRG H2O PL/BG 1000ML STRL

## (undated) DEVICE — Device

## (undated) DEVICE — SOL IRR NACL 0.9PCT BT 1000ML

## (undated) DEVICE — Device: Brand: DEFENDO AIR/WATER/SUCTION AND BIOPSY VALVE

## (undated) DEVICE — STERILE POLYISOPRENE POWDER-FREE SURGICAL GLOVES WITH EMOLLIENT COATING: Brand: PROTEXIS

## (undated) DEVICE — GOWN,REINFRCE,POLY,SIRUS,BREATH SLV,XXLG: Brand: MEDLINE

## (undated) DEVICE — LINER SURG CANSTR SXN S/RIGD 1500CC

## (undated) DEVICE — GLV SURG SENSICARE PI ORTHO SZ8 LF STRL

## (undated) DEVICE — CONN JET HYDRA H20 AUXILIARY DISP

## (undated) DEVICE — SOLIDIFIER LIQLOC PLS 1500CC BT